# Patient Record
Sex: FEMALE | Race: WHITE | Employment: FULL TIME | URBAN - METROPOLITAN AREA
[De-identification: names, ages, dates, MRNs, and addresses within clinical notes are randomized per-mention and may not be internally consistent; named-entity substitution may affect disease eponyms.]

---
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
## 2018-01-06 DIAGNOSIS — F33.9 RECURRENT DEPRESSION (HCC): ICD-10-CM

## 2018-01-07 RX ORDER — QUETIAPINE FUMARATE 50 MG/1
50 TABLET, FILM COATED ORAL NIGHTLY
Qty: 30 TABLET | Refills: 1 | Status: SHIPPED | OUTPATIENT
Start: 2018-01-07 | End: 2018-01-29 | Stop reason: SDUPTHER

## 2018-01-29 DIAGNOSIS — F33.9 RECURRENT DEPRESSION (HCC): ICD-10-CM

## 2018-01-29 RX ORDER — QUETIAPINE FUMARATE 50 MG/1
50 TABLET, FILM COATED ORAL NIGHTLY
Qty: 30 TABLET | Refills: 3 | Status: SHIPPED | OUTPATIENT
Start: 2018-01-29 | End: 2018-02-02 | Stop reason: SDUPTHER

## 2018-02-02 ENCOUNTER — OFFICE VISIT (OUTPATIENT)
Dept: PRIMARY CARE CLINIC | Age: 18
End: 2018-02-02
Payer: COMMERCIAL

## 2018-02-02 VITALS
DIASTOLIC BLOOD PRESSURE: 72 MMHG | HEART RATE: 76 BPM | BODY MASS INDEX: 29.47 KG/M2 | HEIGHT: 69 IN | RESPIRATION RATE: 14 BRPM | WEIGHT: 199 LBS | SYSTOLIC BLOOD PRESSURE: 110 MMHG | TEMPERATURE: 97.5 F

## 2018-02-02 DIAGNOSIS — R10.84 GENERALIZED ABDOMINAL PAIN: ICD-10-CM

## 2018-02-02 DIAGNOSIS — F42.9 OBSESSIVE-COMPULSIVE DISORDER, UNSPECIFIED TYPE: ICD-10-CM

## 2018-02-02 DIAGNOSIS — F33.9 RECURRENT DEPRESSION (HCC): Primary | ICD-10-CM

## 2018-02-02 DIAGNOSIS — R53.83 FATIGUE, UNSPECIFIED TYPE: ICD-10-CM

## 2018-02-02 DIAGNOSIS — G43.009 MIGRAINE WITHOUT AURA AND WITHOUT STATUS MIGRAINOSUS, NOT INTRACTABLE: ICD-10-CM

## 2018-02-02 PROCEDURE — G8427 DOCREV CUR MEDS BY ELIG CLIN: HCPCS | Performed by: FAMILY MEDICINE

## 2018-02-02 PROCEDURE — 99214 OFFICE O/P EST MOD 30 MIN: CPT | Performed by: FAMILY MEDICINE

## 2018-02-02 PROCEDURE — G8417 CALC BMI ABV UP PARAM F/U: HCPCS | Performed by: FAMILY MEDICINE

## 2018-02-02 PROCEDURE — G8484 FLU IMMUNIZE NO ADMIN: HCPCS | Performed by: FAMILY MEDICINE

## 2018-02-02 PROCEDURE — 1036F TOBACCO NON-USER: CPT | Performed by: FAMILY MEDICINE

## 2018-02-02 RX ORDER — QUETIAPINE FUMARATE 50 MG/1
50 TABLET, FILM COATED ORAL NIGHTLY
Qty: 30 TABLET | Refills: 1 | Status: SHIPPED | OUTPATIENT
Start: 2018-02-02 | End: 2018-04-17 | Stop reason: SDUPTHER

## 2018-02-02 ASSESSMENT — ENCOUNTER SYMPTOMS
CHEST TIGHTNESS: 0
ABDOMINAL PAIN: 1
SHORTNESS OF BREATH: 0
NAUSEA: 1
CHANGE IN BOWEL HABIT: 0

## 2018-02-02 NOTE — PROGRESS NOTES
Subjective:      Patient ID: Terry Hernández is a 21 y.o. female who presents today for:  Chief Complaint   Patient presents with    Depression     Pt currently taking Seraquel. Pt states lately she has been feeling tired with little energy.  Nausea     X years. Pt states she gets nauseous and vomits yellow stomach acid about 3 times a week. Pt states  when she does not vomit she just feels weird. Depression  Patient presents today for depression. Patient is currently taking Seroquel 50 mg. Condition is chronic and stable. Nausea & Vomiting   This is a chronic problem. The current episode started more than 1 year ago. The problem occurs every several days. The problem has been unchanged. Associated symptoms include abdominal pain, headaches and nausea. Pertinent negatives include no change in bowel habit, chest pain, chills or congestion. The symptoms are aggravated by stress. She has tried nothing for the symptoms. The treatment provided no relief. Fu abd pain    Chronic failed treatment    Past Medical History:   Diagnosis Date    Anxiety     Headache(784.0)      No past surgical history on file. Family History   Problem Relation Age of Onset    Cancer Mother     Arthritis Father     High Blood Pressure Father     Heart Disease Father     Heart Disease Maternal Grandmother     Diabetes Paternal Grandfather      Social History     Social History    Marital status: Single     Spouse name: N/A    Number of children: N/A    Years of education: N/A     Occupational History    Not on file. Social History Main Topics    Smoking status: Never Smoker    Smokeless tobacco: Never Used    Alcohol use No    Drug use: No    Sexual activity: Yes     Partners: Male     Other Topics Concern    Not on file     Social History Narrative    ** Merged History Encounter **          Allergies:  Review of patient's allergies indicates no known allergies.     Review of Systems   Constitutional:

## 2018-02-14 ENCOUNTER — TELEPHONE (OUTPATIENT)
Dept: PRIMARY CARE CLINIC | Age: 18
End: 2018-02-14

## 2018-02-14 NOTE — TELEPHONE ENCOUNTER
----- Message from Rhoda Crisostomo sent at 2/13/2018 10:01 AM EST -----  Hello    Pt was ordered CT abd pelvis w/cont by Dr. Debbie Gerber. It's in peer to peer due to no ultrasound. Tracking#[id number]. Pt was scheduled 2/12/18 and was postponed. [facility] [phone]. Thanks.

## 2018-02-15 DIAGNOSIS — R10.84 GENERALIZED ABDOMINAL PAIN: Primary | ICD-10-CM

## 2018-02-18 ENCOUNTER — APPOINTMENT (OUTPATIENT)
Dept: GENERAL RADIOLOGY | Age: 18
End: 2018-02-18
Payer: COMMERCIAL

## 2018-02-18 ENCOUNTER — HOSPITAL ENCOUNTER (EMERGENCY)
Age: 18
Discharge: HOME | End: 2018-02-18
Attending: EMERGENCY MEDICINE
Payer: COMMERCIAL

## 2018-02-18 ENCOUNTER — APPOINTMENT (OUTPATIENT)
Dept: CT IMAGING | Age: 18
End: 2018-02-18
Payer: COMMERCIAL

## 2018-02-18 VITALS
RESPIRATION RATE: 18 BRPM | DIASTOLIC BLOOD PRESSURE: 88 MMHG | SYSTOLIC BLOOD PRESSURE: 122 MMHG | BODY MASS INDEX: 28.79 KG/M2 | TEMPERATURE: 98.4 F | WEIGHT: 190 LBS | HEIGHT: 68 IN | OXYGEN SATURATION: 100 % | HEART RATE: 81 BPM

## 2018-02-18 DIAGNOSIS — R09.1 PLEURISY: Primary | ICD-10-CM

## 2018-02-18 LAB
ANION GAP SERPL CALCULATED.3IONS-SCNC: 13 MEQ/L (ref 7–13)
BASOPHILS ABSOLUTE: 0 K/UL (ref 0–0.2)
BASOPHILS RELATIVE PERCENT: 0.4 %
BUN BLDV-MCNC: 10 MG/DL (ref 6–20)
CALCIUM SERPL-MCNC: 9.5 MG/DL (ref 8.6–10.2)
CHLORIDE BLD-SCNC: 99 MEQ/L (ref 98–107)
CHP ED QC CHECK: YES
CO2: 24 MEQ/L (ref 22–29)
CREAT SERPL-MCNC: 0.5 MG/DL (ref 0.5–0.9)
EKG ATRIAL RATE: 79 BPM
EKG P AXIS: 64 DEGREES
EKG P-R INTERVAL: 160 MS
EKG Q-T INTERVAL: 376 MS
EKG QRS DURATION: 94 MS
EKG QTC CALCULATION (BAZETT): 431 MS
EKG R AXIS: 32 DEGREES
EKG T AXIS: 38 DEGREES
EKG VENTRICULAR RATE: 79 BPM
EOSINOPHILS ABSOLUTE: 0 K/UL (ref 0–0.7)
EOSINOPHILS RELATIVE PERCENT: 0.8 %
GFR AFRICAN AMERICAN: >60
GFR NON-AFRICAN AMERICAN: >60
GLUCOSE BLD-MCNC: 96 MG/DL (ref 74–109)
HCT VFR BLD CALC: 41.6 % (ref 37–47)
HEMOGLOBIN: 13.5 G/DL (ref 12–16)
LYMPHOCYTES ABSOLUTE: 2.1 K/UL (ref 1–4.8)
LYMPHOCYTES RELATIVE PERCENT: 36 %
MCH RBC QN AUTO: 26.7 PG (ref 27–31.3)
MCHC RBC AUTO-ENTMCNC: 32.5 % (ref 33–37)
MCV RBC AUTO: 82.2 FL (ref 82–100)
MONOCYTES ABSOLUTE: 0.6 K/UL (ref 0.2–0.8)
MONOCYTES RELATIVE PERCENT: 9.9 %
NEUTROPHILS ABSOLUTE: 3.1 K/UL (ref 1.4–6.5)
NEUTROPHILS RELATIVE PERCENT: 52.9 %
PDW BLD-RTO: 14.8 % (ref 11.5–14.5)
PLATELET # BLD: 271 K/UL (ref 130–400)
POC CREATININE WHOLE BLOOD: 0.5
POTASSIUM SERPL-SCNC: 4.1 MEQ/L (ref 3.5–5.1)
PREGNANCY TEST URINE, POC: NEGATIVE
RBC # BLD: 5.06 M/UL (ref 4.2–5.4)
SODIUM BLD-SCNC: 136 MEQ/L (ref 132–144)
TOTAL CK: 44 U/L (ref 0–170)
TROPONIN: <0.01 NG/ML (ref 0–0.01)
WBC # BLD: 5.9 K/UL (ref 4.5–11)

## 2018-02-18 PROCEDURE — 99285 EMERGENCY DEPT VISIT HI MDM: CPT

## 2018-02-18 PROCEDURE — 84484 ASSAY OF TROPONIN QUANT: CPT

## 2018-02-18 PROCEDURE — 85025 COMPLETE CBC W/AUTO DIFF WBC: CPT

## 2018-02-18 PROCEDURE — 80048 BASIC METABOLIC PNL TOTAL CA: CPT

## 2018-02-18 PROCEDURE — 71045 X-RAY EXAM CHEST 1 VIEW: CPT

## 2018-02-18 PROCEDURE — 71275 CT ANGIOGRAPHY CHEST: CPT

## 2018-02-18 PROCEDURE — 82550 ASSAY OF CK (CPK): CPT

## 2018-02-18 PROCEDURE — 36415 COLL VENOUS BLD VENIPUNCTURE: CPT

## 2018-02-18 PROCEDURE — 93005 ELECTROCARDIOGRAM TRACING: CPT

## 2018-02-18 PROCEDURE — 6360000002 HC RX W HCPCS: Performed by: EMERGENCY MEDICINE

## 2018-02-18 PROCEDURE — 96374 THER/PROPH/DIAG INJ IV PUSH: CPT

## 2018-02-18 PROCEDURE — 6360000004 HC RX CONTRAST MEDICATION: Performed by: EMERGENCY MEDICINE

## 2018-02-18 RX ORDER — METHYLPREDNISOLONE SODIUM SUCCINATE 125 MG/2ML
125 INJECTION, POWDER, LYOPHILIZED, FOR SOLUTION INTRAMUSCULAR; INTRAVENOUS ONCE
Status: COMPLETED | OUTPATIENT
Start: 2018-02-18 | End: 2018-02-18

## 2018-02-18 RX ORDER — PREDNISONE 10 MG/1
40 TABLET ORAL DAILY
Qty: 20 TABLET | Refills: 0 | Status: SHIPPED | OUTPATIENT
Start: 2018-02-18 | End: 2018-07-01

## 2018-02-18 RX ADMIN — METHYLPREDNISOLONE SODIUM SUCCINATE 125 MG: 125 INJECTION, POWDER, FOR SOLUTION INTRAMUSCULAR; INTRAVENOUS at 20:01

## 2018-02-18 RX ADMIN — IOPAMIDOL 100 ML: 755 INJECTION, SOLUTION INTRAVENOUS at 19:32

## 2018-02-18 ASSESSMENT — ENCOUNTER SYMPTOMS
WHEEZING: 0
RHINORRHEA: 0
EYES NEGATIVE: 1
SHORTNESS OF BREATH: 1
NAUSEA: 0
TROUBLE SWALLOWING: 0
VOMITING: 0
ABDOMINAL PAIN: 0
CHEST TIGHTNESS: 1
ALLERGIC/IMMUNOLOGIC NEGATIVE: 1

## 2018-02-18 ASSESSMENT — PATIENT HEALTH QUESTIONNAIRE - PHQ9: SUM OF ALL RESPONSES TO PHQ QUESTIONS 1-9: 13

## 2018-02-18 NOTE — ED PROVIDER NOTES
and negative. PAST MEDICAL HISTORY     Past Medical History:   Diagnosis Date    Anxiety     Depression     Headache(784.0)          SURGICAL HISTORY     History reviewed. No pertinent surgical history. CURRENT MEDICATIONS       Previous Medications    IBUPROFEN (ADVIL;MOTRIN) 400 MG TABLET    Take 1 tablet by mouth every 8 hours as needed for Pain    QUETIAPINE (SEROQUEL) 50 MG TABLET    Take 1 tablet by mouth nightly       ALLERGIES     Review of patient's allergies indicates no known allergies. FAMILY HISTORY       Family History   Problem Relation Age of Onset    Cancer Mother     Arthritis Father     High Blood Pressure Father     Heart Disease Father     Heart Disease Maternal Grandmother     Diabetes Paternal Grandfather           SOCIAL HISTORY       Social History     Social History    Marital status: Single     Spouse name: N/A    Number of children: N/A    Years of education: N/A     Social History Main Topics    Smoking status: Never Smoker    Smokeless tobacco: Never Used    Alcohol use No    Drug use: No    Sexual activity: Yes     Partners: Male     Other Topics Concern    None     Social History Narrative    ** Merged History Encounter **            SCREENINGS             PHYSICAL EXAM    (up to 7 for level 4, 8 or more for level 5)     ED Triage Vitals [02/18/18 1736]   BP Temp Temp Source Heart Rate Resp SpO2 Height Weight   123/82 98.4 °F (36.9 °C) Oral 80 14 99 % 5' 8\" (1.727 m) 190 lb (86.2 kg)       Physical Exam   Constitutional: She is oriented to person, place, and time. She appears well-developed and well-nourished. HENT:   Head: Normocephalic and atraumatic. Eyes: Conjunctivae and EOM are normal. Pupils are equal, round, and reactive to light. Neck: Trachea normal, normal range of motion and full passive range of motion without pain. Neck supple. Cardiovascular: Normal rate, regular rhythm, normal heart sounds, intact distal pulses and normal pulses.

## 2018-02-19 LAB
GFR AFRICAN AMERICAN: >60
GFR NON-AFRICAN AMERICAN: >60
PERFORMED ON: ABNORMAL
POC CREATININE: 0.5 MG/DL (ref 0.6–1.1)
POC SAMPLE TYPE: ABNORMAL

## 2018-02-19 NOTE — ED NOTES
Pt given discharge instructions, states understanding, pt ambulated to exit for discharge with steady gait     Yolis Mcclellan RN  02/18/18 2032

## 2018-04-17 DIAGNOSIS — F33.9 RECURRENT DEPRESSION (HCC): ICD-10-CM

## 2018-04-17 RX ORDER — QUETIAPINE FUMARATE 50 MG/1
50 TABLET, FILM COATED ORAL NIGHTLY
Qty: 30 TABLET | Refills: 1 | Status: SHIPPED | OUTPATIENT
Start: 2018-04-17 | End: 2018-05-23

## 2018-05-23 ENCOUNTER — OFFICE VISIT (OUTPATIENT)
Dept: PRIMARY CARE CLINIC | Age: 18
End: 2018-05-23
Payer: COMMERCIAL

## 2018-05-23 VITALS
DIASTOLIC BLOOD PRESSURE: 84 MMHG | BODY MASS INDEX: 29.4 KG/M2 | TEMPERATURE: 98.2 F | HEIGHT: 68 IN | SYSTOLIC BLOOD PRESSURE: 126 MMHG | OXYGEN SATURATION: 99 % | WEIGHT: 194 LBS | RESPIRATION RATE: 16 BRPM | HEART RATE: 98 BPM

## 2018-05-23 DIAGNOSIS — R10.821 RIGHT UPPER QUADRANT ABDOMINAL TENDERNESS WITH REBOUND TENDERNESS: ICD-10-CM

## 2018-05-23 DIAGNOSIS — R11.0 NAUSEA: ICD-10-CM

## 2018-05-23 DIAGNOSIS — R53.83 FATIGUE, UNSPECIFIED TYPE: ICD-10-CM

## 2018-05-23 DIAGNOSIS — E78.5 HYPERLIPIDEMIA, UNSPECIFIED HYPERLIPIDEMIA TYPE: ICD-10-CM

## 2018-05-23 DIAGNOSIS — R10.84 GENERALIZED ABDOMINAL PAIN: ICD-10-CM

## 2018-05-23 DIAGNOSIS — R19.7 DIARRHEA, UNSPECIFIED TYPE: ICD-10-CM

## 2018-05-23 DIAGNOSIS — E55.9 VITAMIN D DEFICIENCY: ICD-10-CM

## 2018-05-23 DIAGNOSIS — F33.9 RECURRENT DEPRESSION (HCC): ICD-10-CM

## 2018-05-23 DIAGNOSIS — N92.6 MISSED MENSES: Primary | ICD-10-CM

## 2018-05-23 LAB
BILIRUBIN, POC: NORMAL
BLOOD URINE, POC: NORMAL
CLARITY, POC: CLEAR
COLOR, POC: YELLOW
CONTROL: NORMAL
GLUCOSE URINE, POC: NORMAL
KETONES, POC: NORMAL
LEUKOCYTE EST, POC: NORMAL
NITRITE, POC: NORMAL
PH, POC: 6
PREGNANCY TEST URINE, POC: NORMAL
PROTEIN, POC: NORMAL
SPECIFIC GRAVITY, POC: 1.03
UROBILINOGEN, POC: NORMAL

## 2018-05-23 PROCEDURE — G8417 CALC BMI ABV UP PARAM F/U: HCPCS | Performed by: FAMILY MEDICINE

## 2018-05-23 PROCEDURE — 1036F TOBACCO NON-USER: CPT | Performed by: FAMILY MEDICINE

## 2018-05-23 PROCEDURE — 81025 URINE PREGNANCY TEST: CPT | Performed by: FAMILY MEDICINE

## 2018-05-23 PROCEDURE — G8427 DOCREV CUR MEDS BY ELIG CLIN: HCPCS | Performed by: FAMILY MEDICINE

## 2018-05-23 PROCEDURE — 81003 URINALYSIS AUTO W/O SCOPE: CPT | Performed by: FAMILY MEDICINE

## 2018-05-23 PROCEDURE — 99214 OFFICE O/P EST MOD 30 MIN: CPT | Performed by: FAMILY MEDICINE

## 2018-05-23 RX ORDER — QUETIAPINE FUMARATE 100 MG/1
100 TABLET, FILM COATED ORAL EVERY EVENING
Qty: 30 TABLET | Refills: 2 | Status: SHIPPED | OUTPATIENT
Start: 2018-05-23 | End: 2018-06-28

## 2018-05-23 RX ORDER — CIPROFLOXACIN 500 MG/1
500 TABLET, FILM COATED ORAL 2 TIMES DAILY
Qty: 20 TABLET | Refills: 0 | Status: SHIPPED | OUTPATIENT
Start: 2018-05-23 | End: 2018-06-28

## 2018-05-23 ASSESSMENT — ENCOUNTER SYMPTOMS
NAUSEA: 1
SHORTNESS OF BREATH: 0
WHEEZING: 0
COUGH: 0
ABDOMINAL PAIN: 1
DIARRHEA: 1
VOMITING: 1

## 2018-05-23 ASSESSMENT — PATIENT HEALTH QUESTIONNAIRE - PHQ9
1. LITTLE INTEREST OR PLEASURE IN DOING THINGS: 1
2. FEELING DOWN, DEPRESSED OR HOPELESS: 1
SUM OF ALL RESPONSES TO PHQ QUESTIONS 1-9: 2
SUM OF ALL RESPONSES TO PHQ9 QUESTIONS 1 & 2: 2

## 2018-05-24 DIAGNOSIS — R53.83 FATIGUE, UNSPECIFIED TYPE: ICD-10-CM

## 2018-05-24 DIAGNOSIS — E55.9 VITAMIN D DEFICIENCY: ICD-10-CM

## 2018-05-24 DIAGNOSIS — E78.5 HYPERLIPIDEMIA, UNSPECIFIED HYPERLIPIDEMIA TYPE: ICD-10-CM

## 2018-05-24 LAB
ALBUMIN SERPL-MCNC: 4.7 G/DL (ref 3.9–4.9)
ALP BLD-CCNC: 52 U/L (ref 40–130)
ALT SERPL-CCNC: 11 U/L (ref 0–33)
ANION GAP SERPL CALCULATED.3IONS-SCNC: 15 MEQ/L (ref 7–13)
AST SERPL-CCNC: 12 U/L (ref 0–35)
BASOPHILS ABSOLUTE: 0 K/UL (ref 0–0.2)
BASOPHILS RELATIVE PERCENT: 0.3 %
BILIRUB SERPL-MCNC: 0.5 MG/DL (ref 0–1.2)
BUN BLDV-MCNC: 11 MG/DL (ref 6–20)
CALCIUM SERPL-MCNC: 9.3 MG/DL (ref 8.6–10.2)
CHLORIDE BLD-SCNC: 100 MEQ/L (ref 98–107)
CHOLESTEROL, TOTAL: 172 MG/DL (ref 0–199)
CO2: 25 MEQ/L (ref 22–29)
CREAT SERPL-MCNC: 0.54 MG/DL (ref 0.5–0.9)
EOSINOPHILS ABSOLUTE: 0 K/UL (ref 0–0.7)
EOSINOPHILS RELATIVE PERCENT: 0.4 %
GFR AFRICAN AMERICAN: >60
GFR NON-AFRICAN AMERICAN: >60
GLOBULIN: 2.3 G/DL (ref 2.3–3.5)
GLUCOSE BLD-MCNC: 77 MG/DL (ref 74–109)
HCT VFR BLD CALC: 39.7 % (ref 37–47)
HDLC SERPL-MCNC: 53 MG/DL (ref 40–59)
HEMOGLOBIN: 13.2 G/DL (ref 12–16)
LDL CHOLESTEROL CALCULATED: 108 MG/DL (ref 0–129)
LYMPHOCYTES ABSOLUTE: 1.5 K/UL (ref 1–4.8)
LYMPHOCYTES RELATIVE PERCENT: 20.2 %
MCH RBC QN AUTO: 27.6 PG (ref 27–31.3)
MCHC RBC AUTO-ENTMCNC: 33.1 % (ref 33–37)
MCV RBC AUTO: 83.4 FL (ref 82–100)
MONOCYTES ABSOLUTE: 0.5 K/UL (ref 0.2–0.8)
MONOCYTES RELATIVE PERCENT: 6.4 %
NEUTROPHILS ABSOLUTE: 5.4 K/UL (ref 1.4–6.5)
NEUTROPHILS RELATIVE PERCENT: 72.7 %
PDW BLD-RTO: 14.7 % (ref 11.5–14.5)
PLATELET # BLD: 310 K/UL (ref 130–400)
POTASSIUM SERPL-SCNC: 4.3 MEQ/L (ref 3.5–5.1)
RBC # BLD: 4.76 M/UL (ref 4.2–5.4)
SODIUM BLD-SCNC: 140 MEQ/L (ref 132–144)
T4 TOTAL: 6.6 UG/DL (ref 4.5–11.7)
TOTAL PROTEIN: 7 G/DL (ref 6.4–8.1)
TRIGL SERPL-MCNC: 55 MG/DL (ref 0–200)
TSH SERPL DL<=0.05 MIU/L-ACNC: 2.46 UIU/ML (ref 0.27–4.2)
VITAMIN D 25-HYDROXY: 17.7 NG/ML (ref 30–100)
WBC # BLD: 7.4 K/UL (ref 4.5–11)

## 2018-05-26 LAB — T3 UPTAKE: 32 % (ref 28–41)

## 2018-06-08 ENCOUNTER — HOSPITAL ENCOUNTER (OUTPATIENT)
Dept: ULTRASOUND IMAGING | Age: 18
Discharge: HOME | End: 2018-06-10
Payer: COMMERCIAL

## 2018-06-08 DIAGNOSIS — R10.821 RIGHT UPPER QUADRANT ABDOMINAL TENDERNESS WITH REBOUND TENDERNESS: ICD-10-CM

## 2018-06-08 PROCEDURE — 76705 ECHO EXAM OF ABDOMEN: CPT

## 2018-06-15 ENCOUNTER — HOSPITAL ENCOUNTER (OUTPATIENT)
Dept: CT IMAGING | Age: 18
Discharge: HOME | End: 2018-06-17
Payer: COMMERCIAL

## 2018-06-15 VITALS
WEIGHT: 190 LBS | RESPIRATION RATE: 16 BRPM | HEIGHT: 67 IN | DIASTOLIC BLOOD PRESSURE: 85 MMHG | BODY MASS INDEX: 29.82 KG/M2 | HEART RATE: 97 BPM | SYSTOLIC BLOOD PRESSURE: 130 MMHG

## 2018-06-15 DIAGNOSIS — R11.0 NAUSEA: ICD-10-CM

## 2018-06-15 DIAGNOSIS — R10.821 RIGHT UPPER QUADRANT ABDOMINAL TENDERNESS WITH REBOUND TENDERNESS: ICD-10-CM

## 2018-06-15 DIAGNOSIS — R10.84 GENERALIZED ABDOMINAL PAIN: ICD-10-CM

## 2018-06-15 PROCEDURE — 6360000004 HC RX CONTRAST MEDICATION: Performed by: FAMILY MEDICINE

## 2018-06-15 PROCEDURE — 2580000003 HC RX 258: Performed by: FAMILY MEDICINE

## 2018-06-15 PROCEDURE — 2500000003 HC RX 250 WO HCPCS: Performed by: FAMILY MEDICINE

## 2018-06-15 PROCEDURE — 74177 CT ABD & PELVIS W/CONTRAST: CPT

## 2018-06-15 RX ORDER — SODIUM CHLORIDE 0.9 % (FLUSH) 0.9 %
10 SYRINGE (ML) INJECTION 2 TIMES DAILY
Status: DISCONTINUED | OUTPATIENT
Start: 2018-06-15 | End: 2018-06-18 | Stop reason: HOSPADM

## 2018-06-15 RX ADMIN — IOPAMIDOL 100 ML: 755 INJECTION, SOLUTION INTRAVENOUS at 10:45

## 2018-06-15 RX ADMIN — BARIUM SULFATE 450 ML: 20 SUSPENSION ORAL at 10:47

## 2018-06-15 RX ADMIN — Medication 10 ML: at 10:46

## 2018-06-28 ENCOUNTER — OFFICE VISIT (OUTPATIENT)
Dept: PRIMARY CARE CLINIC | Age: 18
End: 2018-06-28
Payer: COMMERCIAL

## 2018-06-28 VITALS
WEIGHT: 192.9 LBS | TEMPERATURE: 97.6 F | SYSTOLIC BLOOD PRESSURE: 124 MMHG | DIASTOLIC BLOOD PRESSURE: 80 MMHG | HEIGHT: 68 IN | HEART RATE: 96 BPM | OXYGEN SATURATION: 97 % | BODY MASS INDEX: 29.24 KG/M2

## 2018-06-28 DIAGNOSIS — R30.0 DYSURIA: ICD-10-CM

## 2018-06-28 DIAGNOSIS — N93.8 DUB (DYSFUNCTIONAL UTERINE BLEEDING): ICD-10-CM

## 2018-06-28 DIAGNOSIS — J32.9 SINUSITIS, UNSPECIFIED CHRONICITY, UNSPECIFIED LOCATION: ICD-10-CM

## 2018-06-28 DIAGNOSIS — R35.0 FREQUENCY OF URINATION: ICD-10-CM

## 2018-06-28 DIAGNOSIS — K58.2 IRRITABLE BOWEL SYNDROME WITH BOTH CONSTIPATION AND DIARRHEA: ICD-10-CM

## 2018-06-28 DIAGNOSIS — R10.84 GENERALIZED ABDOMINAL PAIN: ICD-10-CM

## 2018-06-28 DIAGNOSIS — E55.9 VITAMIN D DEFICIENCY: ICD-10-CM

## 2018-06-28 DIAGNOSIS — N76.0 BACTERIAL VAGINOSIS: ICD-10-CM

## 2018-06-28 DIAGNOSIS — B96.89 BACTERIAL VAGINOSIS: ICD-10-CM

## 2018-06-28 DIAGNOSIS — F33.9 RECURRENT DEPRESSION (HCC): Primary | ICD-10-CM

## 2018-06-28 LAB
ALBUMIN SERPL-MCNC: 4.8 G/DL (ref 3.9–4.9)
ALP BLD-CCNC: 52 U/L (ref 40–130)
ALT SERPL-CCNC: 11 U/L (ref 0–33)
ANION GAP SERPL CALCULATED.3IONS-SCNC: 17 MEQ/L (ref 7–13)
AST SERPL-CCNC: 12 U/L (ref 0–35)
BASOPHILS ABSOLUTE: 0 K/UL (ref 0–0.2)
BASOPHILS RELATIVE PERCENT: 0.3 %
BILIRUB SERPL-MCNC: 0.5 MG/DL (ref 0–1.2)
BILIRUBIN, POC: NORMAL
BLOOD URINE, POC: NORMAL
BUN BLDV-MCNC: 10 MG/DL (ref 6–20)
CALCIUM SERPL-MCNC: 9.6 MG/DL (ref 8.6–10.2)
CHLORIDE BLD-SCNC: 99 MEQ/L (ref 98–107)
CLARITY, POC: NORMAL
CO2: 23 MEQ/L (ref 22–29)
COLOR, POC: NORMAL
CONTROL: POSITIVE
CREAT SERPL-MCNC: 0.5 MG/DL (ref 0.5–0.9)
EOSINOPHILS ABSOLUTE: 0 K/UL (ref 0–0.7)
EOSINOPHILS RELATIVE PERCENT: 0.4 %
GFR AFRICAN AMERICAN: >60
GFR NON-AFRICAN AMERICAN: >60
GLOBULIN: 2.6 G/DL (ref 2.3–3.5)
GLUCOSE BLD-MCNC: 87 MG/DL (ref 74–109)
GLUCOSE URINE, POC: NORMAL
GONADOTROPIN, CHORIONIC (HCG) QUANT: 667.8 MIU/ML
HCT VFR BLD CALC: 39.3 % (ref 37–47)
HEMOGLOBIN: 13.3 G/DL (ref 12–16)
KETONES, POC: NORMAL
LEUKOCYTE EST, POC: 15
LYMPHOCYTES ABSOLUTE: 1.4 K/UL (ref 1–4.8)
LYMPHOCYTES RELATIVE PERCENT: 20.6 %
MCH RBC QN AUTO: 28 PG (ref 27–31.3)
MCHC RBC AUTO-ENTMCNC: 33.8 % (ref 33–37)
MCV RBC AUTO: 82.9 FL (ref 82–100)
MONOCYTES ABSOLUTE: 0.4 K/UL (ref 0.2–0.8)
MONOCYTES RELATIVE PERCENT: 6.5 %
NEUTROPHILS ABSOLUTE: 5 K/UL (ref 1.4–6.5)
NEUTROPHILS RELATIVE PERCENT: 72.2 %
NITRITE, POC: NORMAL
PDW BLD-RTO: 14.7 % (ref 11.5–14.5)
PH, POC: 7.5
PLATELET # BLD: 313 K/UL (ref 130–400)
POTASSIUM SERPL-SCNC: 4.1 MEQ/L (ref 3.5–5.1)
PREGNANCY TEST URINE, POC: POSITIVE
PROTEIN, POC: 0.15
RBC # BLD: 4.74 M/UL (ref 4.2–5.4)
SODIUM BLD-SCNC: 139 MEQ/L (ref 132–144)
SPECIFIC GRAVITY, POC: 1.01
TOTAL PROTEIN: 7.4 G/DL (ref 6.4–8.1)
UROBILINOGEN, POC: 3.5
WBC # BLD: 6.9 K/UL (ref 4.5–11)

## 2018-06-28 PROCEDURE — G8427 DOCREV CUR MEDS BY ELIG CLIN: HCPCS | Performed by: FAMILY MEDICINE

## 2018-06-28 PROCEDURE — 81003 URINALYSIS AUTO W/O SCOPE: CPT | Performed by: FAMILY MEDICINE

## 2018-06-28 PROCEDURE — 81025 URINE PREGNANCY TEST: CPT | Performed by: FAMILY MEDICINE

## 2018-06-28 PROCEDURE — G8417 CALC BMI ABV UP PARAM F/U: HCPCS | Performed by: FAMILY MEDICINE

## 2018-06-28 PROCEDURE — 99214 OFFICE O/P EST MOD 30 MIN: CPT | Performed by: FAMILY MEDICINE

## 2018-06-28 PROCEDURE — 1036F TOBACCO NON-USER: CPT | Performed by: FAMILY MEDICINE

## 2018-06-28 RX ORDER — FLUOXETINE HYDROCHLORIDE 20 MG/1
20 CAPSULE ORAL DAILY
Qty: 30 CAPSULE | Refills: 3 | Status: SHIPPED | OUTPATIENT
Start: 2018-06-28 | End: 2018-08-07 | Stop reason: ALTCHOICE

## 2018-06-28 RX ORDER — METRONIDAZOLE 7.5 MG/G
GEL VAGINAL
Qty: 1 TUBE | Refills: 2 | Status: SHIPPED | OUTPATIENT
Start: 2018-06-28 | End: 2018-06-28

## 2018-06-28 RX ORDER — AZITHROMYCIN 250 MG/1
TABLET, FILM COATED ORAL
Qty: 6 TABLET | Refills: 0 | Status: SHIPPED | OUTPATIENT
Start: 2018-06-28 | End: 2018-08-07 | Stop reason: ALTCHOICE

## 2018-06-28 RX ORDER — CHOLECALCIFEROL (VITAMIN D3) 50 MCG
2000 TABLET ORAL DAILY
Qty: 90 TABLET | Refills: 1 | Status: SHIPPED | OUTPATIENT
Start: 2018-06-28 | End: 2018-08-07

## 2018-06-28 ASSESSMENT — ENCOUNTER SYMPTOMS
SHORTNESS OF BREATH: 0
NAUSEA: 1
DIARRHEA: 1
ABDOMINAL PAIN: 1
BLOOD IN STOOL: 0
COUGH: 1
CONSTIPATION: 1
RECTAL PAIN: 0
VOMITING: 1

## 2018-06-28 NOTE — PROGRESS NOTES
Drug use: No    Sexual activity: Yes     Partners: Male     Other Topics Concern    Not on file     Social History Narrative    ** Merged History Encounter **          Allergies:  Patient has no known allergies. Review of Systems   Constitutional: Negative for chills, fever and weight loss. Respiratory: Positive for cough. Negative for shortness of breath. Cardiovascular: Negative for chest pain. Gastrointestinal: Positive for abdominal pain, constipation, diarrhea, nausea and vomiting. Negative for blood in stool, melena and rectal pain. Genitourinary: Positive for dysuria, vaginal discharge and vaginal pain. Musculoskeletal: Negative for myalgias. Neurological: Negative for dizziness and headaches. Objective:   /80 (Site: Right Arm, Position: Sitting, Cuff Size: Medium Adult)   Pulse 96   Temp 97.6 °F (36.4 °C) (Tympanic)   Ht 5' 8\" (1.727 m)   Wt 192 lb 14.4 oz (87.5 kg)   SpO2 97%   BMI 29.33 kg/m²     Physical Exam      PHYSICAL EXAMINATION:   VITAL SIGNS: are as recorded. GENERAL:  The patient appears well nourished and well-developed, and with normal affect. No acute respiratory distress. Alert and oriented times 3. No skin rashes. HEENT:  TMs normal bilaterally. Canals and ears normal. Pharynx is clear. Extraocular eye motions intact and pain free. Pupils reactive and equally round. Sclerae and conjunctivae clear, normocephalic and atraumatic. RESPIRATORY:  Clear and equal breath sounds with no acute respiratory distress. HEART: Regular rhythm without murmur, rub or gallop. ABDOMEN:  soft, nontender. No masses, guarding or rebound. Normoactive bowel sounds. NECK: No masses or adenopathy palpable. No bruits heard. No asymmetry visible. LOW BACK: No tenderness to palpation of inferior lumbar spine or either sacroiliac joint area. Assessment:      Diagnosis Orders   1. Recurrent depression ([address]     2.  Frequency of urination  POCT Urinalysis No Micro (Auto)   3. Generalized abdominal pain  POCT urine pregnancy   4. Sinusitis, unspecified chronicity, unspecified location  azithromycin (ZITHROMAX Z-EVI) 250 MG tablet   5. Irritable bowel syndrome with both constipation and diarrhea  CBC Auto Differential    Comprehensive Metabolic Panel    DISCONTINUED: linaclotide (LINZESS) 145 MCG capsule   6. Vitamin D deficiency  Cholecalciferol (VITAMIN D) 2000 units TABS tablet   7. Bacterial vaginosis  DISCONTINUED: metroNIDAZOLE (METROGEL VAGINAL) 0.75 % vaginal gel   8.  Dysuria  C.trachomatis N.gonorrhoeae DNA, Urine   9. DUB (dysfunctional uterine bleeding)  CBC Auto Differential    HCG, Quantitative, Pregnancy       Plan:      Orders Placed This Encounter   Procedures    C.trachomatis N.gonorrhoeae DNA, Urine     Standing Status:   Future     Number of Occurrences:   1     Standing Expiration Date:   6/28/2019    CBC Auto Differential     Standing Status:   Future     Number of Occurrences:   1     Standing Expiration Date:   6/28/2019    Comprehensive Metabolic Panel     Standing Status:   Future     Number of Occurrences:   1     Standing Expiration Date:   6/28/2019    HCG, Quantitative, Pregnancy     Standing Status:   Future     Number of Occurrences:   1     Standing Expiration Date:   6/28/2019    POCT Urinalysis No Micro (Auto)    POCT urine pregnancy     Orders Placed This Encounter   Medications    azithromycin (ZITHROMAX Z-EVI) 250 MG tablet     Sig: Take 2 pills today then one daily til finished     Dispense:  6 tablet     Refill:  0    Cholecalciferol (VITAMIN D) 2000 units TABS tablet     Sig: Take 1 tablet by mouth daily     Dispense:  90 tablet     Refill:  1    DISCONTD: linaclotide (LINZESS) 145 MCG capsule     Sig: Take 1 capsule by mouth every morning (before breakfast)     Dispense:  16 capsule     Refill:  0     [id number]  Exp-04/2019    DISCONTD: metroNIDAZOLE (METROGEL VAGINAL) 0.75 % vaginal gel     Sig: Place

## 2018-06-30 ENCOUNTER — OFFICE VISIT (OUTPATIENT)
Dept: PRIMARY CARE CLINIC | Age: 18
End: 2018-06-30
Payer: COMMERCIAL

## 2018-06-30 VITALS
DIASTOLIC BLOOD PRESSURE: 88 MMHG | WEIGHT: 192 LBS | OXYGEN SATURATION: 99 % | BODY MASS INDEX: 29.1 KG/M2 | RESPIRATION RATE: 16 BRPM | HEIGHT: 68 IN | TEMPERATURE: 97.4 F | HEART RATE: 74 BPM | SYSTOLIC BLOOD PRESSURE: 118 MMHG

## 2018-06-30 DIAGNOSIS — K58.9 IRRITABLE BOWEL SYNDROME, UNSPECIFIED TYPE: ICD-10-CM

## 2018-06-30 DIAGNOSIS — G43.009 MIGRAINE WITHOUT AURA AND WITHOUT STATUS MIGRAINOSUS, NOT INTRACTABLE: ICD-10-CM

## 2018-06-30 DIAGNOSIS — Z3A.01 LESS THAN 8 WEEKS GESTATION OF PREGNANCY: Primary | ICD-10-CM

## 2018-06-30 DIAGNOSIS — F33.9 RECURRENT DEPRESSION (HCC): ICD-10-CM

## 2018-06-30 PROCEDURE — G8427 DOCREV CUR MEDS BY ELIG CLIN: HCPCS | Performed by: FAMILY MEDICINE

## 2018-06-30 PROCEDURE — 1036F TOBACCO NON-USER: CPT | Performed by: FAMILY MEDICINE

## 2018-06-30 PROCEDURE — G8417 CALC BMI ABV UP PARAM F/U: HCPCS | Performed by: FAMILY MEDICINE

## 2018-06-30 PROCEDURE — 99214 OFFICE O/P EST MOD 30 MIN: CPT | Performed by: FAMILY MEDICINE

## 2018-06-30 RX ORDER — PNV NO.95/FERROUS FUM/FOLIC AC 28MG-0.8MG
TABLET ORAL
Qty: 90 TABLET | Refills: 3 | Status: SHIPPED | OUTPATIENT
Start: 2018-06-30 | End: 2018-07-02 | Stop reason: SDUPTHER

## 2018-06-30 NOTE — PROGRESS NOTES
Libido seems to be normal.  She reports no musculoskeletal aches or pains. No paresthesias or loss of strength. Vitals:    06/30/18 1122   BP: 118/88   Site: Left Arm   Position: Sitting   Cuff Size: Medium Adult   Pulse: 74   Resp: 16   Temp: 97.4 °F (36.3 °C)   TempSrc: Tympanic   SpO2: 99%   Weight: 192 lb (87.1 kg)   Height: 5' 8\" (1.727 m)       Physical Exam       PHYSICAL EXAMINATION:   VITAL SIGNS: are as recorded. GENERAL:  The patient appears well nourished and well-developed, and with normal affect. No acute respiratory distress. Alert and oriented times 3. No skin rashes. HEENT:  TMs normal bilaterally. Canals and ears normal. Pharynx is clear. Extraocular eye motions intact and pain free. Pupils reactive and equally round. Sclerae and conjunctivae clear, normocephalic and atraumatic. RESPIRATORY:  Clear and equal breath sounds with no acute respiratory distress. HEART: Regular rhythm without murmur, rub or gallop. ABDOMEN:  soft, nontender. No masses, guarding or rebound. Normoactive bowel sounds. LOW BACK: No tenderness to palpation of inferior lumbar spine or either sacroiliac joint area. Assessment/Plan  Wilmer Meredith was seen today for cough. Diagnoses and all orders for this visit:    Less than 8 weeks gestation of pregnancy  -     Ambulatory referral to Obstetrics / Gynecology    Recurrent depression ([facility] [address]    Migraine without aura and without status migrainosus, not intractable    Irritable bowel syndrome, unspecified type    Other orders  -     Discontinue: Prenatal Vit-Fe Fumarate-FA (PRENATAL VITAMIN) 27-0.8 MG TABS; qd        see ob soon    Health Maintenance Due   Topic Date Due    HIV screen  05/28/2013       Controlled Substances Monitoring:      Return in about 4 weeks (around 7/28/2018).     Lazaro Hernandez MD

## 2018-07-02 ENCOUNTER — TELEPHONE (OUTPATIENT)
Dept: PRIMARY CARE CLINIC | Age: 18
End: 2018-07-02

## 2018-07-02 ENCOUNTER — TELEPHONE (OUTPATIENT)
Dept: OBGYN CLINIC | Age: 18
End: 2018-07-02

## 2018-07-02 ENCOUNTER — PATIENT MESSAGE (OUTPATIENT)
Dept: PRIMARY CARE CLINIC | Age: 18
End: 2018-07-02

## 2018-07-02 DIAGNOSIS — N91.2 AMENORRHEA: Primary | ICD-10-CM

## 2018-07-02 RX ORDER — PNV NO.95/FERROUS FUM/FOLIC AC 28MG-0.8MG
TABLET ORAL
Qty: 90 TABLET | Refills: 3 | Status: SHIPPED | OUTPATIENT
Start: 2018-07-02 | End: 2018-09-25

## 2018-07-03 ENCOUNTER — TELEPHONE (OUTPATIENT)
Dept: PRIMARY CARE CLINIC | Age: 18
End: 2018-07-03

## 2018-07-03 LAB
C. TRACHOMATIS DNA ,URINE: NEGATIVE
N. GONORRHOEAE DNA, URINE: NEGATIVE

## 2018-07-03 NOTE — TELEPHONE ENCOUNTER
Ok to give pt letter. Please also give her the common mediation list as well ( list of rx she may take while pregnant) all us orders placed.

## 2018-07-03 NOTE — TELEPHONE ENCOUNTER
The prenatal vitamin you prescribed is not available any longer. She will need another one prescribed.

## 2018-07-12 ENCOUNTER — TELEPHONE (OUTPATIENT)
Dept: PRIMARY CARE CLINIC | Age: 18
End: 2018-07-12

## 2018-07-12 RX ORDER — AMOXICILLIN 500 MG/1
TABLET, FILM COATED ORAL
Qty: 30 TABLET | Refills: 0 | Status: SHIPPED | OUTPATIENT
Start: 2018-07-12 | End: 2018-08-07 | Stop reason: ALTCHOICE

## 2018-07-13 ENCOUNTER — HOSPITAL ENCOUNTER (EMERGENCY)
Age: 18
Discharge: HOME | End: 2018-07-13
Payer: COMMERCIAL

## 2018-07-13 VITALS
RESPIRATION RATE: 18 BRPM | WEIGHT: 200 LBS | HEIGHT: 68 IN | OXYGEN SATURATION: 99 % | BODY MASS INDEX: 30.31 KG/M2 | SYSTOLIC BLOOD PRESSURE: 126 MMHG | DIASTOLIC BLOOD PRESSURE: 78 MMHG | HEART RATE: 91 BPM | TEMPERATURE: 98.1 F
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
## 2018-07-13 DIAGNOSIS — S39.012A STRAIN OF LUMBAR REGION, INITIAL ENCOUNTER: ICD-10-CM

## 2018-07-13 PROCEDURE — 99283 EMERGENCY DEPT VISIT LOW MDM: CPT

## 2018-07-13 PROCEDURE — 6370000000 HC RX 637 (ALT 250 FOR IP): Performed by: NURSE PRACTITIONER

## 2018-07-13 RX ORDER — ACETAMINOPHEN 325 MG/1
650 TABLET ORAL ONCE
Status: COMPLETED | OUTPATIENT
Start: 2018-07-13 | End: 2018-07-13

## 2018-07-13 RX ADMIN — ACETAMINOPHEN 650 MG: 325 TABLET ORAL at 13:27

## 2018-07-13 ASSESSMENT — PAIN DESCRIPTION - ORIENTATION: ORIENTATION: LOWER

## 2018-07-13 ASSESSMENT — ENCOUNTER SYMPTOMS
ABDOMINAL PAIN: 0
SHORTNESS OF BREATH: 0
COUGH: 0
BACK PAIN: 1

## 2018-07-13 ASSESSMENT — PAIN DESCRIPTION - LOCATION: LOCATION: BACK

## 2018-07-13 ASSESSMENT — PAIN SCALES - GENERAL
PAINLEVEL_OUTOF10: 4
PAINLEVEL_OUTOF10: 4
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
## 2018-07-13 NOTE — ED PROVIDER NOTES
Father     High Blood Pressure Father     Heart Disease Father     Heart Disease Maternal Grandmother     Diabetes Paternal Grandfather           SOCIAL HISTORY       Social History     Social History    Marital status: Single     Spouse name: N/A    Number of children: N/A    Years of education: N/A     Social History Main Topics    Smoking status: Never Smoker    Smokeless tobacco: Never Used    Alcohol use No    Drug use: No    Sexual activity: Yes     Partners: Male     Other Topics Concern    None     Social History Narrative    ** Merged History Encounter **            SCREENINGS             PHYSICAL EXAM    (up to 7 for level 4, 8 or more for level 5)     ED Triage Vitals   BP Temp Temp src Pulse Resp SpO2 Height Weight   -- -- -- -- -- -- -- --       Physical Exam   Constitutional: She is oriented to person, place, and time. She appears well-developed and well-nourished. HENT:   Head: Normocephalic and atraumatic. Right Ear: External ear normal.   Left Ear: External ear normal.   Mouth/Throat: Oropharynx is clear and moist.   Eyes: Conjunctivae and EOM are normal. Pupils are equal, round, and reactive to light. Neck: Normal range of motion. Neck supple. Cardiovascular: Normal rate and regular rhythm. Pulmonary/Chest: Effort normal and breath sounds normal.   Abdominal: Soft. Bowel sounds are normal. She exhibits no distension. There is no tenderness. Musculoskeletal: Normal range of motion. Lumbar back: She exhibits tenderness and pain. She exhibits normal range of motion, no bony tenderness, no swelling, no edema, no deformity, no laceration, no spasm and normal pulse. Back:    Neurological: She is alert and oriented to person, place, and time. She has normal reflexes. Skin: Skin is warm and dry. Psychiatric: Judgment normal.   Nursing note and vitals reviewed. All other labs were within normal range or not returned as of this dictation.     EMERGENCY

## 2018-07-18 ENCOUNTER — HOSPITAL ENCOUNTER (OUTPATIENT)
Dept: ULTRASOUND IMAGING | Age: 18
Discharge: HOME | End: 2018-07-20
Payer: COMMERCIAL

## 2018-07-18 DIAGNOSIS — N91.2 AMENORRHEA: ICD-10-CM

## 2018-07-23 ENCOUNTER — HOSPITAL ENCOUNTER (OUTPATIENT)
Dept: ULTRASOUND IMAGING | Age: 18
Discharge: HOME | End: 2018-07-25
Payer: COMMERCIAL

## 2018-07-23 PROCEDURE — 76801 OB US < 14 WKS SINGLE FETUS: CPT

## 2018-07-23 PROCEDURE — 76830 TRANSVAGINAL US NON-OB: CPT

## 2018-08-07 ENCOUNTER — OFFICE VISIT (OUTPATIENT)
Dept: OBGYN CLINIC | Age: 18
End: 2018-08-07
Payer: COMMERCIAL

## 2018-08-07 VITALS
HEIGHT: 68 IN | WEIGHT: 191 LBS | DIASTOLIC BLOOD PRESSURE: 80 MMHG | BODY MASS INDEX: 28.95 KG/M2 | SYSTOLIC BLOOD PRESSURE: 122 MMHG

## 2018-08-07 DIAGNOSIS — N91.2 AMENORRHEA: Primary | ICD-10-CM

## 2018-08-07 DIAGNOSIS — N91.2 AMENORRHEA: ICD-10-CM

## 2018-08-07 DIAGNOSIS — Z3A.10 10 WEEKS GESTATION OF PREGNANCY: ICD-10-CM

## 2018-08-07 LAB
ABO/RH: NORMAL
ANTIBODY SCREEN: NORMAL
BACTERIA: NORMAL /HPF
BASOPHILS ABSOLUTE: 0 K/UL (ref 0–0.2)
BASOPHILS RELATIVE PERCENT: 0.4 %
BILIRUBIN URINE: ABNORMAL
BLOOD, URINE: NEGATIVE
CLARITY: ABNORMAL
COLOR: ABNORMAL
CRYSTALS, UA: NORMAL
EOSINOPHILS ABSOLUTE: 0.1 K/UL (ref 0–0.7)
EOSINOPHILS RELATIVE PERCENT: 1 %
EPITHELIAL CELLS, UA: NORMAL /HPF
GLUCOSE URINE: NEGATIVE MG/DL
HCT VFR BLD CALC: 35.7 % (ref 37–47)
HEMOGLOBIN: 12.3 G/DL (ref 12–16)
HEPATITIS B SURFACE ANTIGEN INTERPRETATION: NORMAL
KETONES, URINE: ABNORMAL MG/DL
LEUKOCYTE ESTERASE, URINE: ABNORMAL
LYMPHOCYTES ABSOLUTE: 1.1 K/UL (ref 1–4.8)
LYMPHOCYTES RELATIVE PERCENT: 20.1 %
MCH RBC QN AUTO: 28.6 PG (ref 27–31.3)
MCHC RBC AUTO-ENTMCNC: 34.6 % (ref 33–37)
MCV RBC AUTO: 82.7 FL (ref 82–100)
MONOCYTES ABSOLUTE: 0.8 K/UL (ref 0.2–0.8)
MONOCYTES RELATIVE PERCENT: 13.8 %
NEUTROPHILS ABSOLUTE: 3.5 K/UL (ref 1.4–6.5)
NEUTROPHILS RELATIVE PERCENT: 64.7 %
NITRITE, URINE: NEGATIVE
PDW BLD-RTO: 14.1 % (ref 11.5–14.5)
PH UA: 5.5 (ref 5–9)
PLATELET # BLD: 259 K/UL (ref 130–400)
PROTEIN UA: ABNORMAL MG/DL
RBC # BLD: 4.31 M/UL (ref 4.2–5.4)
RBC UA: NORMAL /HPF (ref 0–2)
RUBELLA ANTIBODY IGG: 38.4 IU/ML
SPECIFIC GRAVITY UA: 1.03 (ref 1–1.03)
UROBILINOGEN, URINE: 1 E.U./DL
WBC # BLD: 5.5 K/UL (ref 4.5–11)
WBC UA: NORMAL /HPF (ref 0–5)

## 2018-08-07 PROCEDURE — G8417 CALC BMI ABV UP PARAM F/U: HCPCS | Performed by: OBSTETRICS & GYNECOLOGY

## 2018-08-07 PROCEDURE — G8427 DOCREV CUR MEDS BY ELIG CLIN: HCPCS | Performed by: OBSTETRICS & GYNECOLOGY

## 2018-08-07 PROCEDURE — 1036F TOBACCO NON-USER: CPT | Performed by: OBSTETRICS & GYNECOLOGY

## 2018-08-07 PROCEDURE — 99204 OFFICE O/P NEW MOD 45 MIN: CPT | Performed by: OBSTETRICS & GYNECOLOGY

## 2018-08-07 RX ORDER — PNV NO.95/FERROUS FUM/FOLIC AC 28MG-0.8MG
1 TABLET ORAL DAILY
Qty: 30 TABLET | Refills: 11 | Status: SHIPPED | OUTPATIENT
Start: 2018-08-07 | End: 2018-09-25

## 2018-08-07 RX ORDER — ONDANSETRON 8 MG/1
8 TABLET, ORALLY DISINTEGRATING ORAL EVERY 8 HOURS PRN
Qty: 30 TABLET | Refills: 1 | Status: SHIPPED | OUTPATIENT
Start: 2018-08-07 | End: 2018-09-25

## 2018-08-07 RX ORDER — QUETIAPINE FUMARATE 100 MG/1
TABLET, FILM COATED ORAL
Refills: 0 | COMMUNITY
Start: 2018-07-29 | End: 2018-08-07 | Stop reason: ALTCHOICE

## 2018-08-07 ASSESSMENT — ENCOUNTER SYMPTOMS
SHORTNESS OF BREATH: 0
ABDOMINAL PAIN: 0
BLOOD IN STOOL: 0
CONSTIPATION: 0
WHEEZING: 0
DIARRHEA: 0
COUGH: 0

## 2018-08-07 NOTE — PROGRESS NOTES
Former User    Alcohol use No    Drug use: No    Sexual activity: Yes     Partners: Male     Other Topics Concern    Not on file     Social History Narrative    ** Merged History Encounter **              MEDICATIONS:  Current Outpatient Prescriptions on File Prior to Visit   Medication Sig Dispense Refill    Prenatal Vit-Fe Fumarate-FA (PRENATAL VITAMIN) 27-0.8 MG TABS qd 90 tablet 3     No current facility-administered medications on file prior to visit. ALLERGIES:  Allergies as of 08/07/2018    (No Known Allergies)           Gynecologic History:     Patient's last menstrual period was 05/26/2018.      ________________________________________________________________________  REVIEW OF SYSTEMS:       Review of Systems   Respiratory: Negative for cough, shortness of breath and wheezing. Cardiovascular: Negative for chest pain, palpitations and leg swelling. Gastrointestinal: Negative for abdominal pain, blood in stool, constipation and diarrhea. Genitourinary: Negative for difficulty urinating, dysuria, flank pain and hematuria. Skin: Negative. Psychiatric/Behavioral: Negative. PHYSICAL Exam:    Constitutional:  Vitals:    08/07/18 1500   BP: 122/80   Weight: 191 lb (86.6 kg)   Height: 5' 8\" (1.727 m)       Physical Exam   Constitutional: She is oriented to person, place, and time. She appears well-developed and well-nourished. HENT:   Head: Normocephalic and atraumatic. Cardiovascular: Normal rate and regular rhythm. Abdominal: Soft. Normal appearance. There is no tenderness. Musculoskeletal: Normal range of motion. Neurological: She is alert and oriented to person, place, and time. Skin: Skin is warm and intact. No lesion noted. No erythema. Psychiatric: She has a normal mood and affect.  Her behavior is normal. Judgment and thought content normal.         ASSESSMENT:      19 y.o. Annual   Diagnosis Orders   1. Amenorrhea  Alpha Fetoprotein, Maternal    Carrier Screen, 4 Conditions ([place])    CBC Auto Differential    Hepatitis B Surface Antigen    HIV-1,-2 w/Reflex to HIV-1 Western Blot    Pain Management Drug Screen    Prenatal Testing for Fetal Aneuploidy    RPR Reflex to Titer and TPPA    Rubella antibody, IgG    Type and screen    Urinalysis    Urine Culture    Varicella Zoster Antibody, IgG    US OB 14 Plus Weeks Single or First Gestation   2. 10 weeks gestation of pregnancy                                    Counseling Completed:          PLAN:      No orders of the defined types were placed in this encounter. No orders of the defined types were placed in this encounter. No Follow-up on file. Pt was seen with total face to face time of 45 minutes with more than 50% of the visit being counseling and education regarding the encounter dx of   1. Amenorrhea    2. 10 weeks gestation of pregnancy    . I, Dr Socrates Weber, personally performed the services described in this documentation, as scribed by Juan Pablo Manning in my presence, and it is both accurate and complete.  Electronically signed by: Socrates Weber MD

## 2018-08-07 NOTE — LETTER
JONATHAN MOYER [facility]  [facility] [address]  Phone: [phone]  Fax: [phone]      August 7, 2018    Heraclio Rdz  [address]  [address] [place] [address]      To Whom It May Concern: This letter is to confirm that Heraclio Rdz is an obstetrical patient followed in our practice. Heraclio Rdz is currently pregnant with one baby and her expected due date is 3/2/19. If you have any questions or concerns, please don't hesitate to call.     Sincerely,    Elissa Atkins MD

## 2018-08-09 LAB
RPR: NORMAL
URINE CULTURE, ROUTINE: NORMAL

## 2018-08-10 LAB
6-ACETYLMORPHINE: NOT DETECTED
7-AMINOCLONAZEPAM: NOT DETECTED
ALPHA-OH-ALPRAZOLAM: NOT DETECTED
ALPRAZOLAM: NOT DETECTED
AMPHETAMINE: NOT DETECTED
BARBITURATES: NOT DETECTED
BENZOYLECGONINE: NOT DETECTED
BUPRENORPHINE: NOT DETECTED
CARISOPRODOL: NOT DETECTED
CLONAZEPAM: NOT DETECTED
CODEINE: NOT DETECTED
CREATININE URINE: 286.2 MG/DL (ref 20–400)
DIAZEPAM: NOT DETECTED
EER PAIN MGT DRUG PANEL, HIGH RES/EMIT U: NORMAL
ETHYL GLUCURONIDE: NOT DETECTED
FENTANYL: NOT DETECTED
HIV-1 AND HIV-2 ANTIBODIES: NEGATIVE
HYDROCODONE: NOT DETECTED
HYDROMORPHONE: NOT DETECTED
LORAZEPAM: NOT DETECTED
MARIJUANA METABOLITE: NOT DETECTED
MDA: NOT DETECTED
MDEA: NOT DETECTED
MDMA URINE: NOT DETECTED
MEPERIDINE: NOT DETECTED
METHADONE: NOT DETECTED
METHAMPHETAMINE: NOT DETECTED
METHYLPHENIDATE: NOT DETECTED
MIDAZOLAM: NOT DETECTED
MORPHINE: NOT DETECTED
NORBUPRENORPHINE, FREE: NOT DETECTED
NORDIAZEPAM: NOT DETECTED
NORFENTANYL: NOT DETECTED
NORHYDROCODONE, URINE: NOT DETECTED
NOROXYCODONE: NOT DETECTED
NOROXYMORPHONE, URINE: NOT DETECTED
OXAZEPAM: NOT DETECTED
OXYCODONE: NOT DETECTED
OXYMORPHONE: NOT DETECTED
PAIN MANAGEMENT DRUG PANEL: NORMAL
PCP: NOT DETECTED
PHENTERMINE: NOT DETECTED
PROPOXYPHENE: NOT DETECTED
TAPENTADOL, URINE: NOT DETECTED
TAPENTADOL-O-SULFATE, URINE: NOT DETECTED
TEMAZEPAM: NOT DETECTED
TRAMADOL: NOT DETECTED
VZV IGG SER QL IA: 1.07
ZOLPIDEM: NOT DETECTED

## 2018-08-14 LAB
EER NON INVASIVE PRENATAL ANEUPLOIDY: NORMAL
FETAL FRACTION: 7.3
FETAL GENDER: NORMAL
GESTATIONAL AGE (DAYS): 3
GESTATIONAL AGE(WEEKS): 10
Lab: NORMAL
MATERNAL WEIGHT: 191
MONOSOMY X: NORMAL
REPORT FETUS GENDER: YES
TRIPLOIDY (VANISHING TWIN): NORMAL
TRISOMY 13 RISK: NORMAL
TRISOMY 18 RISK ASSESSMENT: NORMAL
TRISOMY 21 RISK: NORMAL

## 2018-08-28 LAB
CARRIER SCREEN, 4 CONDITIONS: NORMAL
EER CARRIER SCREEN, 4 CONDITIONS: NORMAL

## 2018-09-04 ENCOUNTER — APPOINTMENT (OUTPATIENT)
Dept: ULTRASOUND IMAGING | Age: 18
End: 2018-09-04
Payer: COMMERCIAL

## 2018-09-04 ENCOUNTER — HOSPITAL ENCOUNTER (EMERGENCY)
Age: 18
Discharge: HOME | End: 2018-09-04
Payer: COMMERCIAL

## 2018-09-04 VITALS
HEIGHT: 68 IN | BODY MASS INDEX: 29.25 KG/M2 | RESPIRATION RATE: 18 BRPM | HEART RATE: 86 BPM | TEMPERATURE: 98 F | OXYGEN SATURATION: 98 % | DIASTOLIC BLOOD PRESSURE: 77 MMHG | SYSTOLIC BLOOD PRESSURE: 122 MMHG | WEIGHT: 193 LBS

## 2018-09-04 DIAGNOSIS — O46.90 VAGINAL BLEEDING DURING PREGNANCY: Primary | ICD-10-CM

## 2018-09-04 LAB
ABO/RH: NORMAL
ALBUMIN SERPL-MCNC: 4 G/DL (ref 3.9–4.9)
ALP BLD-CCNC: 38 U/L (ref 40–130)
ALT SERPL-CCNC: 8 U/L (ref 0–33)
ANION GAP SERPL CALCULATED.3IONS-SCNC: 13 MEQ/L (ref 7–13)
ANTIBODY SCREEN: NORMAL
AST SERPL-CCNC: 13 U/L (ref 0–35)
BACTERIA: NORMAL /HPF
BASOPHILS ABSOLUTE: 0.1 K/UL (ref 0–0.2)
BASOPHILS RELATIVE PERCENT: 0.5 %
BILIRUB SERPL-MCNC: 0.3 MG/DL (ref 0–1.2)
BILIRUBIN URINE: ABNORMAL
BLOOD, URINE: NEGATIVE
BUN BLDV-MCNC: 7 MG/DL (ref 6–20)
CALCIUM SERPL-MCNC: 9.2 MG/DL (ref 8.6–10.2)
CHLORIDE BLD-SCNC: 103 MEQ/L (ref 98–107)
CHP ED QC CHECK: NORMAL
CLARITY: CLEAR
CO2: 21 MEQ/L (ref 22–29)
COLOR: ABNORMAL
CREAT SERPL-MCNC: 0.39 MG/DL (ref 0.5–0.9)
CRYSTALS, UA: NORMAL
EOSINOPHILS ABSOLUTE: 0 K/UL (ref 0–0.7)
EOSINOPHILS RELATIVE PERCENT: 0.1 %
EPITHELIAL CELLS, UA: NORMAL /HPF
GFR AFRICAN AMERICAN: >60
GFR NON-AFRICAN AMERICAN: >60
GLOBULIN: 2.9 G/DL (ref 2.3–3.5)
GLUCOSE BLD-MCNC: 82 MG/DL (ref 74–109)
GLUCOSE URINE: NEGATIVE MG/DL
GONADOTROPIN, CHORIONIC (HCG) QUANT: NORMAL MIU/ML
HCT VFR BLD CALC: 35.3 % (ref 37–47)
HEMOGLOBIN: 12.4 G/DL (ref 12–16)
KETONES, URINE: 40 MG/DL
LEUKOCYTE ESTERASE, URINE: NEGATIVE
LIPASE: 24 U/L (ref 13–60)
LYMPHOCYTES ABSOLUTE: 1.6 K/UL (ref 1–4.8)
LYMPHOCYTES RELATIVE PERCENT: 15.3 %
MCH RBC QN AUTO: 28.6 PG (ref 27–31.3)
MCHC RBC AUTO-ENTMCNC: 35 % (ref 33–37)
MCV RBC AUTO: 81.7 FL (ref 82–100)
MONOCYTES ABSOLUTE: 0.6 K/UL (ref 0.2–0.8)
MONOCYTES RELATIVE PERCENT: 5.9 %
MUCUS: PRESENT
NEUTROPHILS ABSOLUTE: 7.9 K/UL (ref 1.4–6.5)
NEUTROPHILS RELATIVE PERCENT: 78.2 %
NITRITE, URINE: NEGATIVE
PDW BLD-RTO: 14.1 % (ref 11.5–14.5)
PH UA: 6 (ref 5–9)
PLATELET # BLD: 257 K/UL (ref 130–400)
POTASSIUM SERPL-SCNC: 4 MEQ/L (ref 3.5–5.1)
PREGNANCY TEST URINE, POC: POSITIVE
PROTEIN UA: 100 MG/DL
RBC # BLD: 4.32 M/UL (ref 4.2–5.4)
RBC UA: NORMAL /HPF (ref 0–2)
SODIUM BLD-SCNC: 137 MEQ/L (ref 132–144)
SPECIFIC GRAVITY UA: 1.02 (ref 1–1.03)
TOTAL PROTEIN: 6.9 G/DL (ref 6.4–8.1)
URINE REFLEX TO CULTURE: YES
UROBILINOGEN, URINE: 1 E.U./DL
WBC # BLD: 10.2 K/UL (ref 4.5–11)
WBC UA: NORMAL /HPF (ref 0–5)

## 2018-09-04 PROCEDURE — 36415 COLL VENOUS BLD VENIPUNCTURE: CPT

## 2018-09-04 PROCEDURE — 81001 URINALYSIS AUTO W/SCOPE: CPT

## 2018-09-04 PROCEDURE — 85025 COMPLETE CBC W/AUTO DIFF WBC: CPT

## 2018-09-04 PROCEDURE — 86900 BLOOD TYPING SEROLOGIC ABO: CPT

## 2018-09-04 PROCEDURE — 84702 CHORIONIC GONADOTROPIN TEST: CPT

## 2018-09-04 PROCEDURE — 2580000003 HC RX 258: Performed by: PHYSICIAN ASSISTANT

## 2018-09-04 PROCEDURE — 86901 BLOOD TYPING SEROLOGIC RH(D): CPT

## 2018-09-04 PROCEDURE — 86850 RBC ANTIBODY SCREEN: CPT

## 2018-09-04 PROCEDURE — 99284 EMERGENCY DEPT VISIT MOD MDM: CPT

## 2018-09-04 PROCEDURE — 80053 COMPREHEN METABOLIC PANEL: CPT

## 2018-09-04 PROCEDURE — 83690 ASSAY OF LIPASE: CPT

## 2018-09-04 PROCEDURE — 87086 URINE CULTURE/COLONY COUNT: CPT

## 2018-09-04 PROCEDURE — 76805 OB US >/= 14 WKS SNGL FETUS: CPT

## 2018-09-04 RX ORDER — 0.9 % SODIUM CHLORIDE 0.9 %
1000 INTRAVENOUS SOLUTION INTRAVENOUS ONCE
Status: COMPLETED | OUTPATIENT
Start: 2018-09-04 | End: 2018-09-04

## 2018-09-04 RX ADMIN — SODIUM CHLORIDE 1000 ML: 9 INJECTION, SOLUTION INTRAVENOUS at 15:03

## 2018-09-04 ASSESSMENT — PAIN SCALES - GENERAL: PAINLEVEL_OUTOF10: 1

## 2018-09-04 ASSESSMENT — ENCOUNTER SYMPTOMS
BACK PAIN: 0
DIARRHEA: 0
VOMITING: 0
SORE THROAT: 0
PHOTOPHOBIA: 0
ABDOMINAL PAIN: 0
SHORTNESS OF BREATH: 0
EYE PAIN: 0
COUGH: 0
NAUSEA: 0
RHINORRHEA: 0

## 2018-09-04 ASSESSMENT — PAIN DESCRIPTION - LOCATION: LOCATION: ABDOMEN

## 2018-09-04 ASSESSMENT — PAIN DESCRIPTION - DESCRIPTORS: DESCRIPTORS: PRESSURE

## 2018-09-04 ASSESSMENT — PAIN DESCRIPTION - PAIN TYPE: TYPE: ACUTE PAIN

## 2018-09-04 NOTE — ED NOTES
Pt resfusing oconnor for ultrasound. Clau PALACIO notified. Ultrasound tech called & per ultrasound tech it is protocol that pt is to have oconnor for US through ER & without a oconnor they are unable to do the ultrasound-Hope PA aware.      Elvi Alexandra RN  09/04/18 1027 hospitals Iris Ferrara RN  09/04/18 8249

## 2018-09-04 NOTE — ED NOTES
Ultrasound tech advised she will not take patient for test due to the fact she only drank a small amount of water. Clau advised.       Neida Mcmanus RN  09/04/18 0602

## 2018-09-04 NOTE — ED NOTES
Advised patient ultrasound was coming to pick patient up. Asked patient if she drank her water and she said most of it.        Neida Mcmanus RN  09/04/18 9758

## 2018-09-04 NOTE — ED PROVIDER NOTES
[facility] ED  eMERGENCY dEPARTMENT eNCOUnter      Pt Name: Garett Lehman  MRN: [id number]  [place] 2000  Date of evaluation: 9/4/2018  Provider: Garcia Alva PA-C      HISTORY OF PRESENT ILLNESS    Garett Lehman is a 19 y.o. female who presents to the Emergency Department with Vaginal bleeding and cramping. Patient states that she is 14 weeks pregnant. Patient denies any fevers chills. She not take any medication. Patient states she woke up this morning with a dark red bleeding. REVIEW OF SYSTEMS       Review of Systems   Constitutional: Negative for chills, diaphoresis, fatigue and fever. HENT: Negative for congestion, rhinorrhea and sore throat. Eyes: Negative for photophobia and pain. Respiratory: Negative for cough and shortness of breath. Cardiovascular: Negative for chest pain and palpitations. Gastrointestinal: Negative for abdominal pain, diarrhea, nausea and vomiting. Genitourinary: Positive for pelvic pain and vaginal bleeding. Negative for dysuria and flank pain. Musculoskeletal: Negative for back pain. Skin: Negative for rash. Neurological: Negative for dizziness, light-headedness and headaches. All other systems reviewed and are negative. PAST MEDICAL HISTORY     Past Medical History:   Diagnosis Date    Anxiety     Depression     Headache(784.0)          SURGICAL HISTORY     No past surgical history on file.       CURRENT MEDICATIONS       Discharge Medication List as of 9/4/2018  6:33 PM      CONTINUE these medications which have NOT CHANGED    Details   ondansetron (ZOFRAN ODT) 8 MG disintegrating tablet Take 1 tablet by mouth every 8 hours as needed for Nausea or Vomiting, Disp-30 tablet, R-1Normal      !! Prenatal Vit-Fe Fumarate-FA (PRENATAL VITAMIN) 27-0.8 MG TABS Take 1 tablet by mouth daily, Disp-30 tablet, R-11Normal      !! Prenatal Vit-Fe Fumarate-FA (PRENATAL VITAMIN) 27-0.8 MG TABS qd, Disp-90 tablet, R-3Normal       !! -

## 2018-09-05 LAB — URINE CULTURE, ROUTINE: NORMAL

## 2018-09-11 ENCOUNTER — INITIAL PRENATAL (OUTPATIENT)
Dept: OBGYN CLINIC | Age: 18
End: 2018-09-11
Payer: COMMERCIAL

## 2018-09-11 VITALS
BODY MASS INDEX: 28.89 KG/M2 | HEART RATE: 84 BPM | DIASTOLIC BLOOD PRESSURE: 80 MMHG | SYSTOLIC BLOOD PRESSURE: 116 MMHG | WEIGHT: 190 LBS

## 2018-09-11 DIAGNOSIS — Z11.51 SCREENING FOR HPV (HUMAN PAPILLOMAVIRUS): ICD-10-CM

## 2018-09-11 DIAGNOSIS — Z11.3 ROUTINE SCREENING FOR STI (SEXUALLY TRANSMITTED INFECTION): ICD-10-CM

## 2018-09-11 DIAGNOSIS — Z3A.15 15 WEEKS GESTATION OF PREGNANCY: ICD-10-CM

## 2018-09-11 DIAGNOSIS — Z23 NEED FOR INFLUENZA VACCINATION: ICD-10-CM

## 2018-09-11 DIAGNOSIS — Z34.02 PREGNANCY, SUPERVISION OF FIRST, SECOND TRIMESTER: ICD-10-CM

## 2018-09-11 DIAGNOSIS — Z34.02 PREGNANCY, SUPERVISION OF FIRST, SECOND TRIMESTER: Primary | ICD-10-CM

## 2018-09-11 PROCEDURE — 90686 IIV4 VACC NO PRSV 0.5 ML IM: CPT | Performed by: OBSTETRICS & GYNECOLOGY

## 2018-09-11 PROCEDURE — 90471 IMMUNIZATION ADMIN: CPT | Performed by: OBSTETRICS & GYNECOLOGY

## 2018-09-11 RX ORDER — PNV NO.95/FERROUS FUM/FOLIC AC 28MG-0.8MG
1 TABLET ORAL DAILY
Qty: 30 TABLET | Refills: 11 | Status: SHIPPED | OUTPATIENT
Start: 2018-09-11 | End: 2019-09-13 | Stop reason: ALTCHOICE

## 2018-09-12 LAB
CLUE CELLS: NORMAL
TRICHOMONAS PREP: NORMAL
TRICHOMONAS VAGINALIS SCREEN: NEGATIVE
YEAST WET PREP: NORMAL

## 2018-09-14 LAB
C TRACH DNA GENITAL QL NAA+PROBE: NEGATIVE
N. GONORRHOEAE DNA: NEGATIVE

## 2018-09-18 LAB
HPV COMMENT: ABNORMAL
HPV TYPE 16: NOT DETECTED
HPV TYPE 18: NOT DETECTED
HPVOH (OTHER TYPES): DETECTED

## 2018-09-25 ENCOUNTER — OFFICE VISIT (OUTPATIENT)
Dept: PRIMARY CARE CLINIC | Age: 18
End: 2018-09-25
Payer: COMMERCIAL

## 2018-09-25 VITALS
SYSTOLIC BLOOD PRESSURE: 124 MMHG | WEIGHT: 191 LBS | HEIGHT: 68 IN | BODY MASS INDEX: 28.95 KG/M2 | DIASTOLIC BLOOD PRESSURE: 76 MMHG

## 2018-09-25 DIAGNOSIS — B07.9 VIRAL WARTS, UNSPECIFIED TYPE: ICD-10-CM

## 2018-09-25 DIAGNOSIS — Z3A.17 17 WEEKS GESTATION OF PREGNANCY: ICD-10-CM

## 2018-09-25 DIAGNOSIS — F42.9 OBSESSIVE-COMPULSIVE DISORDER, UNSPECIFIED TYPE: ICD-10-CM

## 2018-09-25 DIAGNOSIS — R20.8 BURNING SENSATION: ICD-10-CM

## 2018-09-25 DIAGNOSIS — F33.9 RECURRENT DEPRESSION (HCC): Primary | ICD-10-CM

## 2018-09-25 DIAGNOSIS — G43.009 MIGRAINE WITHOUT AURA AND WITHOUT STATUS MIGRAINOSUS, NOT INTRACTABLE: ICD-10-CM

## 2018-09-25 DIAGNOSIS — R11.2 NAUSEA AND VOMITING, INTRACTABILITY OF VOMITING NOT SPECIFIED, UNSPECIFIED VOMITING TYPE: ICD-10-CM

## 2018-09-25 PROCEDURE — G8427 DOCREV CUR MEDS BY ELIG CLIN: HCPCS | Performed by: FAMILY MEDICINE

## 2018-09-25 PROCEDURE — 99213 OFFICE O/P EST LOW 20 MIN: CPT | Performed by: FAMILY MEDICINE

## 2018-09-25 PROCEDURE — G8417 CALC BMI ABV UP PARAM F/U: HCPCS | Performed by: FAMILY MEDICINE

## 2018-09-25 PROCEDURE — 17003 DESTRUCT PREMALG LES 2-14: CPT | Performed by: FAMILY MEDICINE

## 2018-09-25 PROCEDURE — 17000 DESTRUCT PREMALG LESION: CPT | Performed by: FAMILY MEDICINE

## 2018-09-25 PROCEDURE — 1036F TOBACCO NON-USER: CPT | Performed by: FAMILY MEDICINE

## 2018-09-25 ASSESSMENT — ENCOUNTER SYMPTOMS
NAUSEA: 1
VOMITING: 1
DIARRHEA: 0
CHOKING: 0
EYE REDNESS: 0
APNEA: 0
STRIDOR: 0
SHORTNESS OF BREATH: 0
CHEST TIGHTNESS: 0
WHEEZING: 0
PHOTOPHOBIA: 0
FACIAL SWELLING: 0
CONSTIPATION: 0
EYE PAIN: 0
COLOR CHANGE: 0
EYE DISCHARGE: 0
ABDOMINAL PAIN: 0

## 2018-09-25 NOTE — PROGRESS NOTES
Subjective:      Patient ID: Augustina Jacobsen is a 19 y.o. female who presents today for:  Chief Complaint   Patient presents with    Verruca Vulgaris     patient c/o warts on bilateral hands that she would like to have removed.  Nausea     patient c/o daily nausea and vomiting due to being 17 weeks pregnant. HPI   Verruca Vulgaris  Patient presents today for warts on bilateral hands that she would like removed today. Patient has c/o of nausea and vomiting due to being 17 weeks pregnant. Patient was seeing Dr. Mike Wilder. Fu depression, migraines,ocd  Chronic, stable    Past Medical History:   Diagnosis Date    Anxiety     Depression     Headache(784.0)      No past surgical history on file. Family History   Problem Relation Age of Onset    Cancer Mother     Arthritis Father     High Blood Pressure Father     Heart Disease Father     Heart Disease Maternal Grandmother     Diabetes Paternal Grandfather      Social History     Social History    Marital status: Single     Spouse name: N/A    Number of children: N/A    Years of education: N/A     Occupational History    Not on file. Social History Main Topics    Smoking status: Never Smoker    Smokeless tobacco: Former User    Alcohol use No    Drug use: No    Sexual activity: Yes     Partners: Male     Other Topics Concern    Not on file     Social History Narrative    ** Merged History Encounter **          Allergies:  Patient has no known allergies. Review of Systems   Constitutional: Negative for activity change, appetite change, chills, diaphoresis and fever. HENT: Negative for congestion, ear discharge, ear pain, facial swelling, hearing loss and mouth sores. Eyes: Negative for photophobia, pain, discharge and redness. Respiratory: Negative for apnea, choking, chest tightness, shortness of breath, wheezing and stridor. Cardiovascular: Negative for chest pain, palpitations and leg swelling.    Gastrointestinal: Positive for nausea and vomiting. Negative for abdominal pain, constipation and diarrhea. Endocrine: Negative for cold intolerance, heat intolerance, polydipsia and polyphagia. Genitourinary: Negative for dysuria and frequency. Musculoskeletal: Negative for gait problem, joint swelling, neck pain and neck stiffness. Skin: Negative for color change, pallor, rash and wound. Allergic/Immunologic: Negative for immunocompromised state. Neurological: Negative for dizziness, tremors, syncope, facial asymmetry, speech difficulty, light-headedness and headaches. Psychiatric/Behavioral: Negative for confusion and hallucinations. The patient is not nervous/anxious and is not hyperactive. Objective:   /76   Ht 5' 8\" (1.727 m)   Wt 191 lb (86.6 kg)   LMP 05/26/2018   BMI 29.04 kg/m²     Physical Exam      PHYSICAL EXAMINATION:   VITAL SIGNS: are as recorded. GENERAL:  The patient appears well nourished and well-developed, and with normal affect. No acute respiratory distress. Alert and oriented times 3. No skin rashes. HEENT:  TMs normal bilaterally. Canals and ears normal. Pharynx is clear. Extraocular eye motions intact and pain free. Pupils reactive and equally round. Sclerae and conjunctivae clear, normocephalic and atraumatic. RESPIRATORY:  Clear and equal breath sounds with no acute respiratory distress. HEART: Regular rhythm without murmur, rub or gallop. ABDOMEN:  soft, nontender. No masses, guarding or rebound. Normoactive bowel sounds. LOW BACK: No tenderness to palpation of inferior lumbar spine or either sacroiliac joint area. Warts x 5 hands    Assessment:      Diagnosis Orders   1. Recurrent depression ([address]     2. Nausea and vomiting, intractability of vomiting not specified, unspecified vomiting type     3.  Migraine without aura and without status migrainosus, not intractable     4. 17 weeks gestation of pregnancy  [facility] Faraz Campuzano MD   5. Burning sensation  [address] - [place], FIRST LESION    57891 - [place],2-14 LESIONS   6. Viral warts, unspecified type  88796 - [place], FIRST LESION    [address]24 - [place],2-14 LESIONS   7. Obsessive-compulsive disorder, unspecified type         Plan:      Orders Placed This Encounter   Procedures   [address], MD     Referral Priority:   Routine     Referral Type:   Eval and Treat     Referral Reason:   Specialty Services Required     Referred to Provider:   Lawson Oseguera MD     Requested Specialty:   Obstetrics & Gynecology     Number of Visits Requested:   1    [address] - [place], FIRST LESION    [address]4464 -  [facility]     No orders of the defined types were placed in this encounter. Risks were explained. Consent was obtained. Liquid nitrogen was used to treat lesions. Pt tolerated procedure well. There were no complications. Warts x 5 hands    Controlled Substances Monitoring:      Return in about 3 weeks (around 10/16/2018). Mark Olszewski, RMA  , am scribing for and in the presence of Karl Stevenson MD. Electronically signed by :  Jamir Tripathi MD, personally performed the services described in this documentation, as scribed by SURI Palacios   in my presence, and it is both accurate and complete.  Electronically signed by: Karl Stevenson MD    9/25/18 4:01 PM    Karl Stevenson MD

## 2018-10-06 ENCOUNTER — HOSPITAL ENCOUNTER (OUTPATIENT)
Dept: ULTRASOUND IMAGING | Age: 18
Discharge: HOME | End: 2018-10-08
Payer: COMMERCIAL

## 2018-10-06 DIAGNOSIS — N91.2 AMENORRHEA: ICD-10-CM

## 2018-10-06 PROCEDURE — 76805 OB US >/= 14 WKS SNGL FETUS: CPT

## 2018-10-09 ENCOUNTER — ROUTINE PRENATAL (OUTPATIENT)
Dept: OBGYN CLINIC | Age: 18
End: 2018-10-09

## 2018-10-09 VITALS
DIASTOLIC BLOOD PRESSURE: 70 MMHG | HEART RATE: 82 BPM | WEIGHT: 189 LBS | BODY MASS INDEX: 28.74 KG/M2 | SYSTOLIC BLOOD PRESSURE: 132 MMHG

## 2018-10-09 DIAGNOSIS — Z3A.19 19 WEEKS GESTATION OF PREGNANCY: ICD-10-CM

## 2018-10-09 DIAGNOSIS — K21.9 GASTROESOPHAGEAL REFLUX DISEASE WITHOUT ESOPHAGITIS: ICD-10-CM

## 2018-10-09 DIAGNOSIS — R10.11 RUQ PAIN: ICD-10-CM

## 2018-10-09 DIAGNOSIS — Z34.02 ENCOUNTER FOR SUPERVISION OF NORMAL FIRST PREGNANCY IN SECOND TRIMESTER: Primary | ICD-10-CM

## 2018-10-09 PROCEDURE — 0502F SUBSEQUENT PRENATAL CARE: CPT | Performed by: OBSTETRICS & GYNECOLOGY

## 2018-10-10 ENCOUNTER — TELEPHONE (OUTPATIENT)
Dept: OBGYN CLINIC | Age: 18
End: 2018-10-10

## 2018-10-11 RX ORDER — OMEPRAZOLE 10 MG/1
10 CAPSULE, DELAYED RELEASE ORAL DAILY
Qty: 30 CAPSULE | Refills: 3 | Status: SHIPPED | OUTPATIENT
Start: 2018-10-11 | End: 2019-01-03 | Stop reason: SDUPTHER

## 2018-10-28 ENCOUNTER — HOSPITAL ENCOUNTER (OUTPATIENT)
Dept: ULTRASOUND IMAGING | Age: 18
Discharge: HOME | End: 2018-10-30
Payer: COMMERCIAL

## 2018-10-28 DIAGNOSIS — R10.11 RUQ PAIN: ICD-10-CM

## 2018-10-28 DIAGNOSIS — Z34.02 ENCOUNTER FOR SUPERVISION OF NORMAL FIRST PREGNANCY IN SECOND TRIMESTER: ICD-10-CM

## 2018-10-28 DIAGNOSIS — K21.9 GASTROESOPHAGEAL REFLUX DISEASE WITHOUT ESOPHAGITIS: ICD-10-CM

## 2018-10-28 DIAGNOSIS — Z3A.19 19 WEEKS GESTATION OF PREGNANCY: ICD-10-CM

## 2018-10-28 PROCEDURE — 76805 OB US >/= 14 WKS SNGL FETUS: CPT

## 2018-10-28 PROCEDURE — 76705 ECHO EXAM OF ABDOMEN: CPT

## 2018-11-09 ENCOUNTER — ROUTINE PRENATAL (OUTPATIENT)
Dept: OBGYN CLINIC | Age: 18
End: 2018-11-09
Payer: COMMERCIAL

## 2018-11-09 VITALS
SYSTOLIC BLOOD PRESSURE: 104 MMHG | WEIGHT: 195 LBS | DIASTOLIC BLOOD PRESSURE: 70 MMHG | HEART RATE: 86 BPM | BODY MASS INDEX: 29.65 KG/M2

## 2018-11-09 DIAGNOSIS — Z34.00 ENCOUNTER FOR SUPERVISION PREGNANCY IN PRIMIGRAVIDA, ANTEPARTUM: Primary | ICD-10-CM

## 2018-11-09 DIAGNOSIS — Z3A.23 23 WEEKS GESTATION OF PREGNANCY: ICD-10-CM

## 2018-11-09 PROCEDURE — G8427 DOCREV CUR MEDS BY ELIG CLIN: HCPCS | Performed by: OBSTETRICS & GYNECOLOGY

## 2018-11-09 PROCEDURE — 1036F TOBACCO NON-USER: CPT | Performed by: OBSTETRICS & GYNECOLOGY

## 2018-11-09 PROCEDURE — G8482 FLU IMMUNIZE ORDER/ADMIN: HCPCS | Performed by: OBSTETRICS & GYNECOLOGY

## 2018-11-09 PROCEDURE — G8417 CALC BMI ABV UP PARAM F/U: HCPCS | Performed by: OBSTETRICS & GYNECOLOGY

## 2018-11-09 PROCEDURE — 99213 OFFICE O/P EST LOW 20 MIN: CPT | Performed by: OBSTETRICS & GYNECOLOGY

## 2018-12-04 DIAGNOSIS — Z3A.27 27 WEEKS GESTATION OF PREGNANCY: ICD-10-CM

## 2018-12-04 LAB
GLUCOSE, 1HR PP: 103 MG/DL (ref 60–140)
HCT VFR BLD CALC: 36.3 % (ref 37–47)
HEMOGLOBIN: 12.3 G/DL (ref 12–16)

## 2019-01-02 ENCOUNTER — HOSPITAL ENCOUNTER (OUTPATIENT)
Age: 19
Discharge: HOME | End: 2019-01-02
Attending: OBSTETRICS & GYNECOLOGY | Admitting: OBSTETRICS & GYNECOLOGY
Payer: COMMERCIAL

## 2019-01-02 VITALS
DIASTOLIC BLOOD PRESSURE: 80 MMHG | TEMPERATURE: 98.4 F | HEART RATE: 75 BPM | SYSTOLIC BLOOD PRESSURE: 125 MMHG | RESPIRATION RATE: 16 BRPM

## 2019-01-02 LAB
ALBUMIN SERPL-MCNC: 3.6 G/DL (ref 3.9–4.9)
ALP BLD-CCNC: 85 U/L (ref 40–130)
ALT SERPL-CCNC: 7 U/L (ref 0–33)
AMPHETAMINE SCREEN, URINE: NORMAL
ANION GAP SERPL CALCULATED.3IONS-SCNC: 13 MEQ/L (ref 7–13)
AST SERPL-CCNC: 11 U/L (ref 0–35)
BACTERIA: NEGATIVE /HPF
BARBITURATE SCREEN URINE: NORMAL
BASOPHILS ABSOLUTE: 0 K/UL (ref 0–0.2)
BASOPHILS RELATIVE PERCENT: 0.4 %
BENZODIAZEPINE SCREEN, URINE: NORMAL
BILIRUB SERPL-MCNC: <0.2 MG/DL (ref 0–1.2)
BILIRUBIN URINE: NEGATIVE
BLOOD, URINE: NEGATIVE
BUN BLDV-MCNC: 12 MG/DL (ref 6–20)
CALCIUM SERPL-MCNC: 9.5 MG/DL (ref 8.6–10.2)
CANNABINOID SCREEN URINE: NORMAL
CHLORIDE BLD-SCNC: 99 MEQ/L (ref 98–107)
CLARITY: CLEAR
CO2: 24 MEQ/L (ref 22–29)
COCAINE METABOLITE SCREEN URINE: NORMAL
COLOR: YELLOW
CREAT SERPL-MCNC: 0.46 MG/DL (ref 0.5–0.9)
EOSINOPHILS ABSOLUTE: 0.1 K/UL (ref 0–0.7)
EOSINOPHILS RELATIVE PERCENT: 0.8 %
EPITHELIAL CELLS, UA: ABNORMAL /HPF (ref 0–5)
GFR AFRICAN AMERICAN: >60
GFR NON-AFRICAN AMERICAN: >60
GLOBULIN: 3.4 G/DL (ref 2.3–3.5)
GLUCOSE BLD-MCNC: 124 MG/DL (ref 74–109)
GLUCOSE URINE: NEGATIVE MG/DL
HCT VFR BLD CALC: 35.9 % (ref 37–47)
HEMOGLOBIN: 11.8 G/DL (ref 12–16)
HYALINE CASTS: ABNORMAL /HPF (ref 0–5)
KETONES, URINE: NEGATIVE MG/DL
LEUKOCYTE ESTERASE, URINE: ABNORMAL
LYMPHOCYTES ABSOLUTE: 1.3 K/UL (ref 1–4.8)
LYMPHOCYTES RELATIVE PERCENT: 16.4 %
Lab: NORMAL
MCH RBC QN AUTO: 26.4 PG (ref 27–31.3)
MCHC RBC AUTO-ENTMCNC: 32.8 % (ref 33–37)
MCV RBC AUTO: 80.4 FL (ref 82–100)
MONOCYTES ABSOLUTE: 0.4 K/UL (ref 0.2–0.8)
MONOCYTES RELATIVE PERCENT: 5.7 %
NEUTROPHILS ABSOLUTE: 6 K/UL (ref 1.4–6.5)
NEUTROPHILS RELATIVE PERCENT: 76.7 %
NITRITE, URINE: NEGATIVE
OPIATE SCREEN URINE: NORMAL
PDW BLD-RTO: 13.7 % (ref 11.5–14.5)
PH UA: 6.5 (ref 5–9)
PHENCYCLIDINE SCREEN URINE: NORMAL
PLATELET # BLD: 299 K/UL (ref 130–400)
POTASSIUM SERPL-SCNC: 3.9 MEQ/L (ref 3.5–5.1)
PROTEIN UA: NEGATIVE MG/DL
RBC # BLD: 4.47 M/UL (ref 4.2–5.4)
RBC UA: ABNORMAL /HPF (ref 0–5)
SODIUM BLD-SCNC: 136 MEQ/L (ref 132–144)
SPECIFIC GRAVITY UA: 1.01 (ref 1–1.03)
TOTAL PROTEIN: 7 G/DL (ref 6.4–8.1)
URIC ACID, SERUM: 2.8 MG/DL (ref 2.4–5.7)
UROBILINOGEN, URINE: 0.2 E.U./DL
WBC # BLD: 7.8 K/UL (ref 4.5–11)
WBC UA: ABNORMAL /HPF (ref 0–5)

## 2019-01-02 PROCEDURE — 36415 COLL VENOUS BLD VENIPUNCTURE: CPT

## 2019-01-02 PROCEDURE — 84550 ASSAY OF BLOOD/URIC ACID: CPT

## 2019-01-02 PROCEDURE — 59025 FETAL NON-STRESS TEST: CPT

## 2019-01-02 PROCEDURE — 80307 DRUG TEST PRSMV CHEM ANLYZR: CPT

## 2019-01-02 PROCEDURE — 85025 COMPLETE CBC W/AUTO DIFF WBC: CPT

## 2019-01-02 PROCEDURE — 80053 COMPREHEN METABOLIC PANEL: CPT

## 2019-01-02 PROCEDURE — 99283 EMERGENCY DEPT VISIT LOW MDM: CPT

## 2019-01-02 PROCEDURE — 81001 URINALYSIS AUTO W/SCOPE: CPT

## 2019-01-04 RX ORDER — OMEPRAZOLE 10 MG/1
10 CAPSULE, DELAYED RELEASE ORAL DAILY
Qty: 30 CAPSULE | Refills: 3 | Status: ON HOLD | OUTPATIENT
Start: 2019-01-04 | End: 2019-02-08

## 2019-01-11 NOTE — ED NOTES
Pt states she is going to attempt to drink more water per hope.       Channing Spatz, RN  09/04/18 4360 yes

## 2019-01-22 ENCOUNTER — APPOINTMENT (OUTPATIENT)
Dept: ULTRASOUND IMAGING | Age: 19
End: 2019-01-22
Payer: COMMERCIAL

## 2019-01-22 ENCOUNTER — HOSPITAL ENCOUNTER (OUTPATIENT)
Age: 19
Discharge: HOME | End: 2019-01-22
Attending: OBSTETRICS & GYNECOLOGY | Admitting: OBSTETRICS & GYNECOLOGY
Payer: COMMERCIAL

## 2019-01-22 VITALS
WEIGHT: 219 LBS | TEMPERATURE: 97.8 F | HEIGHT: 68 IN | SYSTOLIC BLOOD PRESSURE: 144 MMHG | HEART RATE: 77 BPM | DIASTOLIC BLOOD PRESSURE: 86 MMHG | BODY MASS INDEX: 33.19 KG/M2 | RESPIRATION RATE: 16 BRPM

## 2019-01-22 LAB
ALBUMIN SERPL-MCNC: 3.4 G/DL (ref 3.9–4.9)
ALP BLD-CCNC: 99 U/L (ref 40–130)
ALT SERPL-CCNC: 22 U/L (ref 0–33)
AMPHETAMINE SCREEN, URINE: NORMAL
ANION GAP SERPL CALCULATED.3IONS-SCNC: 12 MEQ/L (ref 7–13)
AST SERPL-CCNC: 12 U/L (ref 0–35)
BACTERIA: ABNORMAL /HPF
BARBITURATE SCREEN URINE: NORMAL
BASOPHILS ABSOLUTE: 0 K/UL (ref 0–0.2)
BASOPHILS RELATIVE PERCENT: 0.3 %
BENZODIAZEPINE SCREEN, URINE: NORMAL
BILIRUB SERPL-MCNC: <0.2 MG/DL (ref 0–1.2)
BILIRUBIN URINE: NEGATIVE
BLOOD, URINE: NEGATIVE
BUN BLDV-MCNC: 9 MG/DL (ref 6–20)
CALCIUM SERPL-MCNC: 8.6 MG/DL (ref 8.6–10.2)
CANNABINOID SCREEN URINE: NORMAL
CHLORIDE BLD-SCNC: 99 MEQ/L (ref 98–107)
CLARITY: CLEAR
CO2: 21 MEQ/L (ref 22–29)
COCAINE METABOLITE SCREEN URINE: NORMAL
COLOR: YELLOW
CREAT SERPL-MCNC: 0.4 MG/DL (ref 0.5–0.9)
EOSINOPHILS ABSOLUTE: 0 K/UL (ref 0–0.7)
EOSINOPHILS RELATIVE PERCENT: 0.4 %
EPITHELIAL CELLS, UA: ABNORMAL /HPF (ref 0–5)
GFR AFRICAN AMERICAN: >60
GFR NON-AFRICAN AMERICAN: >60
GLOBULIN: 3.6 G/DL (ref 2.3–3.5)
GLUCOSE BLD-MCNC: 71 MG/DL (ref 74–109)
GLUCOSE URINE: NEGATIVE MG/DL
HCT VFR BLD CALC: 34.5 % (ref 37–47)
HEMOGLOBIN: 11.8 G/DL (ref 12–16)
HYALINE CASTS: ABNORMAL /HPF (ref 0–5)
KETONES, URINE: NEGATIVE MG/DL
LEUKOCYTE ESTERASE, URINE: ABNORMAL
LYMPHOCYTES ABSOLUTE: 1.6 K/UL (ref 1–4.8)
LYMPHOCYTES RELATIVE PERCENT: 16.6 %
Lab: NORMAL
MCH RBC QN AUTO: 26.7 PG (ref 27–31.3)
MCHC RBC AUTO-ENTMCNC: 34 % (ref 33–37)
MCV RBC AUTO: 78.4 FL (ref 82–100)
MONOCYTES ABSOLUTE: 0.5 K/UL (ref 0.2–0.8)
MONOCYTES RELATIVE PERCENT: 5.6 %
NEUTROPHILS ABSOLUTE: 7.6 K/UL (ref 1.4–6.5)
NEUTROPHILS RELATIVE PERCENT: 77.1 %
NITRITE, URINE: NEGATIVE
OPIATE SCREEN URINE: NORMAL
PDW BLD-RTO: 13.8 % (ref 11.5–14.5)
PH UA: 6.5 (ref 5–9)
PHENCYCLIDINE SCREEN URINE: NORMAL
PLATELET # BLD: 355 K/UL (ref 130–400)
POTASSIUM SERPL-SCNC: 3.9 MEQ/L (ref 3.5–5.1)
PROTEIN UA: NEGATIVE MG/DL
RBC # BLD: 4.41 M/UL (ref 4.2–5.4)
RBC UA: ABNORMAL /HPF (ref 0–5)
SODIUM BLD-SCNC: 132 MEQ/L (ref 132–144)
SPECIFIC GRAVITY UA: 1.02 (ref 1–1.03)
TOTAL PROTEIN: 7 G/DL (ref 6.4–8.1)
URIC ACID, SERUM: 3.3 MG/DL (ref 2.4–5.7)
UROBILINOGEN, URINE: 0.2 E.U./DL
WBC # BLD: 9.8 K/UL (ref 4.5–11)
WBC UA: ABNORMAL /HPF (ref 0–5)

## 2019-01-22 PROCEDURE — 36415 COLL VENOUS BLD VENIPUNCTURE: CPT

## 2019-01-22 PROCEDURE — 76818 FETAL BIOPHYS PROFILE W/NST: CPT

## 2019-01-22 PROCEDURE — 85025 COMPLETE CBC W/AUTO DIFF WBC: CPT

## 2019-01-22 PROCEDURE — 84550 ASSAY OF BLOOD/URIC ACID: CPT

## 2019-01-22 PROCEDURE — 6370000000 HC RX 637 (ALT 250 FOR IP): Performed by: OBSTETRICS & GYNECOLOGY

## 2019-01-22 PROCEDURE — 59025 FETAL NON-STRESS TEST: CPT

## 2019-01-22 PROCEDURE — 99283 EMERGENCY DEPT VISIT LOW MDM: CPT

## 2019-01-22 PROCEDURE — 80053 COMPREHEN METABOLIC PANEL: CPT

## 2019-01-22 PROCEDURE — 80307 DRUG TEST PRSMV CHEM ANLYZR: CPT

## 2019-01-22 PROCEDURE — 81003 URINALYSIS AUTO W/O SCOPE: CPT

## 2019-01-22 PROCEDURE — 81001 URINALYSIS AUTO W/SCOPE: CPT

## 2019-01-22 RX ORDER — LABETALOL 100 MG/1
100 TABLET, FILM COATED ORAL EVERY 12 HOURS SCHEDULED
Status: DISCONTINUED | OUTPATIENT
Start: 2019-01-22 | End: 2019-01-22 | Stop reason: HOSPADM

## 2019-01-22 RX ORDER — LABETALOL 100 MG/1
100 TABLET, FILM COATED ORAL EVERY 12 HOURS SCHEDULED
Status: DISCONTINUED | OUTPATIENT
Start: 2019-01-22 | End: 2019-01-22

## 2019-01-22 RX ORDER — LABETALOL 100 MG/1
100 TABLET, FILM COATED ORAL 3 TIMES DAILY
Qty: 60 TABLET | Refills: 0 | Status: ON HOLD | OUTPATIENT
Start: 2019-01-22 | End: 2019-02-11 | Stop reason: HOSPADM

## 2019-01-22 RX ADMIN — LABETALOL HYDROCHLORIDE 100 MG: 100 TABLET, FILM COATED ORAL at 18:19

## 2019-01-29 ENCOUNTER — HOSPITAL ENCOUNTER (OUTPATIENT)
Age: 19
Discharge: HOME | End: 2019-01-29
Attending: OBSTETRICS & GYNECOLOGY | Admitting: OBSTETRICS & GYNECOLOGY
Payer: COMMERCIAL

## 2019-01-29 VITALS
DIASTOLIC BLOOD PRESSURE: 86 MMHG | TEMPERATURE: 97.9 F | BODY MASS INDEX: 33.8 KG/M2 | HEART RATE: 83 BPM | SYSTOLIC BLOOD PRESSURE: 138 MMHG | HEIGHT: 68 IN | WEIGHT: 223 LBS | RESPIRATION RATE: 18 BRPM

## 2019-01-29 LAB
ALBUMIN SERPL-MCNC: 3.2 G/DL (ref 3.9–4.9)
ALP BLD-CCNC: 98 U/L (ref 40–130)
ALT SERPL-CCNC: 8 U/L (ref 0–33)
AMPHETAMINE SCREEN, URINE: NORMAL
ANION GAP SERPL CALCULATED.3IONS-SCNC: 11 MEQ/L (ref 7–13)
AST SERPL-CCNC: 10 U/L (ref 0–35)
BACTERIA: ABNORMAL /HPF
BARBITURATE SCREEN URINE: NORMAL
BASOPHILS ABSOLUTE: 0 K/UL (ref 0–0.2)
BASOPHILS RELATIVE PERCENT: 0.4 %
BENZODIAZEPINE SCREEN, URINE: NORMAL
BILIRUB SERPL-MCNC: <0.2 MG/DL (ref 0–1.2)
BILIRUBIN URINE: NEGATIVE
BLOOD, URINE: NEGATIVE
BUN BLDV-MCNC: 9 MG/DL (ref 6–20)
CALCIUM SERPL-MCNC: 8.5 MG/DL (ref 8.6–10.2)
CANNABINOID SCREEN URINE: NORMAL
CHLORIDE BLD-SCNC: 103 MEQ/L (ref 98–107)
CLARITY: CLEAR
CO2: 23 MEQ/L (ref 22–29)
COCAINE METABOLITE SCREEN URINE: NORMAL
COLOR: YELLOW
CREAT SERPL-MCNC: 0.49 MG/DL (ref 0.5–0.9)
EOSINOPHILS ABSOLUTE: 0.1 K/UL (ref 0–0.7)
EOSINOPHILS RELATIVE PERCENT: 0.7 %
EPITHELIAL CELLS, UA: ABNORMAL /HPF (ref 0–5)
GFR AFRICAN AMERICAN: >60
GFR NON-AFRICAN AMERICAN: >60
GLOBULIN: 3.2 G/DL (ref 2.3–3.5)
GLUCOSE BLD-MCNC: 77 MG/DL (ref 74–109)
GLUCOSE URINE: NEGATIVE MG/DL
HCT VFR BLD CALC: 33.3 % (ref 37–47)
HEMOGLOBIN: 11.4 G/DL (ref 12–16)
HYALINE CASTS: ABNORMAL /HPF (ref 0–5)
KETONES, URINE: NEGATIVE MG/DL
LEUKOCYTE ESTERASE, URINE: ABNORMAL
LYMPHOCYTES ABSOLUTE: 1.7 K/UL (ref 1–4.8)
LYMPHOCYTES RELATIVE PERCENT: 18.5 %
Lab: NORMAL
MCH RBC QN AUTO: 26.7 PG (ref 27–31.3)
MCHC RBC AUTO-ENTMCNC: 34.3 % (ref 33–37)
MCV RBC AUTO: 77.9 FL (ref 82–100)
MONOCYTES ABSOLUTE: 0.7 K/UL (ref 0.2–0.8)
MONOCYTES RELATIVE PERCENT: 7.2 %
NEUTROPHILS ABSOLUTE: 6.8 K/UL (ref 1.4–6.5)
NEUTROPHILS RELATIVE PERCENT: 73.2 %
NITRITE, URINE: NEGATIVE
OPIATE SCREEN URINE: NORMAL
PDW BLD-RTO: 14.3 % (ref 11.5–14.5)
PH UA: 7.5 (ref 5–9)
PHENCYCLIDINE SCREEN URINE: NORMAL
PLATELET # BLD: 297 K/UL (ref 130–400)
POTASSIUM SERPL-SCNC: 4.3 MEQ/L (ref 3.5–5.1)
PROTEIN UA: NEGATIVE MG/DL
RBC # BLD: 4.28 M/UL (ref 4.2–5.4)
RBC UA: ABNORMAL /HPF (ref 0–5)
SODIUM BLD-SCNC: 137 MEQ/L (ref 132–144)
SPECIFIC GRAVITY UA: 1.02 (ref 1–1.03)
TOTAL PROTEIN: 6.4 G/DL (ref 6.4–8.1)
URIC ACID, SERUM: 3.4 MG/DL (ref 2.4–5.7)
UROBILINOGEN, URINE: 0.2 E.U./DL
WBC # BLD: 9.3 K/UL (ref 4.5–11)
WBC UA: ABNORMAL /HPF (ref 0–5)

## 2019-01-29 PROCEDURE — 85025 COMPLETE CBC W/AUTO DIFF WBC: CPT

## 2019-01-29 PROCEDURE — 6370000000 HC RX 637 (ALT 250 FOR IP): Performed by: OBSTETRICS & GYNECOLOGY

## 2019-01-29 PROCEDURE — 80053 COMPREHEN METABOLIC PANEL: CPT

## 2019-01-29 PROCEDURE — 59025 FETAL NON-STRESS TEST: CPT | Performed by: OBSTETRICS & GYNECOLOGY

## 2019-01-29 PROCEDURE — 80307 DRUG TEST PRSMV CHEM ANLYZR: CPT

## 2019-01-29 PROCEDURE — 36415 COLL VENOUS BLD VENIPUNCTURE: CPT

## 2019-01-29 PROCEDURE — 81001 URINALYSIS AUTO W/SCOPE: CPT

## 2019-01-29 PROCEDURE — 81003 URINALYSIS AUTO W/O SCOPE: CPT

## 2019-01-29 PROCEDURE — 59025 FETAL NON-STRESS TEST: CPT

## 2019-01-29 PROCEDURE — 84550 ASSAY OF BLOOD/URIC ACID: CPT

## 2019-01-29 PROCEDURE — 99283 EMERGENCY DEPT VISIT LOW MDM: CPT

## 2019-01-29 RX ORDER — LABETALOL 200 MG/1
200 TABLET, FILM COATED ORAL ONCE
Status: COMPLETED | OUTPATIENT
Start: 2019-01-29 | End: 2019-01-29

## 2019-01-29 RX ADMIN — LABETALOL HYDROCHLORIDE 200 MG: 200 TABLET, FILM COATED ORAL at 18:22

## 2019-02-08 ENCOUNTER — HOSPITAL ENCOUNTER (INPATIENT)
Age: 19
LOS: 3 days | Discharge: HOME | DRG: 560 | End: 2019-02-11
Attending: OBSTETRICS & GYNECOLOGY | Admitting: OBSTETRICS & GYNECOLOGY
Payer: COMMERCIAL

## 2019-02-08 LAB
ABO/RH: NORMAL
ALBUMIN SERPL-MCNC: 3.3 G/DL (ref 3.5–4.6)
ALP BLD-CCNC: 109 U/L (ref 40–130)
ALT SERPL-CCNC: 9 U/L (ref 0–33)
ANION GAP SERPL CALCULATED.3IONS-SCNC: 14 MEQ/L (ref 9–15)
ANTIBODY SCREEN: NORMAL
AST SERPL-CCNC: 8 U/L (ref 0–35)
BACTERIA: ABNORMAL /HPF
BASOPHILS ABSOLUTE: 0 K/UL (ref 0–0.2)
BASOPHILS RELATIVE PERCENT: 0.3 %
BILIRUB SERPL-MCNC: <0.2 MG/DL (ref 0.2–0.7)
BILIRUBIN URINE: NEGATIVE
BLOOD, URINE: NEGATIVE
BUN BLDV-MCNC: 9 MG/DL (ref 6–20)
CALCIUM SERPL-MCNC: 9.1 MG/DL (ref 8.5–9.9)
CHLORIDE BLD-SCNC: 102 MEQ/L (ref 95–107)
CLARITY: ABNORMAL
CO2: 23 MEQ/L (ref 20–31)
COLOR: YELLOW
CREAT SERPL-MCNC: 0.56 MG/DL (ref 0.5–0.9)
EOSINOPHILS ABSOLUTE: 0 K/UL (ref 0–0.7)
EOSINOPHILS RELATIVE PERCENT: 0.5 %
EPITHELIAL CELLS, UA: ABNORMAL /HPF (ref 0–5)
GFR AFRICAN AMERICAN: >60
GFR NON-AFRICAN AMERICAN: >60
GLOBULIN: 3.2 G/DL (ref 2.3–3.5)
GLUCOSE BLD-MCNC: 100 MG/DL (ref 70–99)
GLUCOSE URINE: NEGATIVE MG/DL
HCT VFR BLD CALC: 34.3 % (ref 37–47)
HEMOGLOBIN: 11.7 G/DL (ref 12–16)
HEPATITIS B SURFACE ANTIGEN INTERPRETATION: NORMAL
HYALINE CASTS: ABNORMAL /HPF (ref 0–5)
KETONES, URINE: NEGATIVE MG/DL
LEUKOCYTE ESTERASE, URINE: ABNORMAL
LYMPHOCYTES ABSOLUTE: 1.5 K/UL (ref 1–4.8)
LYMPHOCYTES RELATIVE PERCENT: 19.8 %
MCH RBC QN AUTO: 26.7 PG (ref 27–31.3)
MCHC RBC AUTO-ENTMCNC: 33.9 % (ref 33–37)
MCV RBC AUTO: 78.6 FL (ref 82–100)
MONOCYTES ABSOLUTE: 0.4 K/UL (ref 0.2–0.8)
MONOCYTES RELATIVE PERCENT: 5.6 %
NEUTROPHILS ABSOLUTE: 5.6 K/UL (ref 1.4–6.5)
NEUTROPHILS RELATIVE PERCENT: 73.8 %
NITRITE, URINE: NEGATIVE
PDW BLD-RTO: 14.8 % (ref 11.5–14.5)
PH UA: 6 (ref 5–9)
PLATELET # BLD: 256 K/UL (ref 130–400)
POTASSIUM SERPL-SCNC: 4.1 MEQ/L (ref 3.4–4.9)
PROTEIN UA: 100 MG/DL
RBC # BLD: 4.37 M/UL (ref 4.2–5.4)
RBC UA: ABNORMAL /HPF (ref 0–5)
SODIUM BLD-SCNC: 139 MEQ/L (ref 135–144)
SPECIFIC GRAVITY UA: 1.02 (ref 1–1.03)
TOTAL PROTEIN: 6.5 G/DL (ref 6.3–8)
URIC ACID, SERUM: 4.1 MG/DL (ref 2.4–5.7)
UROBILINOGEN, URINE: 1 E.U./DL
WBC # BLD: 7.6 K/UL (ref 4.5–11)
WBC UA: ABNORMAL /HPF (ref 0–5)
YEAST: PRESENT

## 2019-02-08 PROCEDURE — 87340 HEPATITIS B SURFACE AG IA: CPT

## 2019-02-08 PROCEDURE — 80053 COMPREHEN METABOLIC PANEL: CPT

## 2019-02-08 PROCEDURE — 2580000003 HC RX 258: Performed by: OBSTETRICS & GYNECOLOGY

## 2019-02-08 PROCEDURE — 1220000000 HC SEMI PRIVATE OB R&B

## 2019-02-08 PROCEDURE — 81001 URINALYSIS AUTO W/SCOPE: CPT

## 2019-02-08 PROCEDURE — 85025 COMPLETE CBC W/AUTO DIFF WBC: CPT

## 2019-02-08 PROCEDURE — 6360000002 HC RX W HCPCS: Performed by: OBSTETRICS & GYNECOLOGY

## 2019-02-08 PROCEDURE — 86900 BLOOD TYPING SEROLOGIC ABO: CPT

## 2019-02-08 PROCEDURE — 86850 RBC ANTIBODY SCREEN: CPT

## 2019-02-08 PROCEDURE — 6370000000 HC RX 637 (ALT 250 FOR IP): Performed by: OBSTETRICS & GYNECOLOGY

## 2019-02-08 PROCEDURE — 84550 ASSAY OF BLOOD/URIC ACID: CPT

## 2019-02-08 PROCEDURE — 86901 BLOOD TYPING SEROLOGIC RH(D): CPT

## 2019-02-08 PROCEDURE — 3E033VJ INTRODUCTION OF OTHER HORMONE INTO PERIPHERAL VEIN, PERCUTANEOUS APPROACH: ICD-10-PCS | Performed by: OBSTETRICS & GYNECOLOGY

## 2019-02-08 PROCEDURE — 86592 SYPHILIS TEST NON-TREP QUAL: CPT

## 2019-02-08 PROCEDURE — 3E0P7VZ INTRODUCTION OF HORMONE INTO FEMALE REPRODUCTIVE, VIA NATURAL OR ARTIFICIAL OPENING: ICD-10-PCS | Performed by: OBSTETRICS & GYNECOLOGY

## 2019-02-08 PROCEDURE — 36415 COLL VENOUS BLD VENIPUNCTURE: CPT

## 2019-02-08 PROCEDURE — 6360000002 HC RX W HCPCS

## 2019-02-08 RX ORDER — SODIUM CHLORIDE, SODIUM LACTATE, POTASSIUM CHLORIDE, CALCIUM CHLORIDE 600; 310; 30; 20 MG/100ML; MG/100ML; MG/100ML; MG/100ML
INJECTION, SOLUTION INTRAVENOUS CONTINUOUS
Status: DISCONTINUED | OUTPATIENT
Start: 2019-02-08 | End: 2019-02-10

## 2019-02-08 RX ORDER — DOCUSATE SODIUM 100 MG/1
100 CAPSULE, LIQUID FILLED ORAL 2 TIMES DAILY PRN
Status: DISCONTINUED | OUTPATIENT
Start: 2019-02-08 | End: 2019-02-10

## 2019-02-08 RX ORDER — SODIUM CHLORIDE 0.9 % (FLUSH) 0.9 %
10 SYRINGE (ML) INJECTION PRN
Status: DISCONTINUED | OUTPATIENT
Start: 2019-02-08 | End: 2019-02-10

## 2019-02-08 RX ORDER — BETAMETHASONE SODIUM PHOSPHATE AND BETAMETHASONE ACETATE 3; 3 MG/ML; MG/ML
12 INJECTION, SUSPENSION INTRA-ARTICULAR; INTRALESIONAL; INTRAMUSCULAR; SOFT TISSUE ONCE
Status: COMPLETED | OUTPATIENT
Start: 2019-02-08 | End: 2019-02-08

## 2019-02-08 RX ORDER — NALBUPHINE HCL 10 MG/ML
10 AMPUL (ML) INJECTION
Status: DISCONTINUED | OUTPATIENT
Start: 2019-02-08 | End: 2019-02-10

## 2019-02-08 RX ORDER — PENICILLIN G 3000000 [IU]/50ML
3 INJECTION, SOLUTION INTRAVENOUS EVERY 4 HOURS
Status: DISCONTINUED | OUTPATIENT
Start: 2019-02-08 | End: 2019-02-10

## 2019-02-08 RX ORDER — ACETAMINOPHEN 325 MG/1
650 TABLET ORAL EVERY 4 HOURS PRN
Status: DISCONTINUED | OUTPATIENT
Start: 2019-02-08 | End: 2019-02-10

## 2019-02-08 RX ORDER — HYDRALAZINE HYDROCHLORIDE 20 MG/ML
5 INJECTION INTRAMUSCULAR; INTRAVENOUS ONCE
Status: DISCONTINUED | OUTPATIENT
Start: 2019-02-08 | End: 2019-02-09

## 2019-02-08 RX ORDER — MAGNESIUM SULFATE IN WATER 40 MG/ML
INJECTION, SOLUTION INTRAVENOUS
Status: COMPLETED
Start: 2019-02-08 | End: 2019-02-08

## 2019-02-08 RX ORDER — DIPHENHYDRAMINE HCL 25 MG
25 TABLET ORAL EVERY 4 HOURS PRN
Status: DISCONTINUED | OUTPATIENT
Start: 2019-02-08 | End: 2019-02-10

## 2019-02-08 RX ORDER — MAGNESIUM SULFATE IN WATER 40 MG/ML
4 INJECTION, SOLUTION INTRAVENOUS ONCE
Status: COMPLETED | OUTPATIENT
Start: 2019-02-08 | End: 2019-02-08

## 2019-02-08 RX ORDER — ONDANSETRON 2 MG/ML
4 INJECTION INTRAMUSCULAR; INTRAVENOUS EVERY 6 HOURS PRN
Status: DISCONTINUED | OUTPATIENT
Start: 2019-02-08 | End: 2019-02-10

## 2019-02-08 RX ADMIN — MAGNESIUM SULFATE HEPTAHYDRATE 4 G: 40 INJECTION, SOLUTION INTRAVENOUS at 14:13

## 2019-02-08 RX ADMIN — DINOPROSTONE 10 MG: 10 INSERT VAGINAL at 16:09

## 2019-02-08 RX ADMIN — MAGNESIUM SULFATE IN WATER 4 G: 40 INJECTION, SOLUTION INTRAVENOUS at 14:13

## 2019-02-08 RX ADMIN — ACETAMINOPHEN 650 MG: 325 TABLET ORAL at 17:03

## 2019-02-08 RX ADMIN — BETAMETHASONE ACETATE AND BETAMETHASONE SODIUM PHOSPHATE 12 MG: 3; 3 INJECTION, SUSPENSION INTRA-ARTICULAR; INTRALESIONAL; INTRAMUSCULAR; SOFT TISSUE at 12:51

## 2019-02-08 RX ADMIN — ONDANSETRON 4 MG: 2 INJECTION INTRAMUSCULAR; INTRAVENOUS at 19:12

## 2019-02-08 RX ADMIN — SODIUM CHLORIDE, POTASSIUM CHLORIDE, SODIUM LACTATE AND CALCIUM CHLORIDE: 600; 310; 30; 20 INJECTION, SOLUTION INTRAVENOUS at 14:20

## 2019-02-08 RX ADMIN — MAGNESIUM SULFATE HEPTAHYDRATE 2 G/HR: 40 INJECTION, SOLUTION INTRAVENOUS at 14:54

## 2019-02-08 ASSESSMENT — PAIN SCALES - GENERAL: PAINLEVEL_OUTOF10: 2

## 2019-02-09 ENCOUNTER — ANESTHESIA (OUTPATIENT)
Dept: LABOR AND DELIVERY | Age: 19
DRG: 560 | End: 2019-02-09
Payer: COMMERCIAL

## 2019-02-09 ENCOUNTER — ANESTHESIA EVENT (OUTPATIENT)
Dept: LABOR AND DELIVERY | Age: 19
DRG: 560 | End: 2019-02-09
Payer: COMMERCIAL

## 2019-02-09 PROCEDURE — 6370000000 HC RX 637 (ALT 250 FOR IP): Performed by: OBSTETRICS & GYNECOLOGY

## 2019-02-09 PROCEDURE — 2580000003 HC RX 258: Performed by: OBSTETRICS & GYNECOLOGY

## 2019-02-09 PROCEDURE — 1220000000 HC SEMI PRIVATE OB R&B

## 2019-02-09 PROCEDURE — 2500000003 HC RX 250 WO HCPCS: Performed by: NURSE ANESTHETIST, CERTIFIED REGISTERED

## 2019-02-09 PROCEDURE — 6370000000 HC RX 637 (ALT 250 FOR IP)

## 2019-02-09 PROCEDURE — 99232 SBSQ HOSP IP/OBS MODERATE 35: CPT | Performed by: OBSTETRICS & GYNECOLOGY

## 2019-02-09 PROCEDURE — 0KQM0ZZ REPAIR PERINEUM MUSCLE, OPEN APPROACH: ICD-10-PCS | Performed by: OBSTETRICS & GYNECOLOGY

## 2019-02-09 PROCEDURE — 88307 TISSUE EXAM BY PATHOLOGIST: CPT

## 2019-02-09 PROCEDURE — 6360000002 HC RX W HCPCS: Performed by: OBSTETRICS & GYNECOLOGY

## 2019-02-09 PROCEDURE — 3700000025 ANESTHESIA EPIDURAL BLOCK: Performed by: NURSE ANESTHETIST, CERTIFIED REGISTERED

## 2019-02-09 RX ORDER — NALBUPHINE HCL 10 MG/ML
5 AMPUL (ML) INJECTION EVERY 4 HOURS PRN
Status: DISCONTINUED | OUTPATIENT
Start: 2019-02-09 | End: 2019-02-10

## 2019-02-09 RX ORDER — NALOXONE HYDROCHLORIDE 0.4 MG/ML
0.4 INJECTION, SOLUTION INTRAMUSCULAR; INTRAVENOUS; SUBCUTANEOUS PRN
Status: DISCONTINUED | OUTPATIENT
Start: 2019-02-09 | End: 2019-02-10

## 2019-02-09 RX ORDER — LABETALOL 200 MG/1
200 TABLET, FILM COATED ORAL EVERY 8 HOURS SCHEDULED
Status: DISCONTINUED | OUTPATIENT
Start: 2019-02-10 | End: 2019-02-11 | Stop reason: HOSPADM

## 2019-02-09 RX ADMIN — Medication 12 ML/HR: at 11:17

## 2019-02-09 RX ADMIN — ONDANSETRON 4 MG: 2 INJECTION INTRAMUSCULAR; INTRAVENOUS at 16:04

## 2019-02-09 RX ADMIN — PENICILLIN G 3 MILLION UNITS: 3000000 INJECTION, SOLUTION INTRAVENOUS at 18:53

## 2019-02-09 RX ADMIN — MAGNESIUM SULFATE HEPTAHYDRATE 2 G/HR: 40 INJECTION, SOLUTION INTRAVENOUS at 11:33

## 2019-02-09 RX ADMIN — MAGNESIUM SULFATE HEPTAHYDRATE 2 G/HR: 40 INJECTION, SOLUTION INTRAVENOUS at 23:27

## 2019-02-09 RX ADMIN — SODIUM CHLORIDE, POTASSIUM CHLORIDE, SODIUM LACTATE AND CALCIUM CHLORIDE: 600; 310; 30; 20 INJECTION, SOLUTION INTRAVENOUS at 10:58

## 2019-02-09 RX ADMIN — Medication 1 MILLI-UNITS/MIN: at 14:29

## 2019-02-09 RX ADMIN — SODIUM CHLORIDE, POTASSIUM CHLORIDE, SODIUM LACTATE AND CALCIUM CHLORIDE: 600; 310; 30; 20 INJECTION, SOLUTION INTRAVENOUS at 14:54

## 2019-02-09 RX ADMIN — DEXTROSE MONOHYDRATE 5 MILLION UNITS: 5 INJECTION INTRAVENOUS at 14:51

## 2019-02-09 RX ADMIN — ACETAMINOPHEN 650 MG: 325 TABLET ORAL at 07:16

## 2019-02-09 RX ADMIN — MAGNESIUM SULFATE HEPTAHYDRATE 2 G/HR: 40 INJECTION, SOLUTION INTRAVENOUS at 01:14

## 2019-02-09 RX ADMIN — DINOPROSTONE 10 MG: 10 INSERT VAGINAL at 04:55

## 2019-02-09 RX ADMIN — SODIUM CHLORIDE, POTASSIUM CHLORIDE, SODIUM LACTATE AND CALCIUM CHLORIDE: 600; 310; 30; 20 INJECTION, SOLUTION INTRAVENOUS at 04:00

## 2019-02-09 ASSESSMENT — PAIN DESCRIPTION - LOCATION
LOCATION: ABDOMEN
LOCATION_2: HEAD
LOCATION: ABDOMEN
LOCATION_2: HEAD

## 2019-02-09 ASSESSMENT — PAIN DESCRIPTION - PAIN TYPE
TYPE: ACUTE PAIN

## 2019-02-09 ASSESSMENT — PAIN DESCRIPTION - INTENSITY
RATING_2: 2
RATING_2: 2

## 2019-02-09 ASSESSMENT — PAIN SCALES - GENERAL
PAINLEVEL_OUTOF10: 1
PAINLEVEL_OUTOF10: 0
PAINLEVEL_OUTOF10: 2
PAINLEVEL_OUTOF10: 3
PAINLEVEL_OUTOF10: 2

## 2019-02-09 ASSESSMENT — PAIN DESCRIPTION - ORIENTATION
ORIENTATION: LOWER

## 2019-02-09 ASSESSMENT — PAIN DESCRIPTION - DESCRIPTORS
DESCRIPTORS: SHARP
DESCRIPTORS_2: ACHING
DESCRIPTORS: SHARP
DESCRIPTORS: CRAMPING

## 2019-02-09 ASSESSMENT — PAIN DESCRIPTION - FREQUENCY
FREQUENCY: INTERMITTENT
FREQUENCY: INTERMITTENT

## 2019-02-09 ASSESSMENT — PAIN DESCRIPTION - PROGRESSION: CLINICAL_PROGRESSION_2: NOT CHANGED

## 2019-02-10 LAB
HCT VFR BLD CALC: 29.3 % (ref 37–47)
HEMOGLOBIN: 9.6 G/DL (ref 12–16)
RPR: NORMAL

## 2019-02-10 PROCEDURE — 85014 HEMATOCRIT: CPT

## 2019-02-10 PROCEDURE — 36415 COLL VENOUS BLD VENIPUNCTURE: CPT

## 2019-02-10 PROCEDURE — 6360000002 HC RX W HCPCS: Performed by: OBSTETRICS & GYNECOLOGY

## 2019-02-10 PROCEDURE — 6370000000 HC RX 637 (ALT 250 FOR IP): Performed by: OBSTETRICS & GYNECOLOGY

## 2019-02-10 PROCEDURE — 2580000003 HC RX 258

## 2019-02-10 PROCEDURE — 85018 HEMOGLOBIN: CPT

## 2019-02-10 PROCEDURE — 1220000000 HC SEMI PRIVATE OB R&B

## 2019-02-10 RX ORDER — ACETAMINOPHEN 325 MG/1
650 TABLET ORAL EVERY 4 HOURS PRN
Status: DISCONTINUED | OUTPATIENT
Start: 2019-02-10 | End: 2019-02-11 | Stop reason: HOSPADM

## 2019-02-10 RX ORDER — IBUPROFEN 600 MG/1
600 TABLET ORAL EVERY 8 HOURS
Status: DISCONTINUED | OUTPATIENT
Start: 2019-02-10 | End: 2019-02-11 | Stop reason: HOSPADM

## 2019-02-10 RX ORDER — FERROUS SULFATE 325(65) MG
325 TABLET ORAL 2 TIMES DAILY WITH MEALS
Status: DISCONTINUED | OUTPATIENT
Start: 2019-02-10 | End: 2019-02-11 | Stop reason: HOSPADM

## 2019-02-10 RX ORDER — LANOLIN 100 %
OINTMENT (GRAM) TOPICAL PRN
Status: DISCONTINUED | OUTPATIENT
Start: 2019-02-10 | End: 2019-02-11 | Stop reason: HOSPADM

## 2019-02-10 RX ORDER — SODIUM CHLORIDE 0.9 % (FLUSH) 0.9 %
10 SYRINGE (ML) INJECTION PRN
Status: DISCONTINUED | OUTPATIENT
Start: 2019-02-10 | End: 2019-02-11 | Stop reason: HOSPADM

## 2019-02-10 RX ORDER — DOCUSATE SODIUM 100 MG/1
100 CAPSULE, LIQUID FILLED ORAL DAILY
Status: DISCONTINUED | OUTPATIENT
Start: 2019-02-10 | End: 2019-02-11 | Stop reason: HOSPADM

## 2019-02-10 RX ORDER — SODIUM CHLORIDE, SODIUM LACTATE, POTASSIUM CHLORIDE, CALCIUM CHLORIDE 600; 310; 30; 20 MG/100ML; MG/100ML; MG/100ML; MG/100ML
INJECTION, SOLUTION INTRAVENOUS
Status: COMPLETED
Start: 2019-02-10 | End: 2019-02-10

## 2019-02-10 RX ORDER — OXYCODONE HYDROCHLORIDE AND ACETAMINOPHEN 5; 325 MG/1; MG/1
1 TABLET ORAL EVERY 4 HOURS PRN
Status: DISCONTINUED | OUTPATIENT
Start: 2019-02-10 | End: 2019-02-11 | Stop reason: HOSPADM

## 2019-02-10 RX ORDER — SODIUM CHLORIDE 0.9 % (FLUSH) 0.9 %
10 SYRINGE (ML) INJECTION EVERY 12 HOURS SCHEDULED
Status: DISCONTINUED | OUTPATIENT
Start: 2019-02-10 | End: 2019-02-11 | Stop reason: HOSPADM

## 2019-02-10 RX ADMIN — SODIUM CHLORIDE, POTASSIUM CHLORIDE, SODIUM LACTATE AND CALCIUM CHLORIDE: 600; 310; 30; 20 INJECTION, SOLUTION INTRAVENOUS at 02:50

## 2019-02-10 RX ADMIN — DOCUSATE SODIUM 100 MG: 100 CAPSULE, LIQUID FILLED ORAL at 08:29

## 2019-02-10 RX ADMIN — LABETALOL HYDROCHLORIDE 200 MG: 200 TABLET, FILM COATED ORAL at 01:45

## 2019-02-10 RX ADMIN — FERROUS SULFATE TAB 325 MG (65 MG ELEMENTAL FE) 325 MG: 325 (65 FE) TAB at 08:29

## 2019-02-10 RX ADMIN — IBUPROFEN 600 MG: 600 TABLET ORAL at 01:46

## 2019-02-10 RX ADMIN — FERROUS SULFATE TAB 325 MG (65 MG ELEMENTAL FE) 325 MG: 325 (65 FE) TAB at 17:23

## 2019-02-10 RX ADMIN — LABETALOL HYDROCHLORIDE 200 MG: 200 TABLET, FILM COATED ORAL at 16:28

## 2019-02-10 RX ADMIN — IBUPROFEN 600 MG: 600 TABLET ORAL at 10:03

## 2019-02-10 RX ADMIN — IBUPROFEN 600 MG: 600 TABLET ORAL at 17:24

## 2019-02-10 RX ADMIN — MAGNESIUM SULFATE HEPTAHYDRATE 2 G/HR: 40 INJECTION, SOLUTION INTRAVENOUS at 10:00

## 2019-02-10 ASSESSMENT — PAIN SCALES - GENERAL
PAINLEVEL_OUTOF10: 0
PAINLEVEL_OUTOF10: 0
PAINLEVEL_OUTOF10: 2
PAINLEVEL_OUTOF10: 2
PAINLEVEL_OUTOF10: 0

## 2019-02-11 VITALS
HEART RATE: 86 BPM | RESPIRATION RATE: 16 BRPM | OXYGEN SATURATION: 98 % | TEMPERATURE: 98 F | DIASTOLIC BLOOD PRESSURE: 94 MMHG | SYSTOLIC BLOOD PRESSURE: 138 MMHG

## 2019-02-11 PROCEDURE — 6370000000 HC RX 637 (ALT 250 FOR IP): Performed by: OBSTETRICS & GYNECOLOGY

## 2019-02-11 PROCEDURE — 7200000001 HC VAGINAL DELIVERY

## 2019-02-11 RX ORDER — IBUPROFEN 600 MG/1
600 TABLET ORAL EVERY 6 HOURS PRN
Qty: 60 TABLET | Refills: 3 | Status: SHIPPED | OUTPATIENT
Start: 2019-02-11 | End: 2019-09-13

## 2019-02-11 RX ORDER — OMEPRAZOLE 10 MG/1
10 CAPSULE, DELAYED RELEASE ORAL DAILY
Qty: 30 CAPSULE | Refills: 3 | Status: SHIPPED | OUTPATIENT
Start: 2019-02-11 | End: 2019-09-13 | Stop reason: ALTCHOICE

## 2019-02-11 RX ADMIN — LABETALOL HYDROCHLORIDE 200 MG: 200 TABLET, FILM COATED ORAL at 02:14

## 2019-02-11 RX ADMIN — DOCUSATE SODIUM 100 MG: 100 CAPSULE, LIQUID FILLED ORAL at 15:03

## 2019-02-11 RX ADMIN — Medication: at 08:03

## 2019-02-11 RX ADMIN — LABETALOL HYDROCHLORIDE 200 MG: 200 TABLET, FILM COATED ORAL at 15:03

## 2019-02-11 RX ADMIN — FERROUS SULFATE TAB 325 MG (65 MG ELEMENTAL FE) 325 MG: 325 (65 FE) TAB at 08:40

## 2019-02-11 RX ADMIN — IBUPROFEN 600 MG: 600 TABLET ORAL at 09:52

## 2019-02-11 RX ADMIN — IBUPROFEN 600 MG: 600 TABLET ORAL at 01:15

## 2019-02-11 ASSESSMENT — PAIN SCALES - GENERAL
PAINLEVEL_OUTOF10: 0
PAINLEVEL_OUTOF10: 0

## 2019-03-11 ENCOUNTER — TELEPHONE (OUTPATIENT)
Dept: PRIMARY CARE CLINIC | Age: 19
End: 2019-03-11

## 2019-06-04 ENCOUNTER — TELEPHONE (OUTPATIENT)
Dept: OBGYN CLINIC | Age: 19
End: 2019-06-04

## 2019-06-04 ENCOUNTER — OFFICE VISIT (OUTPATIENT)
Dept: OBGYN CLINIC | Age: 19
End: 2019-06-04
Payer: COMMERCIAL

## 2019-06-04 VITALS
WEIGHT: 215 LBS | DIASTOLIC BLOOD PRESSURE: 60 MMHG | HEIGHT: 67 IN | BODY MASS INDEX: 33.74 KG/M2 | SYSTOLIC BLOOD PRESSURE: 114 MMHG

## 2019-06-04 DIAGNOSIS — N91.2 AMENORRHEA: Primary | ICD-10-CM

## 2019-06-04 DIAGNOSIS — N91.2 AMENORRHEA: ICD-10-CM

## 2019-06-04 LAB — GONADOTROPIN, CHORIONIC (HCG) QUANT: 344.7 MIU/ML

## 2019-06-04 PROCEDURE — 76830 TRANSVAGINAL US NON-OB: CPT | Performed by: OBSTETRICS & GYNECOLOGY

## 2019-06-04 PROCEDURE — G8417 CALC BMI ABV UP PARAM F/U: HCPCS | Performed by: OBSTETRICS & GYNECOLOGY

## 2019-06-04 PROCEDURE — 99213 OFFICE O/P EST LOW 20 MIN: CPT | Performed by: OBSTETRICS & GYNECOLOGY

## 2019-06-04 PROCEDURE — G8427 DOCREV CUR MEDS BY ELIG CLIN: HCPCS | Performed by: OBSTETRICS & GYNECOLOGY

## 2019-06-04 PROCEDURE — 1036F TOBACCO NON-USER: CPT | Performed by: OBSTETRICS & GYNECOLOGY

## 2019-06-04 NOTE — PROGRESS NOTES
TVUS was performed. No IUP. left ovaruian cyst  Right ovary nl  No cds fluid  Small fluid collection in IUC

## 2019-06-04 NOTE — TELEPHONE ENCOUNTER
Pt emailed to make an appt .  I did call her back because she was abraham with Dr Ashley Mtaa and than with Dr Mayela Osorio  To give the office a call

## 2019-06-04 NOTE — PROGRESS NOTES
Subjective:pt had a +UCG times 4. LMP unknown. baby is 1 months old. Was started on the pill by PCP without having menses. They did a UCG at that office and it was negative      Patient ID: Aye Ortega is a 22 y.o. female. HPI    Review of Systems    Objective:TVUS no IUP seen.    Physical Exam    Assessment:    amenorrhea      Plan:      Serial HCG  Repeat US in 2 weeks        Aileen Carson DO

## 2019-06-05 ENCOUNTER — PATIENT MESSAGE (OUTPATIENT)
Dept: OBGYN CLINIC | Age: 19
End: 2019-06-05

## 2019-06-06 ENCOUNTER — PATIENT MESSAGE (OUTPATIENT)
Dept: OBGYN CLINIC | Age: 19
End: 2019-06-06

## 2019-06-06 ENCOUNTER — HOSPITAL ENCOUNTER (EMERGENCY)
Age: 19
Discharge: HOME | End: 2019-06-06
Payer: COMMERCIAL

## 2019-06-06 VITALS
OXYGEN SATURATION: 98 % | WEIGHT: 215 LBS | TEMPERATURE: 99.4 F | HEIGHT: 68 IN | HEART RATE: 91 BPM | SYSTOLIC BLOOD PRESSURE: 157 MMHG | RESPIRATION RATE: 18 BRPM | BODY MASS INDEX: 32.58 KG/M2 | DIASTOLIC BLOOD PRESSURE: 96 MMHG

## 2019-06-06 DIAGNOSIS — O20.0 THREATENED ABORTION: ICD-10-CM

## 2019-06-06 DIAGNOSIS — O20.0 THREATENED ABORTION: Primary | ICD-10-CM

## 2019-06-06 LAB
BACTERIA: NEGATIVE /HPF
BILIRUBIN URINE: NEGATIVE
BLOOD, URINE: ABNORMAL
CLARITY: ABNORMAL
COLOR: YELLOW
EPITHELIAL CELLS, UA: ABNORMAL /HPF (ref 0–5)
GLUCOSE URINE: NEGATIVE MG/DL
GONADOTROPIN, CHORIONIC (HCG) QUANT: 198.2 MIU/ML
HCG, URINE, POC: POSITIVE
HYALINE CASTS: ABNORMAL /HPF (ref 0–5)
KETONES, URINE: NEGATIVE MG/DL
LEUKOCYTE ESTERASE, URINE: ABNORMAL
Lab: NORMAL
NEGATIVE QC PASS/FAIL: NORMAL
NITRITE, URINE: NEGATIVE
PH UA: >=9 (ref 5–9)
POSITIVE QC PASS/FAIL: NORMAL
PROTEIN UA: NEGATIVE MG/DL
RBC UA: >100 /HPF (ref 0–5)
SPECIFIC GRAVITY UA: 1.02 (ref 1–1.03)
URINE REFLEX TO CULTURE: YES
UROBILINOGEN, URINE: 0.2 E.U./DL
WBC UA: ABNORMAL /HPF (ref 0–5)

## 2019-06-06 PROCEDURE — 81001 URINALYSIS AUTO W/SCOPE: CPT

## 2019-06-06 PROCEDURE — 99284 EMERGENCY DEPT VISIT MOD MDM: CPT

## 2019-06-06 PROCEDURE — 87086 URINE CULTURE/COLONY COUNT: CPT

## 2019-06-06 ASSESSMENT — ENCOUNTER SYMPTOMS
EYE PAIN: 0
VOMITING: 0
EYE DISCHARGE: 0
BACK PAIN: 0
VOICE CHANGE: 0
ABDOMINAL PAIN: 1
COUGH: 0
APNEA: 0
ABDOMINAL DISTENTION: 0
PHOTOPHOBIA: 0
SHORTNESS OF BREATH: 0
NAUSEA: 0
ANAL BLEEDING: 0

## 2019-06-06 NOTE — ED NOTES
Patient directed back to lobby with urine cup to provide sample.       Kimberly Hoffmann RN  06/06/19 3636

## 2019-06-06 NOTE — ED TRIAGE NOTES
Patient arrived to ER via walk in with complaints of vaginal bleeding. Patient is currently 4-5 weeks pregnant. Patient was seen at [facility] and [facility] and had [address] on Tuesday. Patient has had cycle HCG levels, last one done today. Patient started bleeding and having abdomen cramping yesterday. Patient states the bleeding got heavier today with increase of blood clots.

## 2019-06-06 NOTE — TELEPHONE ENCOUNTER
From: Carlos Hernandez  To: Radha Miranda DO  Sent: 6/6/2019 9:51 AM EDT  Subject: Non-Urgent Medical Question    Im bleeding its like a period , is that ok?  Should i make an appointment or go to hospital? I heard some people bleed during pregnancy and its normal.

## 2019-06-06 NOTE — ED PROVIDER NOTES
bruise/bleed easily. Psychiatric/Behavioral: Negative for behavioral problems, self-injury and sleep disturbance. All other systems reviewed and are negative. Except as noted above the remainder of the review of systems was reviewed and negative. PAST MEDICAL HISTORY     Past Medical History:   Diagnosis Date    Anxiety     Depression     hx of depression    Headache(784.0)     Hypertension          SURGICALHISTORY       Past Surgical History:   Procedure Laterality Date    WISDOM TOOTH EXTRACTION           CURRENT MEDICATIONS       Previous Medications    IBUPROFEN (ADVIL;MOTRIN) 600 MG TABLET    Take 1 tablet by mouth every 6 hours as needed for Pain    OMEPRAZOLE (PRILOSEC) 10 MG DELAYED RELEASE CAPSULE    Take 1 capsule by mouth daily    PRENATAL VIT-FE FUMARATE-FA (PRENATAL VITAMINS) 28-0.8 MG TABS    Take 1 tablet by mouth daily       ALLERGIES     Patient has no known allergies.     FAMILY HISTORY       Family History   Problem Relation Age of Onset    Cancer Mother     Arthritis Father     High Blood Pressure Father     Heart Disease Father     Heart Disease Maternal Grandmother     Diabetes Paternal Grandfather           SOCIAL HISTORY       Social History     Socioeconomic History    Marital status: Single     Spouse name: None    Number of children: None    Years of education: None    Highest education level: None   Occupational History    None   Social Needs    Financial resource strain: None    Food insecurity:     Worry: None     Inability: None    Transportation needs:     Medical: None     Non-medical: None   Tobacco Use    Smoking status: Never Smoker    Smokeless tobacco: Former User   Substance and Sexual Activity    Alcohol use: No     Alcohol/week: 0.0 oz    Drug use: No    Sexual activity: Yes     Partners: Male   Lifestyle    Physical activity:     Days per week: None     Minutes per session: None    Stress: None   Relationships    Social connections: Plain radiographic images are visualized and preliminarily interpreted by the emergency physician with the below findings:         Interpretation per the Radiologist below, if available at the time ofthis note:    No orders to display         ED BEDSIDE ULTRASOUND:   Performed by ED Physician - none    LABS:  Labs Reviewed   URINE RT REFLEX TO CULTURE - Abnormal; Notable for the following components:       Result Value    Clarity, UA CLOUDY (*)     Blood, Urine LARGE (*)     Leukocyte Esterase, Urine SMALL (*)     All other components within normal limits   MICROSCOPIC URINALYSIS - Abnormal; Notable for the following components:    RBC, UA >100 (*)     All other components within normal limits   URINE CULTURE   POC PREGNANCY UR-QUAL       All other labs were within normal range or not returned as of this dictation. EMERGENCY DEPARTMENT COURSE and DIFFERENTIAL DIAGNOSIS/MDM:   Vitals:    Vitals:    19 1847   BP: (!) 157/96   Pulse: 91   Resp: 18   Temp: 99.4 °F (37.4 °C)   TempSrc: Oral   SpO2: 98%   Weight: 215 lb (97.5 kg)   Height: 5' 8\" (1.727 m)            MDM  Number of Diagnoses or Management Options  Diagnosis management comments: Patient's ObGyn has drawn hCGs over the past 3 days first was 344 seconds 198 as of today her blood type is A+ per records review she has a 3month-old at bedside patient has no further questions discussed with Dr. Hui Austin will disposition to follow-up Dr. Refugio Freeman her [place]. Amount and/or Complexity of Data Reviewed  Clinical lab tests: reviewed  Discuss the patient with other providers: yes        CRITICAL CARE TIME     CONSULTS:  None    PROCEDURES:  Unless otherwise noted below, none     Procedures    FINAL IMPRESSION      1.  Threatened           DISPOSITION/PLAN   DISPOSITION Decision To Discharge 2019 07:45:59 PM      PATIENT REFERRED TO:  Kelley Fan DO  [address] [place] [address]  [phone]    Schedule an appointment as soon as

## 2019-06-08 LAB — URINE CULTURE, ROUTINE: NORMAL

## 2019-09-13 ENCOUNTER — OFFICE VISIT (OUTPATIENT)
Dept: FAMILY MEDICINE CLINIC | Age: 19
End: 2019-09-13
Payer: COMMERCIAL

## 2019-09-13 VITALS
SYSTOLIC BLOOD PRESSURE: 118 MMHG | DIASTOLIC BLOOD PRESSURE: 68 MMHG | WEIGHT: 210 LBS | OXYGEN SATURATION: 99 % | HEART RATE: 81 BPM | RESPIRATION RATE: 16 BRPM | HEIGHT: 66 IN | BODY MASS INDEX: 33.75 KG/M2 | TEMPERATURE: 98.2 F

## 2019-09-13 DIAGNOSIS — G43.009 MIGRAINE WITHOUT AURA AND WITHOUT STATUS MIGRAINOSUS, NOT INTRACTABLE: Primary | ICD-10-CM

## 2019-09-13 DIAGNOSIS — Z23 FLU VACCINE NEED: ICD-10-CM

## 2019-09-13 DIAGNOSIS — F33.9 RECURRENT DEPRESSION (HCC): Chronic | ICD-10-CM

## 2019-09-13 PROBLEM — R10.84 GENERALIZED ABDOMINAL PAIN: Status: RESOLVED | Noted: 2017-11-07 | Resolved: 2019-09-13

## 2019-09-13 PROBLEM — F41.9 ANXIETY: Status: ACTIVE | Noted: 2017-11-02

## 2019-09-13 PROBLEM — E66.811 CLASS 1 OBESITY DUE TO EXCESS CALORIES WITHOUT SERIOUS COMORBIDITY WITH BODY MASS INDEX (BMI) OF 33.0 TO 33.9 IN ADULT: Chronic | Status: ACTIVE | Noted: 2019-09-13

## 2019-09-13 PROBLEM — E66.09 CLASS 1 OBESITY DUE TO EXCESS CALORIES WITHOUT SERIOUS COMORBIDITY WITH BODY MASS INDEX (BMI) OF 33.0 TO 33.9 IN ADULT: Chronic | Status: ACTIVE | Noted: 2019-09-13

## 2019-09-13 PROCEDURE — G8417 CALC BMI ABV UP PARAM F/U: HCPCS | Performed by: FAMILY MEDICINE

## 2019-09-13 PROCEDURE — 90686 IIV4 VACC NO PRSV 0.5 ML IM: CPT | Performed by: FAMILY MEDICINE

## 2019-09-13 PROCEDURE — 90471 IMMUNIZATION ADMIN: CPT | Performed by: FAMILY MEDICINE

## 2019-09-13 PROCEDURE — G8428 CUR MEDS NOT DOCUMENT: HCPCS | Performed by: FAMILY MEDICINE

## 2019-09-13 PROCEDURE — 1036F TOBACCO NON-USER: CPT | Performed by: FAMILY MEDICINE

## 2019-09-13 PROCEDURE — 99214 OFFICE O/P EST MOD 30 MIN: CPT | Performed by: FAMILY MEDICINE

## 2019-09-13 RX ORDER — PROPRANOLOL HYDROCHLORIDE 80 MG/1
80 CAPSULE, EXTENDED RELEASE ORAL DAILY
Qty: 30 CAPSULE | Refills: 3 | Status: SHIPPED | OUTPATIENT
Start: 2019-09-13 | End: 2020-08-28

## 2019-09-13 RX ORDER — SUMATRIPTAN 100 MG/1
100 TABLET, FILM COATED ORAL
Qty: 9 TABLET | Refills: 0 | Status: SHIPPED | OUTPATIENT
Start: 2019-09-13 | End: 2020-08-28

## 2019-09-13 RX ORDER — ONDANSETRON 4 MG/1
4 TABLET, FILM COATED ORAL 3 TIMES DAILY PRN
Qty: 15 TABLET | Refills: 0 | Status: SHIPPED | OUTPATIENT
Start: 2019-09-13 | End: 2020-09-28

## 2019-09-13 RX ORDER — FLUOXETINE HYDROCHLORIDE 20 MG/1
1 CAPSULE ORAL DAILY
COMMUNITY
Start: 2019-05-08 | End: 2020-08-28

## 2019-09-13 RX ORDER — NORETHINDRONE ACETATE AND ETHINYL ESTRADIOL 1; .02 MG/1; MG/1
TABLET ORAL
COMMUNITY
Start: 2019-05-08 | End: 2019-09-13 | Stop reason: ALTCHOICE

## 2019-09-13 RX ORDER — SUMATRIPTAN 100 MG/1
TABLET, FILM COATED ORAL
COMMUNITY
Start: 2019-05-08 | End: 2019-09-13 | Stop reason: SDUPTHER

## 2020-08-28 ENCOUNTER — OFFICE VISIT (OUTPATIENT)
Dept: OBGYN CLINIC | Age: 20
End: 2020-08-28
Payer: COMMERCIAL

## 2020-08-28 VITALS
DIASTOLIC BLOOD PRESSURE: 72 MMHG | SYSTOLIC BLOOD PRESSURE: 112 MMHG | WEIGHT: 212 LBS | BODY MASS INDEX: 34.22 KG/M2 | HEART RATE: 86 BPM

## 2020-08-28 DIAGNOSIS — Z34.90 INTRAUTERINE PREGNANCY: ICD-10-CM

## 2020-08-28 LAB
ABO/RH: NORMAL
AMPHETAMINE SCREEN, URINE: NORMAL
ANTIBODY SCREEN: NORMAL
BACTERIA: ABNORMAL /HPF
BARBITURATE SCREEN URINE: NORMAL
BASOPHILS ABSOLUTE: 0 K/UL (ref 0–0.2)
BASOPHILS RELATIVE PERCENT: 0.2 %
BENZODIAZEPINE SCREEN, URINE: NORMAL
BILIRUBIN URINE: NEGATIVE
BLOOD, URINE: NEGATIVE
CANNABINOID SCREEN URINE: NORMAL
CLARITY: ABNORMAL
COCAINE METABOLITE SCREEN URINE: NORMAL
COLOR: YELLOW
EOSINOPHILS ABSOLUTE: 0.1 K/UL (ref 0–0.7)
EOSINOPHILS RELATIVE PERCENT: 0.7 %
EPITHELIAL CELLS, UA: ABNORMAL /HPF (ref 0–5)
GLUCOSE BLD-MCNC: 81 MG/DL (ref 70–99)
GLUCOSE URINE: NEGATIVE MG/DL
GONADOTROPIN, CHORIONIC (HCG) QUANT: NORMAL MIU/ML
HBA1C MFR BLD: 5.3 % (ref 4.8–5.9)
HCT VFR BLD CALC: 36.9 % (ref 37–47)
HEMOGLOBIN: 12.2 G/DL (ref 12–16)
HEPATITIS B SURFACE ANTIGEN INTERPRETATION: NORMAL
HEPATITIS C ANTIBODY INTERPRETATION: NORMAL
HYALINE CASTS: ABNORMAL /HPF (ref 0–5)
KETONES, URINE: NEGATIVE MG/DL
LEUKOCYTE ESTERASE, URINE: ABNORMAL
LYMPHOCYTES ABSOLUTE: 1.2 K/UL (ref 1–4.8)
LYMPHOCYTES RELATIVE PERCENT: 16.6 %
Lab: NORMAL
MCH RBC QN AUTO: 24.7 PG (ref 27–31.3)
MCHC RBC AUTO-ENTMCNC: 32.9 % (ref 33–37)
MCV RBC AUTO: 75 FL (ref 82–100)
METHADONE SCREEN, URINE: NORMAL
MONOCYTES ABSOLUTE: 0.4 K/UL (ref 0.2–0.8)
MONOCYTES RELATIVE PERCENT: 5.7 %
NEUTROPHILS ABSOLUTE: 5.7 K/UL (ref 1.4–6.5)
NEUTROPHILS RELATIVE PERCENT: 76.8 %
NITRITE, URINE: NEGATIVE
OPIATE SCREEN URINE: NORMAL
OXYCODONE URINE: NORMAL
PDW BLD-RTO: 17.3 % (ref 11.5–14.5)
PH UA: 7 (ref 5–9)
PHENCYCLIDINE SCREEN URINE: NORMAL
PLATELET # BLD: 300 K/UL (ref 130–400)
PROPOXYPHENE SCREEN: NORMAL
PROTEIN UA: NEGATIVE MG/DL
RBC # BLD: 4.92 M/UL (ref 4.2–5.4)
RBC UA: ABNORMAL /HPF (ref 0–5)
RUBELLA ANTIBODY IGG: 34.5 IU/ML
SPECIFIC GRAVITY UA: 1.02 (ref 1–1.03)
TSH SERPL DL<=0.05 MIU/L-ACNC: 2.8 UIU/ML (ref 0.44–3.86)
UROBILINOGEN, URINE: 1 E.U./DL
WBC # BLD: 7.4 K/UL (ref 4.8–10.8)
WBC UA: ABNORMAL /HPF (ref 0–5)

## 2020-08-28 PROCEDURE — G8427 DOCREV CUR MEDS BY ELIG CLIN: HCPCS | Performed by: OBSTETRICS & GYNECOLOGY

## 2020-08-28 PROCEDURE — 99204 OFFICE O/P NEW MOD 45 MIN: CPT | Performed by: OBSTETRICS & GYNECOLOGY

## 2020-08-28 PROCEDURE — 1036F TOBACCO NON-USER: CPT | Performed by: OBSTETRICS & GYNECOLOGY

## 2020-08-28 PROCEDURE — G8417 CALC BMI ABV UP PARAM F/U: HCPCS | Performed by: OBSTETRICS & GYNECOLOGY

## 2020-08-28 RX ORDER — ONDANSETRON HYDROCHLORIDE 8 MG/1
8 TABLET, FILM COATED ORAL EVERY 8 HOURS PRN
Qty: 30 TABLET | Refills: 3 | Status: SHIPPED | OUTPATIENT
Start: 2020-08-28 | End: 2020-10-29

## 2020-08-28 ASSESSMENT — ENCOUNTER SYMPTOMS
BACK PAIN: 0
TROUBLE SWALLOWING: 0
NAUSEA: 0
WHEEZING: 0
BLOOD IN STOOL: 0
ABDOMINAL DISTENTION: 0
ABDOMINAL PAIN: 0
VOICE CHANGE: 0
SORE THROAT: 0
COUGH: 0
VOMITING: 0
SHORTNESS OF BREATH: 0
CHEST TIGHTNESS: 0
COLOR CHANGE: 0
CONSTIPATION: 0

## 2020-08-28 NOTE — PROGRESS NOTES
Subjective: Melina Da Silva is here for a amenorrhea visit. She is a 68-year-old  2 para 0-1-0-1. She was initially seen in [place] had cultures done. No other lab work was done she did have an ultrasound done on  which showed a 12-week 5-day viable intrauterine gestation. Patient is having some nausea she used Unisom and B6 it did not work. Her last pregnancy was complicated by preeclampsia she was delivered at 36 weeks. She had a spontaneous vaginal delivery. She has been advised and I encouraged her again to start taking a baby aspirin. Patient ID: Mouna Lucas is a 23 y.o. female. HPI    Review of Systems   Constitutional: Negative for activity change, appetite change, fatigue and unexpected weight change. HENT: Negative for dental problem, ear pain, hearing loss, nosebleeds, sore throat, trouble swallowing and voice change. Eyes: Negative for visual disturbance. Respiratory: Negative for cough, chest tightness, shortness of breath and wheezing. Cardiovascular: Negative for chest pain and palpitations. Gastrointestinal: Negative for abdominal distention, abdominal pain, blood in stool, constipation, nausea and vomiting. Endocrine: Negative for cold intolerance, heat intolerance, polydipsia, polyphagia and polyuria. Genitourinary: Negative for difficulty urinating, dyspareunia, dysuria, flank pain, frequency, genital sores, hematuria, menstrual problem, pelvic pain, urgency, vaginal bleeding, vaginal discharge and vaginal pain. Musculoskeletal: Negative for arthralgias, back pain, joint swelling and myalgias. Skin: Negative for color change and rash. Allergic/Immunologic: Negative for environmental allergies, food allergies and immunocompromised state. Neurological: Negative for dizziness, seizures, syncope, speech difficulty, weakness, numbness and headaches. Hematological: Negative for adenopathy. Does not bruise/bleed easily.    Psychiatric/Behavioral:

## 2020-08-29 LAB
RPR: NORMAL
URINE CULTURE, ROUTINE: NORMAL

## 2020-08-30 LAB — HIV 1,2 COMBO ANTIGEN/ANTIBODY: NEGATIVE

## 2020-08-31 LAB — VZV IGG SER QL IA: 107.6 IV

## 2020-09-07 LAB
EER NON INVASIVE PRENATAL ANEUPLOIDY: NORMAL
FETAL FRACTION: 6.7 %
FETAL GENDER: NORMAL
FETUS COUNT: 1
GESTATIONAL AGE (DAYS): 2
GESTATIONAL AGE(WEEKS): 13
HEIGHT: NORMAL
Lab: NORMAL
MATERNAL WEIGHT: 212
MONOSOMY X: NORMAL
REPORT FETUS GENDER: YES
TRIPLOIDY (VANISHING TWIN): NORMAL
TRISOMY 13 RISK: NORMAL
TRISOMY 18 RISK ASSESSMENT: NORMAL
TRISOMY 21 RISK: NORMAL

## 2020-09-28 PROBLEM — K21.9 GASTROESOPHAGEAL REFLUX DISEASE: Status: ACTIVE | Noted: 2020-09-28

## 2020-09-28 PROBLEM — E55.9 VITAMIN D DEFICIENCY: Status: ACTIVE | Noted: 2020-09-28

## 2020-09-28 PROBLEM — N93.8 DYSFUNCTIONAL UTERINE BLEEDING: Status: ACTIVE | Noted: 2020-09-28

## 2020-09-28 PROBLEM — R11.2 NAUSEA AND VOMITING: Status: ACTIVE | Noted: 2020-09-28

## 2020-09-29 ENCOUNTER — INITIAL PRENATAL (OUTPATIENT)
Dept: OBGYN CLINIC | Age: 20
End: 2020-09-29
Payer: COMMERCIAL

## 2020-09-29 VITALS
HEART RATE: 96 BPM | BODY MASS INDEX: 35.83 KG/M2 | SYSTOLIC BLOOD PRESSURE: 108 MMHG | DIASTOLIC BLOOD PRESSURE: 78 MMHG | WEIGHT: 222 LBS

## 2020-09-29 PROCEDURE — G8417 CALC BMI ABV UP PARAM F/U: HCPCS | Performed by: OBSTETRICS & GYNECOLOGY

## 2020-09-29 PROCEDURE — 1036F TOBACCO NON-USER: CPT | Performed by: OBSTETRICS & GYNECOLOGY

## 2020-09-29 PROCEDURE — 99213 OFFICE O/P EST LOW 20 MIN: CPT | Performed by: OBSTETRICS & GYNECOLOGY

## 2020-09-29 PROCEDURE — G8427 DOCREV CUR MEDS BY ELIG CLIN: HCPCS | Performed by: OBSTETRICS & GYNECOLOGY

## 2020-09-29 NOTE — PROGRESS NOTES
Here for prenatal visit at 17 weeks and 6 days. She has no complaints. Positive movement. Fetal heart rate is 145. No loss of fluid vaginal bleeding or uterine activity. Review of systems is negative. Labs were all normal panorama normal as well. IUP at 17 weeks and 6 days.   Plan follow-up in 4 weeks  Patient to have an ultrasound prior to her next visit

## 2020-10-17 ENCOUNTER — HOSPITAL ENCOUNTER (OUTPATIENT)
Dept: ULTRASOUND IMAGING | Age: 20
Discharge: HOME | End: 2020-10-19
Payer: COMMERCIAL

## 2020-10-17 PROCEDURE — 76817 TRANSVAGINAL US OBSTETRIC: CPT

## 2020-10-17 PROCEDURE — 76805 OB US >/= 14 WKS SNGL FETUS: CPT

## 2020-10-29 ENCOUNTER — ROUTINE PRENATAL (OUTPATIENT)
Dept: OBGYN CLINIC | Age: 20
End: 2020-10-29
Payer: COMMERCIAL

## 2020-10-29 VITALS
WEIGHT: 228 LBS | DIASTOLIC BLOOD PRESSURE: 62 MMHG | SYSTOLIC BLOOD PRESSURE: 124 MMHG | BODY MASS INDEX: 36.8 KG/M2 | HEART RATE: 92 BPM

## 2020-10-29 PROCEDURE — 1036F TOBACCO NON-USER: CPT | Performed by: OBSTETRICS & GYNECOLOGY

## 2020-10-29 PROCEDURE — G8484 FLU IMMUNIZE NO ADMIN: HCPCS | Performed by: OBSTETRICS & GYNECOLOGY

## 2020-10-29 PROCEDURE — G8417 CALC BMI ABV UP PARAM F/U: HCPCS | Performed by: OBSTETRICS & GYNECOLOGY

## 2020-10-29 PROCEDURE — G8427 DOCREV CUR MEDS BY ELIG CLIN: HCPCS | Performed by: OBSTETRICS & GYNECOLOGY

## 2020-10-29 PROCEDURE — 99213 OFFICE O/P EST LOW 20 MIN: CPT | Performed by: OBSTETRICS & GYNECOLOGY

## 2020-10-29 NOTE — PROGRESS NOTES
Delonte Serrato is here for prenatal visit at 25 weeks. She has no complaints. Positive fetal movement. Fetal heart rate is 140. Ultrasound reveals some subvisualized areas will be repeated in 4 weeks. Review of systems negative no change in her history. IUP at 22 weeks. Plan follow-up in 4 weeks.   Patient to do her glucose tolerance test at that visit

## 2020-12-15 ENCOUNTER — ROUTINE PRENATAL (OUTPATIENT)
Dept: OBGYN CLINIC | Age: 20
End: 2020-12-15
Payer: COMMERCIAL

## 2020-12-15 VITALS
SYSTOLIC BLOOD PRESSURE: 140 MMHG | BODY MASS INDEX: 38.41 KG/M2 | HEART RATE: 116 BPM | DIASTOLIC BLOOD PRESSURE: 72 MMHG | WEIGHT: 238 LBS

## 2020-12-15 DIAGNOSIS — Z34.80 ENCOUNTER FOR SUPERVISION OF NORMAL INTRAUTERINE PREGNANCY IN MULTIGRAVIDA, ANTEPARTUM: ICD-10-CM

## 2020-12-15 LAB
GLUCOSE, 1HR PP: 144 MG/DL (ref 60–140)
HCT VFR BLD CALC: 33.5 % (ref 37–47)
HEMOGLOBIN: 10.9 G/DL (ref 12–16)

## 2020-12-15 PROCEDURE — G8484 FLU IMMUNIZE NO ADMIN: HCPCS | Performed by: OBSTETRICS & GYNECOLOGY

## 2020-12-15 PROCEDURE — 1036F TOBACCO NON-USER: CPT | Performed by: OBSTETRICS & GYNECOLOGY

## 2020-12-15 PROCEDURE — 99213 OFFICE O/P EST LOW 20 MIN: CPT | Performed by: OBSTETRICS & GYNECOLOGY

## 2020-12-15 PROCEDURE — G8417 CALC BMI ABV UP PARAM F/U: HCPCS | Performed by: OBSTETRICS & GYNECOLOGY

## 2020-12-15 PROCEDURE — G8427 DOCREV CUR MEDS BY ELIG CLIN: HCPCS | Performed by: OBSTETRICS & GYNECOLOGY

## 2020-12-15 NOTE — PROGRESS NOTES
Dany Apley is here for prenatal visit at 28 weeks and 6 days. She is a 23-year-old  3 para 1-0-1-1. She is feeling tired otherwise doing well. She is doing her GTT today. Her vital signs are stable she has no history of blood pressure abnormalities  Fetal heart rate is 137. There is good fetal movement. No loss of fluid vaginal bleeding or uterine activity. No headache no vision changes no right upper quadrant pain  IUP at 28 weeks and 6 days. Plan follow-up in 4 weeks.   Will call with the results of the GTT today when it is available

## 2021-01-02 ENCOUNTER — HOSPITAL ENCOUNTER (OUTPATIENT)
Age: 21
Discharge: HOME | End: 2021-01-02
Attending: OBSTETRICS & GYNECOLOGY | Admitting: OBSTETRICS & GYNECOLOGY
Payer: COMMERCIAL

## 2021-01-02 VITALS
OXYGEN SATURATION: 98 % | BODY MASS INDEX: 38.51 KG/M2 | DIASTOLIC BLOOD PRESSURE: 80 MMHG | RESPIRATION RATE: 16 BRPM | TEMPERATURE: 98.4 F | SYSTOLIC BLOOD PRESSURE: 134 MMHG | WEIGHT: 238.6 LBS | HEART RATE: 86 BPM

## 2021-01-02 PROCEDURE — 99283 EMERGENCY DEPT VISIT LOW MDM: CPT

## 2021-01-02 PROCEDURE — 59025 FETAL NON-STRESS TEST: CPT

## 2021-01-02 PROCEDURE — 99283 EMERGENCY DEPT VISIT LOW MDM: CPT | Performed by: OBSTETRICS & GYNECOLOGY

## 2021-01-02 PROCEDURE — 59025 FETAL NON-STRESS TEST: CPT | Performed by: OBSTETRICS & GYNECOLOGY

## 2021-01-03 NOTE — ED TRIAGE NOTES
Labor and Delivery Triage Note        CHIEF COMPLAINT:  Lower abdominal pain    HISTORY OF PRESENT ILLNESS:      The patient is a 23 y.o. female  31w3d. OB History        3    Para   1    Term   1       0    AB   1    Living   1       SAB   1    TAB   0    Ectopic   0    Molar   0    Multiple   0    Live Births   1              Patient presents complaining of  Lower abdominal pain  She reports Normal fetal movement. She denies vaginal bleeding. She denies leakage of amniotic fluid     Estimated Due Date:  Estimated Date of Delivery: 3/3/21    PAST MEDICAL HISTORY:   Past Medical History:   Diagnosis Date    Anxiety     BMI (body mass index), pediatric, 85% to less than 95% for age 2014    Chronic hypertension affecting pregnancy     Class 1 obesity due to excess calories without serious comorbidity with body mass index (BMI) of 33.0 to 33.9 in adult 2019    Depression     hx of depression    Generalized abdominal pain 2017    Headache(784.0)     History of depression 2013    Hypertension      labor third trimester with  delivery third trimester 2019       PAST  SURGICAL HISTORY:   Past Surgical History:   Procedure Laterality Date    WISDOM TOOTH EXTRACTION         SOCIAL HISTORY:     reports that she has never smoked. She has quit using smokeless tobacco. She reports that she does not drink alcohol or use drugs. MEDICATIONS:    Prior to Admission medications    Not on File        FAMILY HISTORY:   Family History   Problem Relation Age of Onset    Cancer Mother     Arthritis Father     High Blood Pressure Father     Heart Disease Father     Heart Disease Maternal Grandmother     Diabetes Paternal Grandfather        DRUG ALLERGIES: Patient has no known allergies. PRENATAL CARE:   Complicated by: none    REVIEW OF SYSTEMS:     Pertinent items are noted in HPI.     PHYSICAL EXAM:    Vital Signs: Blood pressure 134/80, pulse 86, temperature 98.4 °F (36.9 °C), temperature source Oral, resp. rate 16, last menstrual period 2020, SpO2 98 %, unknown if currently breastfeeding. CONSTITUTIONAL:  awake, alert, cooperative, no apparent distress, and appears stated age  LUNGS:  No increased work of breathing, good air exchange, clear to auscultation bilaterally, no crackles or wheezing  CARDIOVASCULAR:  Normal apical impulse, regular rate and rhythm, normal S1 and S2, no S3 or S4, and no murmur noted  ABDOMEN:  No scars, normal bowel sounds, soft, non-distended, non-tender, no masses palpated, no hepatosplenomegally  Cervix:  Deferred    Fetal heart rate on NST:  Baseline Heart Rate 120 bpm, accelerations:  present    Contraction frequency on tocometer:  none    Membranes:  Intact    General Labs:      No visits with results within 1 Day(s) from this visit. Latest known visit with results is:   Orders Only on 12/15/2020   Component Date Value Ref Range Status    Hemoglobin 12/15/2020 10.9* 12.0 - 16.0 g/dL Final    Hematocrit 12/15/2020 33.5* 37.0 - 47.0 % Final    Glucose, 1Hr PP 12/15/2020 144* 60 - 140 mg/dL Final    Comment: Glucose tolerance is IMPAIRED when the 1 hour post 50 gram  load glucose is greater than 130mg/dL           ASSESSMENT:    The patient is a 23 y.o. female  31w3d with :     Lower abdominal pain   No signs of  labor   Probable round ligament pain     There are no active hospital problems to display for this patient. No orders of the defined types were placed in this encounter. No orders of the defined types were placed in this encounter. PLAN:  Disposition:  Patient discharged home and instructed on symptoms of labor, rupture of membranes, vaginal bleeding, fetal movement and fever  Medications:      Medication List      You have not been prescribed any medications.         F/U Instructions: next scheduled appointment     Caty House MD  2021

## 2021-01-03 NOTE — FLOWSHEET NOTE
2030 Called Dr. Fracisco Zheng update given on pt arrival and reason for visit. 2035 Dr. Fracisco Zheng bedside. Educated pt at this time and pt verbalized understanding.

## 2021-01-03 NOTE — FLOWSHEET NOTE
Discharge instructions given to pt. Instructed pt to come back if increase pain, loss of fluid, and vaginal bleeding. Pt verbalized understanding. Pt ambulated off unit in stable conditions.

## 2021-01-03 NOTE — FLOWSHEET NOTE
Pt arrives to [facility] ER with complaints of lower abdominal pain that started about 4 days ago but is getting worst. Pt denies loss of fluid, bleeding, and sexual intercourse in the last 24 hrs and +fetal movement.

## 2021-01-12 ENCOUNTER — ROUTINE PRENATAL (OUTPATIENT)
Dept: OBGYN CLINIC | Age: 21
End: 2021-01-12
Payer: COMMERCIAL

## 2021-01-12 VITALS
WEIGHT: 241 LBS | DIASTOLIC BLOOD PRESSURE: 80 MMHG | HEART RATE: 116 BPM | BODY MASS INDEX: 38.9 KG/M2 | SYSTOLIC BLOOD PRESSURE: 140 MMHG

## 2021-01-12 DIAGNOSIS — O13.3 GESTATIONAL HYPERTENSION, THIRD TRIMESTER: Primary | ICD-10-CM

## 2021-01-12 PROCEDURE — G8484 FLU IMMUNIZE NO ADMIN: HCPCS | Performed by: OBSTETRICS & GYNECOLOGY

## 2021-01-12 PROCEDURE — 99213 OFFICE O/P EST LOW 20 MIN: CPT | Performed by: OBSTETRICS & GYNECOLOGY

## 2021-01-12 PROCEDURE — 1036F TOBACCO NON-USER: CPT | Performed by: OBSTETRICS & GYNECOLOGY

## 2021-01-12 PROCEDURE — G8427 DOCREV CUR MEDS BY ELIG CLIN: HCPCS | Performed by: OBSTETRICS & GYNECOLOGY

## 2021-01-12 PROCEDURE — G8417 CALC BMI ABV UP PARAM F/U: HCPCS | Performed by: OBSTETRICS & GYNECOLOGY

## 2021-01-12 RX ORDER — ERGOCALCIFEROL (VITAMIN D2) 1250 MCG
CAPSULE ORAL
COMMUNITY
Start: 2020-06-05 | End: 2021-06-07

## 2021-01-12 NOTE — PROGRESS NOTES
Francie Watkins is here for prenatal visit at 26 weeks and 6 days. She is a  3 para 1-0-1-1. Blood pressure is slightly elevated today. She denies headaches right upper quadrant pain or vision changes. Positive fetal movement. Kick counts twice daily are good the fetal heart rate is 150  No loss of fluid vaginal bleeding or uterine activity  Fundal height is 33.   DTR 2/  Patient is need to do her 3-hour GTT and will get a CBC and a comp panel today she will come back here on Friday for another blood pressure evaluation

## 2021-01-21 ENCOUNTER — PATIENT MESSAGE (OUTPATIENT)
Dept: OBGYN CLINIC | Age: 21
End: 2021-01-21

## 2021-01-21 RX ORDER — ONDANSETRON HYDROCHLORIDE 8 MG/1
8 TABLET, FILM COATED ORAL EVERY 8 HOURS PRN
Qty: 30 TABLET | Refills: 3 | Status: SHIPPED | OUTPATIENT
Start: 2021-01-21 | End: 2021-06-07

## 2021-02-04 ENCOUNTER — ROUTINE PRENATAL (OUTPATIENT)
Dept: OBGYN CLINIC | Age: 21
End: 2021-02-04
Payer: COMMERCIAL

## 2021-02-04 VITALS
HEART RATE: 96 BPM | WEIGHT: 250 LBS | SYSTOLIC BLOOD PRESSURE: 132 MMHG | BODY MASS INDEX: 40.35 KG/M2 | DIASTOLIC BLOOD PRESSURE: 86 MMHG

## 2021-02-04 DIAGNOSIS — Z34.80 ENCOUNTER FOR SUPERVISION OF NORMAL INTRAUTERINE PREGNANCY IN MULTIGRAVIDA, ANTEPARTUM: ICD-10-CM

## 2021-02-04 DIAGNOSIS — Z01.818 PREOP EXAMINATION: Primary | ICD-10-CM

## 2021-02-04 PROCEDURE — 1036F TOBACCO NON-USER: CPT | Performed by: OBSTETRICS & GYNECOLOGY

## 2021-02-04 PROCEDURE — G8484 FLU IMMUNIZE NO ADMIN: HCPCS | Performed by: OBSTETRICS & GYNECOLOGY

## 2021-02-04 PROCEDURE — 99213 OFFICE O/P EST LOW 20 MIN: CPT | Performed by: OBSTETRICS & GYNECOLOGY

## 2021-02-04 PROCEDURE — G8417 CALC BMI ABV UP PARAM F/U: HCPCS | Performed by: OBSTETRICS & GYNECOLOGY

## 2021-02-04 PROCEDURE — G8427 DOCREV CUR MEDS BY ELIG CLIN: HCPCS | Performed by: OBSTETRICS & GYNECOLOGY

## 2021-02-04 NOTE — PROGRESS NOTES
Donald Tucker is here for prenatal visit at 42 weeks  She is a 57-year-old  3 para 1-0-1-1. She had a full-term spontaneous vaginal delivery in 2019. She is doing well other than being uncomfortable. Positive fetal movement. Kick counts twice daily are good. Fetal heart rate is 150. Vital signs are stable of birth blood pressure is normal.  Review of systems is negative no headache no vision changes no nausea or vomiting no loss of fluid vaginal bleeding or uterine activity  No change in her history medical surgical family or social.  IUP at 42 weeks. Group B strep culture was done today  Patient to follow-up in 1 week.   20 minutes spent with the patient discussing all pregnancies

## 2021-02-07 LAB
GROUP B STREP CULTURE: ABNORMAL
ORGANISM: ABNORMAL

## 2021-02-11 ENCOUNTER — ROUTINE PRENATAL (OUTPATIENT)
Dept: OBGYN CLINIC | Age: 21
End: 2021-02-11
Payer: COMMERCIAL

## 2021-02-11 VITALS
WEIGHT: 252 LBS | HEART RATE: 66 BPM | BODY MASS INDEX: 40.67 KG/M2 | SYSTOLIC BLOOD PRESSURE: 122 MMHG | DIASTOLIC BLOOD PRESSURE: 86 MMHG

## 2021-02-11 DIAGNOSIS — Z34.83 SUPERVISION OF NORMAL INTRAUTERINE PREGNANCY IN MULTIGRAVIDA IN THIRD TRIMESTER: Primary | ICD-10-CM

## 2021-02-11 PROCEDURE — G8484 FLU IMMUNIZE NO ADMIN: HCPCS | Performed by: OBSTETRICS & GYNECOLOGY

## 2021-02-11 PROCEDURE — G8417 CALC BMI ABV UP PARAM F/U: HCPCS | Performed by: OBSTETRICS & GYNECOLOGY

## 2021-02-11 PROCEDURE — 99213 OFFICE O/P EST LOW 20 MIN: CPT | Performed by: OBSTETRICS & GYNECOLOGY

## 2021-02-11 PROCEDURE — 1036F TOBACCO NON-USER: CPT | Performed by: OBSTETRICS & GYNECOLOGY

## 2021-02-11 PROCEDURE — G8427 DOCREV CUR MEDS BY ELIG CLIN: HCPCS | Performed by: OBSTETRICS & GYNECOLOGY

## 2021-02-11 NOTE — PROGRESS NOTES
He is here for prenatal visit at 42 weeks. She is a 78-year-old  3 para 1-0-1-1. She has had a previous full-term spontaneous vaginal delivery that was uncomplicated. She is doing well. Her 1hour GTT was 144 and she is been noncompliant in having this 3-hour done. We did discuss with her at length dietary changes. She is doing well kick counts twice daily are good fetal movement has been excellent fetal heart rate is 145. Her cervix is 1 cm dilated 40% effaced vertex is at a -1 station. There is no loss of fluid vaginal bleeding or uterine activity.   Review of systems is negative there is no change in her history  IUP at 37 weeks  Follow-up in 1 week  20 minutes spent with patient discussing all these issues including gestational diabetes

## 2021-02-16 ENCOUNTER — HOSPITAL ENCOUNTER (OUTPATIENT)
Dept: ULTRASOUND IMAGING | Age: 21
Discharge: HOME | End: 2021-02-18
Payer: COMMERCIAL

## 2021-02-16 DIAGNOSIS — Z34.92 PRENATAL CARE, SECOND TRIMESTER: ICD-10-CM

## 2021-02-16 PROCEDURE — 76815 OB US LIMITED FETUS(S): CPT

## 2021-02-19 ENCOUNTER — ROUTINE PRENATAL (OUTPATIENT)
Dept: OBGYN CLINIC | Age: 21
End: 2021-02-19
Payer: COMMERCIAL

## 2021-02-19 VITALS
BODY MASS INDEX: 41.8 KG/M2 | SYSTOLIC BLOOD PRESSURE: 122 MMHG | WEIGHT: 259 LBS | HEART RATE: 93 BPM | DIASTOLIC BLOOD PRESSURE: 86 MMHG

## 2021-02-19 DIAGNOSIS — Z34.83 SUPERVISION OF NORMAL INTRAUTERINE PREGNANCY IN MULTIGRAVIDA IN THIRD TRIMESTER: Primary | ICD-10-CM

## 2021-02-19 PROCEDURE — G8427 DOCREV CUR MEDS BY ELIG CLIN: HCPCS | Performed by: OBSTETRICS & GYNECOLOGY

## 2021-02-19 PROCEDURE — G8417 CALC BMI ABV UP PARAM F/U: HCPCS | Performed by: OBSTETRICS & GYNECOLOGY

## 2021-02-19 PROCEDURE — 99213 OFFICE O/P EST LOW 20 MIN: CPT | Performed by: OBSTETRICS & GYNECOLOGY

## 2021-02-19 PROCEDURE — 1036F TOBACCO NON-USER: CPT | Performed by: OBSTETRICS & GYNECOLOGY

## 2021-02-19 PROCEDURE — G8484 FLU IMMUNIZE NO ADMIN: HCPCS | Performed by: OBSTETRICS & GYNECOLOGY

## 2021-02-19 NOTE — PROGRESS NOTES
Beverley Lauren is here for prenatal visit at 37 weeks and 2 days. She is a 64-year-old  3 para 1-0-1-1. Positive fetal movement. No loss of fluid vaginal bleeding. Kick counts twice daily are good. Fetal heart rate is 145. She is experiencing some cramping not distinct uterine contractions. Her recent ultrasound showed AGA fetus adequate amniotic fluid with an GRETCHEN of 10. Cervix is 2 cm 50% effaced vertex is at a -1 station. Review of systems negative no change in her history  IUP at 38 weeks  Plan patient to follow-up in 1 week labor warnings given. 20 minutes spent with patient discussing these issues.

## 2021-02-27 ENCOUNTER — ANESTHESIA EVENT (OUTPATIENT)
Dept: LABOR AND DELIVERY | Age: 21
DRG: 560 | End: 2021-02-27
Payer: COMMERCIAL

## 2021-02-27 ENCOUNTER — ANESTHESIA (OUTPATIENT)
Dept: LABOR AND DELIVERY | Age: 21
DRG: 560 | End: 2021-02-27
Payer: COMMERCIAL

## 2021-02-27 ENCOUNTER — HOSPITAL ENCOUNTER (INPATIENT)
Age: 21
LOS: 1 days | Discharge: HOME | DRG: 560 | End: 2021-02-28
Attending: OBSTETRICS & GYNECOLOGY | Admitting: OBSTETRICS & GYNECOLOGY
Payer: COMMERCIAL

## 2021-02-27 PROBLEM — Z37.9 NORMAL LABOR: Status: ACTIVE | Noted: 2021-02-27

## 2021-02-27 LAB
ABO/RH: NORMAL
ALBUMIN SERPL-MCNC: 3.4 G/DL (ref 3.5–4.6)
ALP BLD-CCNC: 134 U/L (ref 40–130)
ALT SERPL-CCNC: 8 U/L (ref 0–33)
AMPHETAMINE SCREEN, URINE: NORMAL
ANION GAP SERPL CALCULATED.3IONS-SCNC: 8 MEQ/L (ref 9–15)
ANISOCYTOSIS: ABNORMAL
ANTIBODY SCREEN: NORMAL
AST SERPL-CCNC: 12 U/L (ref 0–35)
BACTERIA: ABNORMAL /HPF
BARBITURATE SCREEN URINE: NORMAL
BASOPHILS ABSOLUTE: 0 K/UL (ref 0–0.2)
BASOPHILS RELATIVE PERCENT: 0.2 %
BENZODIAZEPINE SCREEN, URINE: NORMAL
BILIRUB SERPL-MCNC: <0.2 MG/DL (ref 0.2–0.7)
BILIRUBIN URINE: NEGATIVE
BLOOD, URINE: NEGATIVE
BUN BLDV-MCNC: 13 MG/DL (ref 6–20)
CALCIUM SERPL-MCNC: 9.4 MG/DL (ref 8.5–9.9)
CANNABINOID SCREEN URINE: NORMAL
CHLORIDE BLD-SCNC: 98 MEQ/L (ref 95–107)
CLARITY: ABNORMAL
CO2: 23 MEQ/L (ref 20–31)
COCAINE METABOLITE SCREEN URINE: NORMAL
COLOR: YELLOW
CREAT SERPL-MCNC: 0.54 MG/DL (ref 0.5–0.9)
EOSINOPHILS ABSOLUTE: 0.1 K/UL (ref 0–0.7)
EOSINOPHILS RELATIVE PERCENT: 0.8 %
EPITHELIAL CELLS, UA: ABNORMAL /HPF (ref 0–5)
GFR AFRICAN AMERICAN: >60
GFR NON-AFRICAN AMERICAN: >60
GLOBULIN: 3.4 G/DL (ref 2.3–3.5)
GLUCOSE BLD-MCNC: 86 MG/DL (ref 70–99)
GLUCOSE URINE: NEGATIVE MG/DL
HCT VFR BLD CALC: 33.8 % (ref 37–47)
HEMOGLOBIN: 10.6 G/DL (ref 12–16)
HEPATITIS B SURFACE ANTIGEN INTERPRETATION: NORMAL
HYALINE CASTS: ABNORMAL /HPF (ref 0–5)
KETONES, URINE: NEGATIVE MG/DL
LEUKOCYTE ESTERASE, URINE: ABNORMAL
LYMPHOCYTES ABSOLUTE: 1.6 K/UL (ref 1–4.8)
LYMPHOCYTES RELATIVE PERCENT: 18.3 %
Lab: NORMAL
MCH RBC QN AUTO: 22.2 PG (ref 27–31.3)
MCHC RBC AUTO-ENTMCNC: 31.4 % (ref 33–37)
MCV RBC AUTO: 70.8 FL (ref 82–100)
METHADONE SCREEN, URINE: NORMAL
MICROCYTES: ABNORMAL
MONOCYTES ABSOLUTE: 0.6 K/UL (ref 0.2–0.8)
MONOCYTES RELATIVE PERCENT: 6.6 %
NEUTROPHILS ABSOLUTE: 6.5 K/UL (ref 1.4–6.5)
NEUTROPHILS RELATIVE PERCENT: 74.1 %
NITRITE, URINE: NEGATIVE
OPIATE SCREEN URINE: NORMAL
OVALOCYTES: ABNORMAL
OXYCODONE URINE: NORMAL
PDW BLD-RTO: 16.9 % (ref 11.5–14.5)
PH UA: 7 (ref 5–9)
PHENCYCLIDINE SCREEN URINE: NORMAL
PLATELET # BLD: 352 K/UL (ref 130–400)
PLATELET SLIDE REVIEW: NORMAL
POIKILOCYTES: ABNORMAL
POTASSIUM SERPL-SCNC: 4.2 MEQ/L (ref 3.4–4.9)
PROPOXYPHENE SCREEN: NORMAL
PROTEIN UA: NEGATIVE MG/DL
RBC # BLD: 4.78 M/UL (ref 4.2–5.4)
RBC UA: ABNORMAL /HPF (ref 0–5)
RPR: NORMAL
SARS-COV-2, NAAT: NOT DETECTED
SODIUM BLD-SCNC: 129 MEQ/L (ref 135–144)
SPECIFIC GRAVITY UA: 1.01 (ref 1–1.03)
TOTAL PROTEIN: 6.8 G/DL (ref 6.3–8)
URIC ACID, SERUM: 3.6 MG/DL (ref 2.4–5.7)
UROBILINOGEN, URINE: 0.2 E.U./DL
WBC # BLD: 8.8 K/UL (ref 4.8–10.8)
WBC UA: ABNORMAL /HPF (ref 0–5)

## 2021-02-27 PROCEDURE — 3700000025 EPIDURAL BLOCK: Performed by: NURSE ANESTHETIST, CERTIFIED REGISTERED

## 2021-02-27 PROCEDURE — 86900 BLOOD TYPING SEROLOGIC ABO: CPT

## 2021-02-27 PROCEDURE — 81001 URINALYSIS AUTO W/SCOPE: CPT

## 2021-02-27 PROCEDURE — 85025 COMPLETE CBC W/AUTO DIFF WBC: CPT

## 2021-02-27 PROCEDURE — 86850 RBC ANTIBODY SCREEN: CPT

## 2021-02-27 PROCEDURE — 2580000003 HC RX 258: Performed by: OBSTETRICS & GYNECOLOGY

## 2021-02-27 PROCEDURE — 80053 COMPREHEN METABOLIC PANEL: CPT

## 2021-02-27 PROCEDURE — 6370000000 HC RX 637 (ALT 250 FOR IP): Performed by: OBSTETRICS & GYNECOLOGY

## 2021-02-27 PROCEDURE — 6360000002 HC RX W HCPCS: Performed by: OBSTETRICS & GYNECOLOGY

## 2021-02-27 PROCEDURE — 2500000003 HC RX 250 WO HCPCS: Performed by: OBSTETRICS & GYNECOLOGY

## 2021-02-27 PROCEDURE — 0KQM0ZZ REPAIR PERINEUM MUSCLE, OPEN APPROACH: ICD-10-PCS | Performed by: OBSTETRICS & GYNECOLOGY

## 2021-02-27 PROCEDURE — 86901 BLOOD TYPING SEROLOGIC RH(D): CPT

## 2021-02-27 PROCEDURE — 86592 SYPHILIS TEST NON-TREP QUAL: CPT

## 2021-02-27 PROCEDURE — 36415 COLL VENOUS BLD VENIPUNCTURE: CPT

## 2021-02-27 PROCEDURE — 87340 HEPATITIS B SURFACE AG IA: CPT

## 2021-02-27 PROCEDURE — 80307 DRUG TEST PRSMV CHEM ANLYZR: CPT

## 2021-02-27 PROCEDURE — 87635 SARS-COV-2 COVID-19 AMP PRB: CPT

## 2021-02-27 PROCEDURE — 3E0P7VZ INTRODUCTION OF HORMONE INTO FEMALE REPRODUCTIVE, VIA NATURAL OR ARTIFICIAL OPENING: ICD-10-PCS | Performed by: OBSTETRICS & GYNECOLOGY

## 2021-02-27 PROCEDURE — 2500000003 HC RX 250 WO HCPCS: Performed by: NURSE ANESTHETIST, CERTIFIED REGISTERED

## 2021-02-27 PROCEDURE — 1220000000 HC SEMI PRIVATE OB R&B

## 2021-02-27 PROCEDURE — 59409 OBSTETRICAL CARE: CPT | Performed by: OBSTETRICS & GYNECOLOGY

## 2021-02-27 PROCEDURE — 84550 ASSAY OF BLOOD/URIC ACID: CPT

## 2021-02-27 RX ORDER — SODIUM CHLORIDE 0.9 % (FLUSH) 0.9 %
10 SYRINGE (ML) INJECTION EVERY 12 HOURS SCHEDULED
Status: DISCONTINUED | OUTPATIENT
Start: 2021-02-27 | End: 2021-02-28 | Stop reason: HOSPADM

## 2021-02-27 RX ORDER — HYDROCODONE BITARTRATE AND ACETAMINOPHEN 5; 325 MG/1; MG/1
2 TABLET ORAL EVERY 4 HOURS PRN
Status: DISCONTINUED | OUTPATIENT
Start: 2021-02-27 | End: 2021-02-28 | Stop reason: HOSPADM

## 2021-02-27 RX ORDER — NALOXONE HYDROCHLORIDE 0.4 MG/ML
0.4 INJECTION, SOLUTION INTRAMUSCULAR; INTRAVENOUS; SUBCUTANEOUS PRN
Status: DISCONTINUED | OUTPATIENT
Start: 2021-02-27 | End: 2021-02-28 | Stop reason: HOSPADM

## 2021-02-27 RX ORDER — SODIUM CHLORIDE, SODIUM LACTATE, POTASSIUM CHLORIDE, CALCIUM CHLORIDE 600; 310; 30; 20 MG/100ML; MG/100ML; MG/100ML; MG/100ML
INJECTION, SOLUTION INTRAVENOUS CONTINUOUS
Status: DISCONTINUED | OUTPATIENT
Start: 2021-02-27 | End: 2021-02-28 | Stop reason: HOSPADM

## 2021-02-27 RX ORDER — PENICILLIN G 3000000 [IU]/50ML
3 INJECTION, SOLUTION INTRAVENOUS EVERY 4 HOURS
Status: DISCONTINUED | OUTPATIENT
Start: 2021-02-27 | End: 2021-02-28 | Stop reason: HOSPADM

## 2021-02-27 RX ORDER — ONDANSETRON 2 MG/ML
4 INJECTION INTRAMUSCULAR; INTRAVENOUS EVERY 6 HOURS PRN
Status: DISCONTINUED | OUTPATIENT
Start: 2021-02-27 | End: 2021-02-28 | Stop reason: HOSPADM

## 2021-02-27 RX ORDER — ACETAMINOPHEN 325 MG/1
650 TABLET ORAL EVERY 4 HOURS PRN
Status: DISCONTINUED | OUTPATIENT
Start: 2021-02-27 | End: 2021-02-28 | Stop reason: HOSPADM

## 2021-02-27 RX ORDER — MODIFIED LANOLIN
OINTMENT (GRAM) TOPICAL PRN
Status: DISCONTINUED | OUTPATIENT
Start: 2021-02-27 | End: 2021-02-28 | Stop reason: HOSPADM

## 2021-02-27 RX ORDER — DOCUSATE SODIUM 100 MG/1
100 CAPSULE, LIQUID FILLED ORAL DAILY
Status: DISCONTINUED | OUTPATIENT
Start: 2021-02-27 | End: 2021-02-28 | Stop reason: HOSPADM

## 2021-02-27 RX ORDER — SODIUM CHLORIDE 0.9 % (FLUSH) 0.9 %
10 SYRINGE (ML) INJECTION PRN
Status: DISCONTINUED | OUTPATIENT
Start: 2021-02-27 | End: 2021-02-28 | Stop reason: HOSPADM

## 2021-02-27 RX ORDER — DOCUSATE SODIUM 100 MG/1
100 CAPSULE, LIQUID FILLED ORAL 2 TIMES DAILY PRN
Status: DISCONTINUED | OUTPATIENT
Start: 2021-02-27 | End: 2021-02-28 | Stop reason: HOSPADM

## 2021-02-27 RX ORDER — LABETALOL HYDROCHLORIDE 5 MG/ML
20 INJECTION, SOLUTION INTRAVENOUS ONCE
Status: COMPLETED | OUTPATIENT
Start: 2021-02-27 | End: 2021-02-27

## 2021-02-27 RX ORDER — ACETAMINOPHEN 325 MG/1
650 TABLET ORAL EVERY 4 HOURS PRN
Status: DISCONTINUED | OUTPATIENT
Start: 2021-02-27 | End: 2021-02-27 | Stop reason: SDUPTHER

## 2021-02-27 RX ORDER — NALBUPHINE HCL 10 MG/ML
5 AMPUL (ML) INJECTION
Status: ACTIVE | OUTPATIENT
Start: 2021-02-27 | End: 2021-02-27

## 2021-02-27 RX ORDER — HYDROCODONE BITARTRATE AND ACETAMINOPHEN 5; 325 MG/1; MG/1
1 TABLET ORAL EVERY 4 HOURS PRN
Status: DISCONTINUED | OUTPATIENT
Start: 2021-02-27 | End: 2021-02-28 | Stop reason: HOSPADM

## 2021-02-27 RX ORDER — IBUPROFEN 600 MG/1
600 TABLET ORAL EVERY 6 HOURS PRN
Status: DISCONTINUED | OUTPATIENT
Start: 2021-02-27 | End: 2021-02-28 | Stop reason: HOSPADM

## 2021-02-27 RX ORDER — DIPHENHYDRAMINE HCL 25 MG
25 TABLET ORAL EVERY 4 HOURS PRN
Status: DISCONTINUED | OUTPATIENT
Start: 2021-02-27 | End: 2021-02-28 | Stop reason: HOSPADM

## 2021-02-27 RX ADMIN — SODIUM CHLORIDE, POTASSIUM CHLORIDE, SODIUM LACTATE AND CALCIUM CHLORIDE: 600; 310; 30; 20 INJECTION, SOLUTION INTRAVENOUS at 15:11

## 2021-02-27 RX ADMIN — Medication 5 ML: at 17:00

## 2021-02-27 RX ADMIN — LABETALOL HYDROCHLORIDE 20 MG: 5 INJECTION, SOLUTION INTRAVENOUS at 10:33

## 2021-02-27 RX ADMIN — Medication 5 ML: at 16:50

## 2021-02-27 RX ADMIN — Medication 25 MCG: at 10:15

## 2021-02-27 RX ADMIN — SODIUM CHLORIDE, POTASSIUM CHLORIDE, SODIUM LACTATE AND CALCIUM CHLORIDE: 600; 310; 30; 20 INJECTION, SOLUTION INTRAVENOUS at 16:14

## 2021-02-27 RX ADMIN — SODIUM CHLORIDE, POTASSIUM CHLORIDE, SODIUM LACTATE AND CALCIUM CHLORIDE: 600; 310; 30; 20 INJECTION, SOLUTION INTRAVENOUS at 10:05

## 2021-02-27 RX ADMIN — Medication 12 ML/HR: at 17:02

## 2021-02-27 RX ADMIN — PENICILLIN G 3 MILLION UNITS: 3000000 INJECTION, SOLUTION INTRAVENOUS at 17:20

## 2021-02-27 RX ADMIN — Medication 1 MILLI-UNITS/MIN: at 13:59

## 2021-02-27 RX ADMIN — DEXTROSE MONOHYDRATE: 5 INJECTION INTRAVENOUS at 10:05

## 2021-02-27 RX ADMIN — IBUPROFEN 600 MG: 600 TABLET, FILM COATED ORAL at 22:20

## 2021-02-27 RX ADMIN — PENICILLIN G 3 MILLION UNITS: 3000000 INJECTION, SOLUTION INTRAVENOUS at 13:59

## 2021-02-27 RX ADMIN — BENZOCAINE AND LEVOMENTHOL: 200; 5 SPRAY TOPICAL at 22:20

## 2021-02-27 RX ADMIN — Medication 14 MILLI-UNITS/MIN: at 18:01

## 2021-02-27 ASSESSMENT — PAIN SCALES - GENERAL: PAINLEVEL_OUTOF10: 2

## 2021-02-27 NOTE — H&P
depression    Generalized abdominal pain 2017    Headache(784.0)     History of depression 2013    Hypertension      labor third trimester with  delivery third trimester 2019     Past Surgical History:        Procedure Laterality Date    WISDOM TOOTH EXTRACTION       Social History:    TOBACCO:   reports that she has never smoked. She has quit using smokeless tobacco.  ETOH:   reports no history of alcohol use. DRUGS:   reports no history of drug use. SEXUAL HISTORY:    DOMESTIC VIOLENCE:  no  ACTIVITIES OF DAILY LIVING:    INSTRUMENTAL ACTIVITIES OF DAILY LIVING:  Patient is able to perform all instrumental activities of daily living. MARITAL STATUS:    OCCUPATION:    LEVEL OF EDUCATION:    Patient currently lives with family   Environmental/chemical exposure:  None  Travel History:  None  Pets:  None  Family History:       Problem Relation Age of Onset    Cancer Mother     Arthritis Father     High Blood Pressure Father     Heart Disease Father     Heart Disease Maternal Grandmother     Diabetes Paternal Grandfather      Medications Prior to Admission:  Medications Prior to Admission: Prenatal Vit w/Bi-Zgjgcpmfn-UI (PNV PO), Take 1 tablet by mouth daily  ondansetron (ZOFRAN) 8 MG tablet, Take 1 tablet by mouth every 8 hours as needed for Nausea or Vomiting  ergocalciferol (ERGOCALCIFEROL) 1.25 MG (90888 UT) capsule, Take by mouth  Allergies:  Patient has no known allergies. REVIEW OF SYSTEMS:    Patient has no history of depression.   Patient has no symptoms of depression  CONSTITUTIONAL:  negative  EYES:  negative  HEENT:  negative  RESPIRATORY:  negative  CARDIOVASCULAR:  negative  GASTROINTESTINAL:  negative  GENITOURINARY:  negative  INTEGUMENT/BREAST:  negative  HEMATOLOGIC/LYMPHATIC:  negative  ALLERGIC/IMMUNOLOGIC:  negative  ENDOCRINE:  negative  MUSCULOSKELETAL:  negative  NEUROLOGICAL:  negative  BEHAVIOR/PSYCH:  negative    PHYSICAL EXAM:    General appearance:  awake, alert, cooperative, no apparent distress, and appears stated age  Neurologic:  Awake, alert, oriented to name, place and time. Cranial nerves II-XII are grossly intact. Motor is 5 out of 5 bilaterally. Cerebellar finger to nose, heel to shin intact. Sensory is intact.   Babinski down going, Romberg negative, and gait is normal.  Lungs:  No increased work of breathing, good air exchange, clear to auscultation bilaterally, no crackles or wheezing  Heart:  Normal apical impulse, regular rate and rhythm, normal S1 and S2, no S3 or S4, and no murmur noted  Abdomen:  No scars, normal bowel sounds, soft, non-distended, non-tender, no masses palpated, no hepatosplenomegally  Fetal heart rate:  Baseline Heart Rate 150, accelerations:  present  Pelvis:  External Genitalia: General appearance; normal, Hair distribution; normal, Lesions absent  Cervix:    DILATION:  3 cm  EFFACEMENT:   50%  STATION:  -3 cm  CONSISTENCY:  medium  POSITION:  mid      Contraction frequency:  0 minutes  Membranes:  Intact    Ultrasound:    General Labs:   Admission on 02/27/2021   Component Date Value Ref Range Status    Color, UA 02/27/2021 Yellow  Straw/Yellow Final    Clarity, UA 02/27/2021 CLOUDY* Clear Final    Glucose, Ur 02/27/2021 Negative  Negative mg/dL Final    Bilirubin Urine 02/27/2021 Negative  Negative Final    Ketones, Urine 02/27/2021 Negative  Negative mg/dL Final    Specific Gravity, UA 02/27/2021 1.010  1.005 - 1.030 Final    Blood, Urine 02/27/2021 Negative  Negative Final    pH, UA 02/27/2021 7.0  5.0 - 9.0 Final    Protein, UA 02/27/2021 Negative  Negative mg/dL Final    Urobilinogen, Urine 02/27/2021 0.2  <2.0 E.U./dL Final    Nitrite, Urine 02/27/2021 Negative  Negative Final    Leukocyte Esterase, Urine 02/27/2021 TRACE* Negative Final    Amphetamine Screen, Urine 02/27/2021 Neg  Negative <1000 ng/mL Final    Barbiturate Screen, Ur 02/27/2021 Neg  Negative < 200 ng/mL Final    Benzodiazepine Screen, Urine 02/27/2021 Neg  Negative < 200 ng/mL Final    Cannabinoid Scrn, Ur 02/27/2021 Neg  Negative < 50 ng/mL Final    Cocaine Metabolite Screen, Urine 02/27/2021 Neg  Negative < 300 ng/mL Final    Opiate Scrn, Ur 02/27/2021 Neg  Negative < 300 ng/mL Final    PCP Screen, Urine 02/27/2021 Neg  Negative < 25 ng/mL Final    Methadone Screen, Urine 02/27/2021 Neg  Negative <300 ng/mL Final    Propoxyphene Scrn, Ur 02/27/2021 Neg  Negative <300 ng/mL Final    Oxycodone Urine 02/27/2021 Neg  Negative <100 ng/mL Final    Drug Screen Comment: 02/27/2021 see below   Final    Comment: This method is a screening test to detect only these drug  classes as part of a medical workup. Confirmatory testing  by another method should be ordered if clinically indicated.       Bacteria, UA 02/27/2021 RARE* Negative /HPF Final    Hyaline Casts, UA 02/27/2021 1-3  0 - 5 /HPF Final    WBC, UA 02/27/2021 6-9* 0 - 5 /HPF Final    RBC, UA 02/27/2021 3-5* 0 - 5 /HPF Final    Epithelial Cells, UA 02/27/2021 6-10  0 - 5 /HPF Final    ABO/Rh 02/27/2021 A POS   Final    Antibody Screen 02/27/2021 NEG   Final    WBC 02/27/2021 8.8  4.8 - 10.8 K/uL Final    RBC 02/27/2021 4.78  4.20 - 5.40 M/uL Final    Hemoglobin 02/27/2021 10.6* 12.0 - 16.0 g/dL Final    Hematocrit 02/27/2021 33.8* 37.0 - 47.0 % Final    MCV 02/27/2021 70.8* 82.0 - 100.0 fL Final    MCH 02/27/2021 22.2* 27.0 - 31.3 pg Final    MCHC 02/27/2021 31.4* 33.0 - 37.0 % Final    RDW 02/27/2021 16.9* 11.5 - 14.5 % Final    Platelets 02/36/2442 352  130 - 400 K/uL Final    PLATELET SLIDE REVIEW 02/27/2021 Normal   Final    Neutrophils % 02/27/2021 74.1  % Final    Lymphocytes % 02/27/2021 18.3  % Final    Monocytes % 02/27/2021 6.6  % Final    Eosinophils % 02/27/2021 0.8  % Final    Basophils % 02/27/2021 0.2  % Final    Neutrophils Absolute 02/27/2021 6.5  1.4 - 6.5 K/uL Final    Lymphocytes Absolute 02/27/2021 1.6  1.0 - 4.8 K/uL Final    Monocytes Absolute 02/27/2021 0.6  0.2 - 0.8 K/uL Final    Eosinophils Absolute 02/27/2021 0.1  0.0 - 0.7 K/uL Final    Basophils Absolute 02/27/2021 0.0  0.0 - 0.2 K/uL Final    Anisocytosis 02/27/2021 1+   Final    Microcytes 02/27/2021 1+   Final    Poikilocytes 02/27/2021 1+   Final    Ovalocytes 02/27/2021 1+   Final    Hep B S Ag Interp 02/27/2021 Non-reactive   Final    Sodium 02/27/2021 129* 135 - 144 mEq/L Final    Potassium 02/27/2021 4.2  3.4 - 4.9 mEq/L Final    Chloride 02/27/2021 98  95 - 107 mEq/L Final    CO2 02/27/2021 23  20 - 31 mEq/L Final    Anion Gap 02/27/2021 8* 9 - 15 mEq/L Final    Glucose 02/27/2021 86  70 - 99 mg/dL Final    BUN 02/27/2021 13  6 - 20 mg/dL Final    CREATININE 02/27/2021 0.54  0.50 - 0.90 mg/dL Final    GFR Non- 02/27/2021 >60.0  >60 Final    Comment: >60 mL/min/1.73m2 EGFR, calc. for ages 25 and older using the  MDRD formula (not corrected for weight), is valid for stable  renal function.  GFR  02/27/2021 >60.0  >60 Final    Comment: >60 mL/min/1.73m2 EGFR, calc. for ages 25 and older using the  MDRD formula (not corrected for weight), is valid for stable  renal function.  Calcium 02/27/2021 9.4  8.5 - 9.9 mg/dL Final    Total Protein 02/27/2021 6.8  6.3 - 8.0 g/dL Final    Albumin 02/27/2021 3.4* 3.5 - 4.6 g/dL Final    Total Bilirubin 02/27/2021 <0.2  0.2 - 0.7 mg/dL Final    Alkaline Phosphatase 02/27/2021 134* 40 - 130 U/L Final    ALT 02/27/2021 8  0 - 33 U/L Final    AST 02/27/2021 12  0 - 35 U/L Final    Globulin 02/27/2021 3.4  2.3 - 3.5 g/dL Final    Uric Acid, Serum 02/27/2021 3.6  2.4 - 5.7 mg/dL Final    SARS-CoV-2, NAAT 02/27/2021 Not Detected  Not Detected Final    Comment: Rapid NAAT:   Negative results should be treated as presumptive and,  if inconsistent with clinical signs and symptoms or necessary for  patient management, should be tested with an alternative molecular  assay.

## 2021-02-27 NOTE — PLAN OF CARE
Problem: Anxiety:  Goal: Level of anxiety will decrease  Description: Level of anxiety will decrease  Outcome: Ongoing     Problem: Breathing Pattern - Ineffective:  Goal: Able to breathe comfortably  Description: Able to breathe comfortably  Outcome: Ongoing     Problem: Pain - Acute:  Goal: Pain level will decrease  Description: Pain level will decrease  Outcome: Ongoing  Goal: Able to cope with pain  Description: Able to cope with pain  Outcome: Ongoing

## 2021-02-27 NOTE — ANESTHESIA PROCEDURE NOTES
Epidural Block    Patient location during procedure: OB  Start time: 2/27/2021 4:42 PM  End time: 2/27/2021 4:47 PM  Reason for block: labor epidural  Staffing  Performed: resident/CRNA   Resident/CRNA: ANNEL Reynoso - CRNA  Preanesthetic Checklist  Completed: patient identified, IV checked, site marked, risks and benefits discussed, surgical consent, monitors and equipment checked, pre-op evaluation, timeout performed, anesthesia consent given, oxygen available and patient being monitored  Epidural  Patient position: sitting  Prep: Betadine and site prepped and draped  Patient monitoring: continuous pulse ox and frequent blood pressure checks  Approach: midline  Location: lumbar (1-5)  Injection technique: ROSANNA air  Provider prep: mask and sterile gloves  Needle  Needle type: Tuohy   Needle gauge: 17 G  Needle length: 3.5 in  Needle insertion depth: 7 cm  Catheter type: side hole  Catheter size: 20g.   Catheter at skin depth: 13 cm  Test dose: negative  Kit: perifx CEA Tray  Lot number: [id number]  Expiration date: 11/1/2021  Assessment  Sensory level: T8  Hemodynamics: stable  Attempts: 1  Additional Notes  FHR stable, Pt VSS, Pt has adequate block, comfortable

## 2021-02-27 NOTE — ANESTHESIA PRE PROCEDURE
Department of Anesthesiology  Preprocedure Note       Name:  Lien Caruso   Age:  23 y.o.  :  2000                                          MRN:  [id number]         Date:  2021      Surgeon: * No surgeons listed *    Procedure: * No procedures listed *    Medications prior to admission:   Prior to Admission medications    Medication Sig Start Date End Date Taking?  Authorizing Provider   Prenatal Vit w/Fd-Npwqqxsbx-HW (PNV PO) Take 1 tablet by mouth daily   Yes Historical Provider, MD   ondansetron (ZOFRAN) 8 MG tablet Take 1 tablet by mouth every 8 hours as needed for Nausea or Vomiting 21   Viviane Adorno DO   ergocalciferol (ERGOCALCIFEROL) 1.25 MG (47964 UT) capsule Take by mouth 20   Historical Provider, MD       Current medications:    Current Facility-Administered Medications   Medication Dose Route Frequency Provider Last Rate Last Admin    miSOPROStol (CYTOTEC) pre-split tablet TABS 25 mcg  25 mcg Vaginal Q4H Anamaria Valerio, DO        oxytocin (PITOCIN) 30 units in 500 mL infusion  1 tay-units/min Intravenous Continuous Anamaria Valerio, DO        lactated ringers infusion   Intravenous Continuous Anamaria Valerio, DO        sodium chloride flush 0.9 % injection 10 mL  10 mL Intravenous 2 times per day Anamaria Valerio, DO        sodium chloride flush 0.9 % injection 10 mL  10 mL Intravenous PRN Anamaria Valerio, DO        nalbuphine (NUBAIN) injection 5 mg  5 mg Intramuscular Once PRN Anamaria Valerio, DO        And    nalbuphine (NUBAIN) injection 5 mg  5 mg Intravenous Once PRN Anamaria Valerio, DO        ondansetron [facility]) injection 4 mg  4 mg Intravenous Q6H PRN Anamaria Valerio, DO        diphenhydrAMINE (BENADRYL) tablet 25 mg  25 mg Oral Q4H PRN Anamaria Valerio, DO        acetaminophen (TYLENOL) tablet 650 mg  650 mg Oral Q4H PRN Anamaria Valerio, DO        benzocaine-menthol (DERMOPLAST) 20-0.5 % spray   Topical PRN Anamaria Valerio, DO        docusate sodium (COLACE) capsule 100 mg  100 mg Oral BID PRN Ramonita Mages, DO        penicillin G potassium 5 Million Units in dextrose 5 % 100 mL IVPB (mini-bag)  5 Million Units Intravenous Once Ramonita Mages, DO        Followed by   [facility] penicillin G potassium IVPB 3 Million Units  3 Million Units Intravenous Q4H Ramonita Mages, DO        fentaNYL 125 mcg, ropivacaine 0.2% in sodium chloride 0.9% 127.5ml (OB) epidural  12 mL/hr Epidural Continuous Mertie Leal, DO        naloxone [facility]) injection 0.4 mg  0.4 mg Intravenous PRN Mertie Leal, DO           Allergies:  No Known Allergies    Problem List:    Patient Active Problem List   Diagnosis Code    Obsessive-compulsive disorder F42.9    Migraine without aura and without status migrainosus, not intractable G43.009    Anxiety F41.9    Recurrent depression (HCC) F33.9    Class 1 obesity due to excess calories without serious comorbidity with body mass index (BMI) of 33.0 to 33.9 in adult E66.09, Z68.33    Dysfunctional uterine bleeding N93.8    Gastroesophageal reflux disease K21.9    Nausea and vomiting R11.2    Vitamin D deficiency E55.9    Right lower quadrant abdominal pain affecting pregnancy O26.899, R10.31    Normal labor O80, Z37.9       Past Medical History:        Diagnosis Date    Anxiety     BMI (body mass index), pediatric, 85% to less than 95% for age 2014    Chronic hypertension affecting pregnancy     Class 1 obesity due to excess calories without serious comorbidity with body mass index (BMI) of 33.0 to 33.9 in adult 2019    Depression     hx of depression    Generalized abdominal pain 2017    Headache(784.0)     History of depression 2013    Hypertension      labor third trimester with  delivery third trimester 2019       Past Surgical History:        Procedure Laterality Date    WISDOM TOOTH EXTRACTION         Social History:    Social History     Tobacco Use    Smoking status: Never Smoker    Smokeless tobacco: Former User   Substance Use Topics    Alcohol use: No     Alcohol/week: 0.0 standard drinks                                Counseling given: Not Answered      Vital Signs (Current):   Vitals:    02/27/21 0739 02/27/21 0810 02/27/21 0826 02/27/21 0840   BP: (!) 142/88 (!) 149/80 (!) 152/91 (!) 153/90   Pulse: 90 83 84 81   Resp: 18      Temp: 36.6 °C (97.9 °F)      TempSrc: Oral                                                 BP Readings from Last 3 Encounters:   02/27/21 (!) 153/90   02/19/21 122/86   02/11/21 122/86       NPO Status:                                                                                 BMI:   Wt Readings from Last 3 Encounters:   02/19/21 259 lb (117.5 kg)   02/11/21 252 lb (114.3 kg)   02/04/21 250 lb (113.4 kg)     There is no height or weight on file to calculate BMI.    CBC:   Lab Results   Component Value Date    WBC 7.4 08/28/2020    RBC 4.92 08/28/2020    RBC 4.45 07/03/2018    HGB 10.9 12/15/2020    HCT 33.5 12/15/2020    MCV 75.0 08/28/2020    RDW 17.3 08/28/2020     08/28/2020       CMP:   Lab Results   Component Value Date     02/08/2019    K 4.1 02/08/2019     02/08/2019    CO2 23 02/08/2019    BUN 9 02/08/2019    CREATININE 0.56 02/08/2019    GFRAA >60.0 02/08/2019    AGRATIO 1.6 07/03/2018    LABGLOM >60.0 02/08/2019    GLUCOSE 81 08/28/2020    GLUCOSE 78 07/03/2018    PROT 6.5 02/08/2019    CALCIUM 9.1 02/08/2019    BILITOT <0.2 02/08/2019    ALKPHOS 109 02/08/2019    AST 8 02/08/2019    ALT 9 02/08/2019       POC Tests: No results for input(s): POCGLU, POCNA, POCK, POCCL, POCBUN, POCHEMO, POCHCT in the last 72 hours.     Coags: No results found for: PROTIME, INR, APTT    HCG (If Applicable):   Lab Results   Component Value Date    PREGTESTUR positive 09/04/2018    PREGSERUM Positive 07/03/2018    HCGQUANT 65559 07/06/2018        ABGs: No results found for: PHART, PO2ART, OPM2UBO, EZE5CKA, BEART, J0OSSRZF

## 2021-02-27 NOTE — PROGRESS NOTES
Dr Álvarez Salvage informed of pts arrival to triage, c/o, BP's and SVE.  States to have in house manage as he will be unavailable for a while

## 2021-02-27 NOTE — PROGRESS NOTES
Arrives to triage for c/o dizziness that started this morning. Denies BP or blood sugars issues with pregnancy. Denies vaginal bleeding and LOF.  Reports fetal movement

## 2021-02-28 VITALS
HEART RATE: 81 BPM | TEMPERATURE: 97.8 F | OXYGEN SATURATION: 99 % | BODY MASS INDEX: 37.89 KG/M2 | DIASTOLIC BLOOD PRESSURE: 91 MMHG | SYSTOLIC BLOOD PRESSURE: 134 MMHG | RESPIRATION RATE: 18 BRPM | HEIGHT: 68 IN | WEIGHT: 250 LBS

## 2021-02-28 LAB
HCT VFR BLD CALC: 30.3 % (ref 37–47)
HEMOGLOBIN: 9.8 G/DL (ref 12–16)
MCH RBC QN AUTO: 22.8 PG (ref 27–31.3)
MCHC RBC AUTO-ENTMCNC: 32.2 % (ref 33–37)
MCV RBC AUTO: 70.9 FL (ref 82–100)
PDW BLD-RTO: 17.2 % (ref 11.5–14.5)
PLATELET # BLD: 313 K/UL (ref 130–400)
RBC # BLD: 4.27 M/UL (ref 4.2–5.4)
WBC # BLD: 11 K/UL (ref 4.8–10.8)

## 2021-02-28 PROCEDURE — 6370000000 HC RX 637 (ALT 250 FOR IP): Performed by: OBSTETRICS & GYNECOLOGY

## 2021-02-28 PROCEDURE — 7200000001 HC VAGINAL DELIVERY

## 2021-02-28 PROCEDURE — 99238 HOSP IP/OBS DSCHRG MGMT 30/<: CPT | Performed by: OBSTETRICS & GYNECOLOGY

## 2021-02-28 PROCEDURE — 85027 COMPLETE CBC AUTOMATED: CPT

## 2021-02-28 PROCEDURE — 36415 COLL VENOUS BLD VENIPUNCTURE: CPT

## 2021-02-28 RX ORDER — LABETALOL 200 MG/1
200 TABLET, FILM COATED ORAL EVERY 12 HOURS SCHEDULED
Qty: 60 TABLET | Refills: 3 | Status: SHIPPED | OUTPATIENT
Start: 2021-03-01 | End: 2021-06-07

## 2021-02-28 RX ORDER — LABETALOL 200 MG/1
200 TABLET, FILM COATED ORAL EVERY 12 HOURS SCHEDULED
Status: DISCONTINUED | OUTPATIENT
Start: 2021-02-28 | End: 2021-02-28 | Stop reason: HOSPADM

## 2021-02-28 RX ORDER — PNV NO.95/FERROUS FUM/FOLIC AC 28MG-0.8MG
1 TABLET ORAL 2 TIMES DAILY
Qty: 60 TABLET | Refills: 2 | Status: SHIPPED | OUTPATIENT
Start: 2021-02-28 | End: 2021-06-07

## 2021-02-28 RX ORDER — LABETALOL 200 MG/1
200 TABLET, FILM COATED ORAL 2 TIMES DAILY
Qty: 60 TABLET | Refills: 3 | Status: SHIPPED | OUTPATIENT
Start: 2021-02-28 | End: 2021-06-07

## 2021-02-28 RX ORDER — IBUPROFEN 600 MG/1
600 TABLET ORAL EVERY 6 HOURS PRN
Qty: 60 TABLET | Refills: 1 | Status: SHIPPED | OUTPATIENT
Start: 2021-02-28 | End: 2021-06-07

## 2021-02-28 RX ADMIN — LABETALOL HYDROCHLORIDE 200 MG: 200 TABLET, FILM COATED ORAL at 15:04

## 2021-02-28 RX ADMIN — ACETAMINOPHEN 650 MG: 325 TABLET ORAL at 14:29

## 2021-02-28 RX ADMIN — DOCUSATE SODIUM 100 MG: 100 CAPSULE, LIQUID FILLED ORAL at 08:25

## 2021-02-28 RX ADMIN — IBUPROFEN 600 MG: 600 TABLET, FILM COATED ORAL at 18:42

## 2021-02-28 RX ADMIN — IBUPROFEN 600 MG: 600 TABLET, FILM COATED ORAL at 08:25

## 2021-02-28 NOTE — FLOWSHEET NOTE
Dr. Rivera Sites called and notified of most recent blood pressure (see flowsheets). No new orders. States to call with bp 160/100.

## 2021-02-28 NOTE — DISCHARGE SUMMARY
Discharge summary       POSTPARTUM DAY 2  Pt is doing well. Pt is ambulating and tolerating diet.    plans to breastfeed, plans to bottle feed  Lochia wnl  well    /86   Pulse 88   Temp 98.3 °F (36.8 °C) (Oral)   Resp 16   Ht 5' 8\" (1.727 m)   Wt 250 lb (113.4 kg)   LMP 05/27/2020   SpO2 99%   Breastfeeding Unknown   BMI 38.01 kg/m²   GENERAL APPEARANCE: alert, well appearing, in no apparent distress  UTERUS POSTPARTUM: normal size, well involuted, firm, non-tender    Labs:       Results for orders placed or performed during the hospital encounter of 02/27/21   COVID-19, Rapid    Specimen: Nasopharyngeal Swab   Result Value Ref Range    SARS-CoV-2, NAAT Not Detected Not Detected   Urinalysis   Result Value Ref Range    Color, UA Yellow Straw/Yellow    Clarity, UA CLOUDY (A) Clear    Glucose, Ur Negative Negative mg/dL    Bilirubin Urine Negative Negative    Ketones, Urine Negative Negative mg/dL    Specific Gravity, UA 1.010 1.005 - 1.030    Blood, Urine Negative Negative    pH, UA 7.0 5.0 - 9.0    Protein, UA Negative Negative mg/dL    Urobilinogen, Urine 0.2 <2.0 E.U./dL    Nitrite, Urine Negative Negative    Leukocyte Esterase, Urine TRACE (A) Negative   DRUG SCREEN MULTI URINE   Result Value Ref Range    Amphetamine Screen, Urine Neg Negative <1000 ng/mL    Barbiturate Screen, Ur Neg Negative < 200 ng/mL    Benzodiazepine Screen, Urine Neg Negative < 200 ng/mL    Cannabinoid Scrn, Ur Neg Negative < 50 ng/mL    Cocaine Metabolite Screen, Urine Neg Negative < 300 ng/mL    Opiate Scrn, Ur Neg Negative < 300 ng/mL    PCP Screen, Urine Neg Negative < 25 ng/mL    Methadone Screen, Urine Neg Negative <300 ng/mL    Propoxyphene Scrn, Ur Neg Negative <300 ng/mL    Oxycodone Urine Neg Negative <100 ng/mL    Drug Screen Comment: see below    Microscopic Urinalysis   Result Value Ref Range    Bacteria, UA RARE (A) Negative /HPF    Hyaline Casts, UA 1-3 0 - 5 /HPF    WBC, UA 6-9 (A) 0 - 5 /HPF    RBC, UA 3-5 (A) 0 - 5 /HPF    Epithelial Cells, UA 6-10 0 - 5 /HPF   CBC auto differential   Result Value Ref Range    WBC 8.8 4.8 - 10.8 K/uL    RBC 4.78 4.20 - 5.40 M/uL    Hemoglobin 10.6 (L) 12.0 - 16.0 g/dL    Hematocrit 33.8 (L) 37.0 - 47.0 %    MCV 70.8 (L) 82.0 - 100.0 fL    MCH 22.2 (L) 27.0 - 31.3 pg    MCHC 31.4 (L) 33.0 - 37.0 %    RDW 16.9 (H) 11.5 - 14.5 %    Platelets 891 370 - 241 K/uL    PLATELET SLIDE REVIEW Normal     Neutrophils % 74.1 %    Lymphocytes % 18.3 %    Monocytes % 6.6 %    Eosinophils % 0.8 %    Basophils % 0.2 %    Neutrophils Absolute 6.5 1.4 - 6.5 K/uL    Lymphocytes Absolute 1.6 1.0 - 4.8 K/uL    Monocytes Absolute 0.6 0.2 - 0.8 K/uL    Eosinophils Absolute 0.1 0.0 - 0.7 K/uL    Basophils Absolute 0.0 0.0 - 0.2 K/uL    Anisocytosis 1+     Microcytes 1+     Poikilocytes 1+     Ovalocytes 1+    Hepatitis B surface antigen   Result Value Ref Range    Hep B S Ag Interp Non-reactive    Comprehensive metabolic panel   Result Value Ref Range    Sodium 129 (L) 135 - 144 mEq/L    Potassium 4.2 3.4 - 4.9 mEq/L    Chloride 98 95 - 107 mEq/L    CO2 23 20 - 31 mEq/L    Anion Gap 8 (L) 9 - 15 mEq/L    Glucose 86 70 - 99 mg/dL    BUN 13 6 - 20 mg/dL    CREATININE 0.54 0.50 - 0.90 mg/dL    GFR Non-African American >60.0 >60    GFR  >60.0 >60    Calcium 9.4 8.5 - 9.9 mg/dL    Total Protein 6.8 6.3 - 8.0 g/dL    Albumin 3.4 (L) 3.5 - 4.6 g/dL    Total Bilirubin <0.2 0.2 - 0.7 mg/dL    Alkaline Phosphatase 134 (H) 40 - 130 U/L    ALT 8 0 - 33 U/L    AST 12 0 - 35 U/L    Globulin 3.4 2.3 - 3.5 g/dL   Uric Acid   Result Value Ref Range    Uric Acid, Serum 3.6 2.4 - 5.7 mg/dL   RPR Reflex to Titer and TPPA   Result Value Ref Range    RPR Non-reactive Non-reactive   CBC   Result Value Ref Range    WBC 11.0 (H) 4.8 - 10.8 K/uL    RBC 4.27 4.20 - 5.40 M/uL    Hemoglobin 9.8 (L) 12.0 - 16.0 g/dL    Hematocrit 30.3 (L) 37.0 - 47.0 %    MCV 70.9 (L) 82.0 - 100.0 fL    MCH 22.8 (L) 27.0 - 31.3 pg MCHC 32.2 (L) 33.0 - 37.0 %    RDW 17.2 (H) 11.5 - 14.5 %    Platelets 215 759 - 654 K/uL   TYPE AND SCREEN   Result Value Ref Range    ABO/Rh A POS     Antibody Screen NEG            23 y.o. [id number] now P s/p vaginal delivery doing well PPD#2  1. Routine postpartum care  normal postpartum exam  See orders and Patient Instructions    2. Pre-eclampsia / Postpartum hypertension     Started on labetalol 200mg po BID   Needs to follow for BP check in 1 week        Medication List      START taking these medications    Iron 325 (65 Fe) MG Tabs  Take 1 tablet by mouth 2 times daily        CONTINUE taking these medications    ergocalciferol 1.25 MG (50912 UT) capsule  Commonly known as: ERGOCALCIFEROL     ondansetron 8 MG tablet  Commonly known as: ZOFRAN  Take 1 tablet by mouth every 8 hours as needed for Nausea or Vomiting     PNV PO        ASK your doctor about these medications    * ibuprofen 100 MG/5ML suspension  Commonly known as: Childrens Advil  Take 30 mLs by mouth once for 1 dose. Ask about: Which instructions should I use? * ibuprofen 600 MG tablet  Commonly known as: ADVIL;MOTRIN  Take 1 tablet by mouth every 6 hours as needed for Pain  Ask about: Which instructions should I use? * This list has 2 medication(s) that are the same as other medications prescribed for you. Read the directions carefully, and ask your doctor or other care provider to review them with you. Where to Get Your Medications      These medications were sent to [address] [place] [address] [phone]  [address] [place] [id number]    Phone: [phone]   · ibuprofen 600 MG tablet  · Iron 325 (65 Fe) MG Tabs     Information about where to get these medications is not yet available    Ask your nurse or doctor about these medications  · ibuprofen 100 MG/5ML suspension         Grant Lora M.D., F.A.C.O. G

## 2021-02-28 NOTE — ANESTHESIA POSTPROCEDURE EVALUATION
Department of Anesthesiology  Postprocedure Note    Patient: Og Ohara  MRN: [id number]  YOB: 2000  Date of evaluation: 2/27/2021  Time:  7:46 PM     Procedure Summary     Date: 02/27/21 Room / Location:     Anesthesia Start: 1642 Anesthesia Stop: 1836    Procedure: Labor Analgesia Diagnosis:     Scheduled Providers:  Responsible Provider: ANNEL Hernandez CRNA    Anesthesia Type: epidural ASA Status: 2          Anesthesia Type: epidural    Leodan Phase I: Leodan Score: 10    Leodan Phase II: Leodan Score: 10    Last vitals: Reviewed and per EMR flowsheets.        Anesthesia Post Evaluation    Patient location during evaluation: bedside  Patient participation: complete - patient participated  Level of consciousness: awake and alert  Nausea & Vomiting: no nausea and no vomiting  Complications: no  Cardiovascular status: hemodynamically stable  Respiratory status: acceptable  Hydration status: stable

## 2021-02-28 NOTE — FLOWSHEET NOTE
Dr. Yumiko Hawkins called and notified of increased blood pressures (see flowsheets). No new orders given at this time.

## 2021-02-28 NOTE — FLOWSHEET NOTE
Dr. Mitchel Nguyen called and notified of pt's bp (see flowsheets) and headache. New orders received.

## 2021-02-28 NOTE — PROGRESS NOTES
PROGRESS NOTE     POSTPARTUM DAY 2  Pt is doing well. Pt is ambulating and tolerating diet.    plans to breastfeed, plans to bottle feed  Lochia wnl  well    /86   Pulse 88   Temp 98.3 °F (36.8 °C) (Oral)   Resp 16   Ht 5' 8\" (1.727 m)   Wt 250 lb (113.4 kg)   LMP 05/27/2020   SpO2 99%   Breastfeeding Unknown   BMI 38.01 kg/m²   GENERAL APPEARANCE: alert, well appearing, in no apparent distress  UTERUS POSTPARTUM: normal size, well involuted, firm, non-tender    Labs:       Results for orders placed or performed during the hospital encounter of 02/27/21   COVID-19, Rapid    Specimen: Nasopharyngeal Swab   Result Value Ref Range    SARS-CoV-2, NAAT Not Detected Not Detected   Urinalysis   Result Value Ref Range    Color, UA Yellow Straw/Yellow    Clarity, UA CLOUDY (A) Clear    Glucose, Ur Negative Negative mg/dL    Bilirubin Urine Negative Negative    Ketones, Urine Negative Negative mg/dL    Specific Gravity, UA 1.010 1.005 - 1.030    Blood, Urine Negative Negative    pH, UA 7.0 5.0 - 9.0    Protein, UA Negative Negative mg/dL    Urobilinogen, Urine 0.2 <2.0 E.U./dL    Nitrite, Urine Negative Negative    Leukocyte Esterase, Urine TRACE (A) Negative   DRUG SCREEN MULTI URINE   Result Value Ref Range    Amphetamine Screen, Urine Neg Negative <1000 ng/mL    Barbiturate Screen, Ur Neg Negative < 200 ng/mL    Benzodiazepine Screen, Urine Neg Negative < 200 ng/mL    Cannabinoid Scrn, Ur Neg Negative < 50 ng/mL    Cocaine Metabolite Screen, Urine Neg Negative < 300 ng/mL    Opiate Scrn, Ur Neg Negative < 300 ng/mL    PCP Screen, Urine Neg Negative < 25 ng/mL    Methadone Screen, Urine Neg Negative <300 ng/mL    Propoxyphene Scrn, Ur Neg Negative <300 ng/mL    Oxycodone Urine Neg Negative <100 ng/mL    Drug Screen Comment: see below    Microscopic Urinalysis   Result Value Ref Range    Bacteria, UA RARE (A) Negative /HPF    Hyaline Casts, UA 1-3 0 - 5 /HPF    WBC, UA 6-9 (A) 0 - 5 /HPF    RBC, UA 3-5 (A) 0 - 5 /HPF    Epithelial Cells, UA 6-10 0 - 5 /HPF   CBC auto differential   Result Value Ref Range    WBC 8.8 4.8 - 10.8 K/uL    RBC 4.78 4.20 - 5.40 M/uL    Hemoglobin 10.6 (L) 12.0 - 16.0 g/dL    Hematocrit 33.8 (L) 37.0 - 47.0 %    MCV 70.8 (L) 82.0 - 100.0 fL    MCH 22.2 (L) 27.0 - 31.3 pg    MCHC 31.4 (L) 33.0 - 37.0 %    RDW 16.9 (H) 11.5 - 14.5 %    Platelets 910 523 - 229 K/uL    PLATELET SLIDE REVIEW Normal     Neutrophils % 74.1 %    Lymphocytes % 18.3 %    Monocytes % 6.6 %    Eosinophils % 0.8 %    Basophils % 0.2 %    Neutrophils Absolute 6.5 1.4 - 6.5 K/uL    Lymphocytes Absolute 1.6 1.0 - 4.8 K/uL    Monocytes Absolute 0.6 0.2 - 0.8 K/uL    Eosinophils Absolute 0.1 0.0 - 0.7 K/uL    Basophils Absolute 0.0 0.0 - 0.2 K/uL    Anisocytosis 1+     Microcytes 1+     Poikilocytes 1+     Ovalocytes 1+    Hepatitis B surface antigen   Result Value Ref Range    Hep B S Ag Interp Non-reactive    Comprehensive metabolic panel   Result Value Ref Range    Sodium 129 (L) 135 - 144 mEq/L    Potassium 4.2 3.4 - 4.9 mEq/L    Chloride 98 95 - 107 mEq/L    CO2 23 20 - 31 mEq/L    Anion Gap 8 (L) 9 - 15 mEq/L    Glucose 86 70 - 99 mg/dL    BUN 13 6 - 20 mg/dL    CREATININE 0.54 0.50 - 0.90 mg/dL    GFR Non-African American >60.0 >60    GFR  >60.0 >60    Calcium 9.4 8.5 - 9.9 mg/dL    Total Protein 6.8 6.3 - 8.0 g/dL    Albumin 3.4 (L) 3.5 - 4.6 g/dL    Total Bilirubin <0.2 0.2 - 0.7 mg/dL    Alkaline Phosphatase 134 (H) 40 - 130 U/L    ALT 8 0 - 33 U/L    AST 12 0 - 35 U/L    Globulin 3.4 2.3 - 3.5 g/dL   Uric Acid   Result Value Ref Range    Uric Acid, Serum 3.6 2.4 - 5.7 mg/dL   RPR Reflex to Titer and TPPA   Result Value Ref Range    RPR Non-reactive Non-reactive   CBC   Result Value Ref Range    WBC 11.0 (H) 4.8 - 10.8 K/uL    RBC 4.27 4.20 - 5.40 M/uL    Hemoglobin 9.8 (L) 12.0 - 16.0 g/dL    Hematocrit 30.3 (L) 37.0 - 47.0 %    MCV 70.9 (L) 82.0 - 100.0 fL    MCH 22.8 (L) 27.0 - 31.3 pg MCHC 32.2 (L) 33.0 - 37.0 %    RDW 17.2 (H) 11.5 - 14.5 %    Platelets 745 785 - 488 K/uL   TYPE AND SCREEN   Result Value Ref Range    ABO/Rh A POS     Antibody Screen NEG            23 y.o. [id number] now P s/p vaginal delivery doing well PPD#1  1. Routine postpartum care  normal postpartum exam  See orders and Patient Instructions    2. Pre-eclampsia / Postpartum hypertension     Started on labetalol 200mg po BID   Needs to follow for BP check in 1 week       Courtney Hoffmann M.D., F.MARLO.ARNALDO.O. G

## 2021-02-28 NOTE — L&D DELIVERY NOTE
2       Muscle Tone: 2  2       Respiratory Effort: 2  2       Total: 9  9               Apgars Assigned By: Fede Conrad RN      Measurements       Delivery Information    Episiotomy: None  Perineal lacerations: 2nd Repaired: Yes   Vaginal delivery est. blood loss (mL): 50  Surgical or additional est. blood loss (mL): 0 (View Only): Edit in Flowsheets   Combined est. blood loss (mL): 50     Vaginal Delivery Counts    Initial count personnel: CAD  Initial count verified by: ARLENE   4x4:  Needles:  Instruments:  Lap Pads:  Sponges:    Initial counts:         Final counts:         Final count personnel: DR Jeffery Tadeo  Final count verified by: KS  Accurate final count?: Yes  Final vaginal sweep completed: Yes     Other Procedures    Procedures: None

## 2021-03-01 NOTE — FLOWSHEET NOTE
Written and verbal discharge instructions given to patient, post birth warning signs paper discussed. No questions or concerns at this time.

## 2021-06-07 ENCOUNTER — OFFICE VISIT (OUTPATIENT)
Dept: OBGYN CLINIC | Age: 21
End: 2021-06-07
Payer: COMMERCIAL

## 2021-06-07 VITALS
HEIGHT: 68 IN | WEIGHT: 245 LBS | BODY MASS INDEX: 37.13 KG/M2 | DIASTOLIC BLOOD PRESSURE: 72 MMHG | SYSTOLIC BLOOD PRESSURE: 122 MMHG

## 2021-06-07 DIAGNOSIS — Z11.3 ROUTINE SCREENING FOR STI (SEXUALLY TRANSMITTED INFECTION): ICD-10-CM

## 2021-06-07 DIAGNOSIS — Z32.01 POSITIVE URINE PREGNANCY TEST: ICD-10-CM

## 2021-06-07 DIAGNOSIS — N91.2 AMENORRHEA: Primary | ICD-10-CM

## 2021-06-07 DIAGNOSIS — N91.2 AMENORRHEA: ICD-10-CM

## 2021-06-07 DIAGNOSIS — Z32.01 PREGNANCY TEST POSITIVE: ICD-10-CM

## 2021-06-07 LAB — GONADOTROPIN, CHORIONIC (HCG) QUANT: 8875 MIU/ML

## 2021-06-07 PROCEDURE — G8428 CUR MEDS NOT DOCUMENT: HCPCS | Performed by: OBSTETRICS & GYNECOLOGY

## 2021-06-07 PROCEDURE — 1036F TOBACCO NON-USER: CPT | Performed by: OBSTETRICS & GYNECOLOGY

## 2021-06-07 PROCEDURE — 81025 URINE PREGNANCY TEST: CPT | Performed by: OBSTETRICS & GYNECOLOGY

## 2021-06-07 PROCEDURE — G8417 CALC BMI ABV UP PARAM F/U: HCPCS | Performed by: OBSTETRICS & GYNECOLOGY

## 2021-06-07 PROCEDURE — 99213 OFFICE O/P EST LOW 20 MIN: CPT | Performed by: OBSTETRICS & GYNECOLOGY

## 2021-06-09 LAB — URINE CULTURE, ROUTINE: NORMAL

## 2021-06-10 LAB
C. TRACHOMATIS DNA ,URINE: NEGATIVE
N. GONORRHOEAE DNA, URINE: NEGATIVE

## 2021-06-11 ENCOUNTER — HOSPITAL ENCOUNTER (OUTPATIENT)
Dept: ULTRASOUND IMAGING | Age: 21
Discharge: HOME | End: 2021-06-13
Payer: COMMERCIAL

## 2021-06-11 DIAGNOSIS — N91.2 AMENORRHEA: ICD-10-CM

## 2021-06-11 DIAGNOSIS — O20.0 THREATENED MISCARRIAGE: Primary | ICD-10-CM

## 2021-06-11 LAB
6-ACETYLMORPHINE: NOT DETECTED
7-AMINOCLONAZEPAM: NOT DETECTED
ALPHA-OH-ALPRAZOLAM: NOT DETECTED
ALPHA-OH-MIDAZOLAM, URINE: NOT DETECTED
ALPRAZOLAM: NOT DETECTED
AMPHETAMINE: NOT DETECTED
BARBITURATES: NOT DETECTED
BENZOYLECGONINE: NOT DETECTED
BUPRENORPHINE: NOT DETECTED
CARISOPRODOL: NOT DETECTED
CLONAZEPAM: NOT DETECTED
CODEINE: NOT DETECTED
CREATININE URINE: 180.6 MG/DL (ref 20–400)
DIAZEPAM: NOT DETECTED
EER PAIN MGT DRUG PANEL, HIGH RES/EMIT U: NORMAL
ETHYL GLUCURONIDE: NOT DETECTED
FENTANYL: NOT DETECTED
GABAPENTIN: NOT DETECTED
HYDROCODONE: NOT DETECTED
HYDROMORPHONE: NOT DETECTED
LORAZEPAM: NOT DETECTED
MARIJUANA METABOLITE: NOT DETECTED
MDA: NOT DETECTED
MDEA: NOT DETECTED
MDMA URINE: NOT DETECTED
MEPERIDINE: NOT DETECTED
METHADONE: NOT DETECTED
METHAMPHETAMINE: NOT DETECTED
METHYLPHENIDATE: NOT DETECTED
MIDAZOLAM: NOT DETECTED
MORPHINE: NOT DETECTED
NALOXONE: NOT DETECTED
NORBUPRENORPHINE, FREE: NOT DETECTED
NORDIAZEPAM: NOT DETECTED
NORFENTANYL: NOT DETECTED
NORHYDROCODONE, URINE: NOT DETECTED
NOROXYCODONE: NOT DETECTED
NOROXYMORPHONE, URINE: NOT DETECTED
OXAZEPAM: NOT DETECTED
OXYCODONE: NOT DETECTED
OXYMORPHONE: NOT DETECTED
PAIN MANAGEMENT DRUG PANEL: NORMAL
PCP: NOT DETECTED
PHENTERMINE: NOT DETECTED
PREGABALIN: NOT DETECTED
SPECIMEN SOURCE: NORMAL
T. VAGINALIS AMPLIFIED: NEGATIVE
TAPENTADOL, URINE: NOT DETECTED
TAPENTADOL-O-SULFATE, URINE: NOT DETECTED
TEMAZEPAM: NOT DETECTED
TRAMADOL: NOT DETECTED
ZOLPIDEM: NOT DETECTED

## 2021-06-11 PROCEDURE — 76817 TRANSVAGINAL US OBSTETRIC: CPT

## 2021-06-11 PROCEDURE — 76801 OB US < 14 WKS SINGLE FETUS: CPT

## 2021-06-11 NOTE — PROGRESS NOTES
Subjective:  Telephone report received from [facility] Radiology:    Objective:  6/11/21 Dating Ultrasound:  6w 0d, EDC 2/4/22. Single gestation, IUP.   FHR 117bpm.  Unusually thick, non-homogenous endometrium    Assessment:  Increased probability of spontaneous miscarriage    Plan:  Repeat US in 10 days

## 2021-06-23 ENCOUNTER — HOSPITAL ENCOUNTER (OUTPATIENT)
Dept: ULTRASOUND IMAGING | Age: 21
Discharge: HOME | End: 2021-06-25
Payer: COMMERCIAL

## 2021-06-23 DIAGNOSIS — O20.0 THREATENED MISCARRIAGE: ICD-10-CM

## 2021-06-23 DIAGNOSIS — O02.1 MISSED ABORTION: Primary | ICD-10-CM

## 2021-06-23 PROCEDURE — 76817 TRANSVAGINAL US OBSTETRIC: CPT

## 2021-06-23 PROCEDURE — 76801 OB US < 14 WKS SINGLE FETUS: CPT

## 2021-06-23 NOTE — Clinical Note
See notes - US confirmed pregnancy loss, FHT no longer present.   Please schedule her to follow-up with Dr. Connors Reas

## 2021-06-23 NOTE — PROGRESS NOTES
SUBJECTIVE:  Report received from Shawn Huber 66 previously identified, now no longer present. Discussed US results briefly with patient over the phone, notified her of absence of FHR.     OBJECTIVE:  19 Ultrasound:  7w 0d, FHR absent      ASSESSMENT:  Missed     PLAN:  Follow-up with Dr. Humza Holt, [address] [place] [address]

## 2021-06-24 ENCOUNTER — PATIENT MESSAGE (OUTPATIENT)
Dept: OBGYN CLINIC | Age: 21
End: 2021-06-24

## 2021-06-24 NOTE — TELEPHONE ENCOUNTER
From: Pooja Mosqueda  To: Meseret Bermeo MD  Sent: 6/24/2021 11:15 AM EDT  Subject: Test Results Question    The results I got shows 2 miscarriages, are they considering this a miscarriage? I have not had bleeding. She said she didn't detect a heartbeat, are they going to do blood work or more test to confirm I'm having or had a miscarriage? Thank you.

## 2021-06-27 ENCOUNTER — HOSPITAL ENCOUNTER (EMERGENCY)
Age: 21
Discharge: HOME | End: 2021-06-27
Attending: FAMILY MEDICINE
Payer: COMMERCIAL

## 2021-06-27 ENCOUNTER — APPOINTMENT (OUTPATIENT)
Dept: ULTRASOUND IMAGING | Age: 21
End: 2021-06-27
Payer: COMMERCIAL

## 2021-06-27 VITALS
OXYGEN SATURATION: 99 % | HEIGHT: 67 IN | TEMPERATURE: 97.6 F | BODY MASS INDEX: 34.53 KG/M2 | RESPIRATION RATE: 17 BRPM | HEART RATE: 93 BPM | DIASTOLIC BLOOD PRESSURE: 81 MMHG | WEIGHT: 220 LBS | SYSTOLIC BLOOD PRESSURE: 120 MMHG

## 2021-06-27 DIAGNOSIS — O03.9 FETAL DEMISE DUE TO MISCARRIAGE: Primary | ICD-10-CM

## 2021-06-27 LAB
ALBUMIN SERPL-MCNC: 4.1 G/DL (ref 3.5–4.6)
ALP BLD-CCNC: 63 U/L (ref 40–130)
ALT SERPL-CCNC: 18 U/L (ref 0–33)
ANION GAP SERPL CALCULATED.3IONS-SCNC: 11 MEQ/L (ref 9–15)
ANISOCYTOSIS: ABNORMAL
AST SERPL-CCNC: 18 U/L (ref 0–35)
BACTERIA: ABNORMAL /HPF
BASOPHILS ABSOLUTE: 0 K/UL (ref 0–0.2)
BASOPHILS RELATIVE PERCENT: 0.4 %
BILIRUB SERPL-MCNC: <0.2 MG/DL (ref 0.2–0.7)
BILIRUBIN URINE: NEGATIVE
BLOOD, URINE: ABNORMAL
BUN BLDV-MCNC: 7 MG/DL (ref 6–20)
CALCIUM SERPL-MCNC: 9.7 MG/DL (ref 8.5–9.9)
CHLORIDE BLD-SCNC: 101 MEQ/L (ref 95–107)
CLARITY: ABNORMAL
CO2: 23 MEQ/L (ref 20–31)
COLOR: ABNORMAL
CREAT SERPL-MCNC: 0.55 MG/DL (ref 0.5–0.9)
CRYSTALS, UA: ABNORMAL /HPF
EOSINOPHILS ABSOLUTE: 0.1 K/UL (ref 0–0.7)
EOSINOPHILS RELATIVE PERCENT: 0.8 %
EPITHELIAL CELLS, UA: ABNORMAL /HPF (ref 0–5)
GFR AFRICAN AMERICAN: >60
GFR NON-AFRICAN AMERICAN: >60
GLOBULIN: 3.4 G/DL (ref 2.3–3.5)
GLUCOSE BLD-MCNC: 89 MG/DL (ref 70–99)
GLUCOSE URINE: NEGATIVE MG/DL
GONADOTROPIN, CHORIONIC (HCG) QUANT: NORMAL MIU/ML
HCG, URINE, POC: POSITIVE
HCT VFR BLD CALC: 37.4 % (ref 37–47)
HEMOGLOBIN: 12 G/DL (ref 12–16)
KETONES, URINE: 15 MG/DL
LEUKOCYTE ESTERASE, URINE: ABNORMAL
LYMPHOCYTES ABSOLUTE: 2.1 K/UL (ref 1–4.8)
LYMPHOCYTES RELATIVE PERCENT: 21.9 %
Lab: NORMAL
MCH RBC QN AUTO: 22.7 PG (ref 27–31.3)
MCHC RBC AUTO-ENTMCNC: 32.1 % (ref 33–37)
MCV RBC AUTO: 70.7 FL (ref 82–100)
MICROCYTES: ABNORMAL
MONOCYTES ABSOLUTE: 0.7 K/UL (ref 0.2–0.8)
MONOCYTES RELATIVE PERCENT: 7.2 %
NEGATIVE QC PASS/FAIL: NORMAL
NEUTROPHILS ABSOLUTE: 6.7 K/UL (ref 1.4–6.5)
NEUTROPHILS RELATIVE PERCENT: 69.7 %
NITRITE, URINE: NEGATIVE
PDW BLD-RTO: 18.1 % (ref 11.5–14.5)
PH UA: 5.5 (ref 5–9)
PLATELET # BLD: 407 K/UL (ref 130–400)
POIKILOCYTES: ABNORMAL
POSITIVE QC PASS/FAIL: NORMAL
POTASSIUM REFLEX MAGNESIUM: 4.2 MEQ/L (ref 3.4–4.9)
PROTEIN UA: 30 MG/DL
RBC # BLD: 5.29 M/UL (ref 4.2–5.4)
RBC UA: ABNORMAL /HPF (ref 0–5)
SODIUM BLD-SCNC: 135 MEQ/L (ref 135–144)
SPECIFIC GRAVITY UA: 1.03 (ref 1–1.03)
TOTAL PROTEIN: 7.5 G/DL (ref 6.3–8)
URINE REFLEX TO CULTURE: YES
UROBILINOGEN, URINE: 1 E.U./DL
WBC # BLD: 9.6 K/UL (ref 4.8–10.8)
WBC UA: ABNORMAL /HPF (ref 0–5)

## 2021-06-27 PROCEDURE — 85025 COMPLETE CBC W/AUTO DIFF WBC: CPT

## 2021-06-27 PROCEDURE — 36415 COLL VENOUS BLD VENIPUNCTURE: CPT

## 2021-06-27 PROCEDURE — 80053 COMPREHEN METABOLIC PANEL: CPT

## 2021-06-27 PROCEDURE — 76801 OB US < 14 WKS SINGLE FETUS: CPT

## 2021-06-27 PROCEDURE — 87086 URINE CULTURE/COLONY COUNT: CPT

## 2021-06-27 PROCEDURE — 84702 CHORIONIC GONADOTROPIN TEST: CPT

## 2021-06-27 PROCEDURE — 76817 TRANSVAGINAL US OBSTETRIC: CPT

## 2021-06-27 PROCEDURE — 99283 EMERGENCY DEPT VISIT LOW MDM: CPT

## 2021-06-27 PROCEDURE — 81001 URINALYSIS AUTO W/SCOPE: CPT

## 2021-06-27 RX ORDER — SODIUM CHLORIDE 9 MG/ML
INJECTION, SOLUTION INTRAVENOUS
Status: DISCONTINUED
Start: 2021-06-27 | End: 2021-06-28 | Stop reason: HOSPADM

## 2021-06-27 ASSESSMENT — ENCOUNTER SYMPTOMS
RESPIRATORY NEGATIVE: 1
EYES NEGATIVE: 1
ABDOMINAL PAIN: 1
ALLERGIC/IMMUNOLOGIC NEGATIVE: 1

## 2021-06-27 NOTE — ED TRIAGE NOTES
Intermittent abd cramping for the last 1-2 weeks with nausea. Pt is 7 weeks preg. Pt also c/o brown discharge.

## 2021-06-28 NOTE — ED PROVIDER NOTES
[facility] ED  eMERGENCY dEPARTMENT eNCOUnter      Pt Name: Ayanna Arce  MRN: [id number]  [place] 2000  Date of evaluation: 2021  Provider: Hillary Mcguire MD    [address] [place] [address]       Chief Complaint   Patient presents with    Abdominal Pain     abd cramping 1-2weeks         HISTORY OF PRESENT ILLNESS   (Location/Symptom, Timing/Onset,Context/Setting, Quality, Duration, Modifying Factors, Severity)  Note limiting factors. Ayanna Arce is a 19 y.o. female who presents to the emergency department abdominal pain in pregnancy       19years old  presented to the ER with mild abdominal  cramping and some mild spotting. Patient was diagnosed with intrauterine fetal demise on  was supposed to follow-up with Dr. Armond Vera next week presented to the ER today states she was told that she have intrauterine fetal demise and might require a D&C is not scheduled with OB till next Tuesday is here today states she wants he wants to know if her pregnancy number have one down or if she having a miscarriage. State her symptoms are very mild spotting is mild in the abdominal cramping and mild    The history is provided by the patient. NursingNotes were reviewed. REVIEW OF SYSTEMS    (2-9 systems for level 4, 10 or more for level 5)     Review of Systems   Constitutional: Negative. HENT: Negative. Eyes: Negative. Respiratory: Negative. Cardiovascular: Negative. Gastrointestinal: Positive for abdominal pain. Endocrine: Negative. Genitourinary: Negative. Musculoskeletal: Negative. Skin: Negative. Allergic/Immunologic: Negative. Negative for food allergies. Neurological: Negative. Hematological: Negative. Psychiatric/Behavioral: Negative. All other systems reviewed and are negative. Except as noted above the remainder of the review of systems was reviewed and negative.        PAST MEDICAL HISTORY     Past Medical History:   Diagnosis  Lack of Transportation (Non-Medical):    Physical Activity:     Days of Exercise per Week:     Minutes of Exercise per Session:    Stress:     Feeling of Stress :    Social Connections:     Frequency of Communication with Friends and Family:     Frequency of Social Gatherings with Friends and Family:     Attends [employer] Services:     Active Member of Clubs or Organizations:     Attends Club or Organization Meetings:     Marital Status:    Intimate Partner Violence:     Fear of Current or Ex-Partner:     Emotionally Abused:     Physically Abused:     Sexually Abused:        SCREENINGS      @FLOW([id number])@      PHYSICAL EXAM    (up to 7 for level 4, 8 or more for level 5)     ED Triage Vitals [06/27/21 1813]   BP Temp Temp Source Pulse Resp SpO2 Height Weight   (!) 142/89 97.6 °F (36.4 °C) Temporal 93 17 99 % 5' 7\" (1.702 m) 220 lb (99.8 kg)       Physical Exam  Vitals and nursing note reviewed. Constitutional:       Appearance: She is well-developed. HENT:      Head: Normocephalic and atraumatic. Right Ear: External ear normal.      Left Ear: External ear normal.      Nose: Nose normal.   Eyes:      Pupils: Pupils are equal, round, and reactive to light. Cardiovascular:      Rate and Rhythm: Normal rate and regular rhythm. Heart sounds: Normal heart sounds. Pulmonary:      Effort: Pulmonary effort is normal. No respiratory distress. Breath sounds: Normal breath sounds. No wheezing or rales. Chest:      Chest wall: No tenderness. Abdominal:      General: Abdomen is flat. Bowel sounds are normal.      Palpations: Abdomen is soft. Genitourinary:     Vagina: Normal.      Cervix: No cervical bleeding. Musculoskeletal:         General: Normal range of motion. Cervical back: Normal range of motion and neck supple. Skin:     General: Skin is warm and dry. Neurological:      Mental Status: She is alert and oriented to person, place, and time. Cranial Nerves:  No cranial nerve deficit. Sensory: No sensory deficit. Motor: No abnormal muscle tone. Coordination: Coordination normal.      Deep Tendon Reflexes: Reflexes normal.   Psychiatric:         Behavior: Behavior normal.         Thought Content: Thought content normal.         Judgment: Judgment normal.         DIAGNOSTIC RESULTS     EKG: All EKG's are interpreted by the Emergency Department Physician who either signs or Co-signsthis chart in the absence of a cardiologist.        RADIOLOGY:   Pama Ally such as CT, Ultrasound and MRI are read by the radiologist. Plain radiographic images are visualized and preliminarily interpreted by the emergency physician with the below findings:        Interpretation per the Radiologist below, if available at the time ofthis note:    US OB LESS THAN 14 [address]   Final Result      EMBRYONIC DEMISE. IRREGULAR GESTATIONAL SAC WITHIN UTERINE CAVITY WHICH CONTAINS SINGLE FETAL POLE WITHOUT FETAL HEART MOTION. THERE ARE CYSTIC AREAS AROUND THE GESTATIONAL [address]. FAILED FIRST TRIMESTER PREGNANCY. NO FREE FLUID OR ABNORMAL ADNEXAL MASS. US OB TRANSVAGINAL   Final Result      EMBRYONIC DEMISE. IRREGULAR GESTATIONAL SAC WITHIN UTERINE CAVITY WHICH CONTAINS SINGLE FETAL POLE WITHOUT FETAL HEART MOTION. THERE ARE CYSTIC AREAS AROUND THE GESTATIONAL [address]. FAILED FIRST TRIMESTER PREGNANCY. NO FREE FLUID OR ABNORMAL ADNEXAL MASS.             ED BEDSIDE ULTRASOUND:   Performed by ED Physician - none    LABS:  Labs Reviewed   URINE RT REFLEX TO CULTURE - Abnormal; Notable for the following components:       Result Value    Color, UA DARK YELLOW (*)     Clarity, UA CLOUDY (*)     Ketones, Urine 15 (*)     Blood, Urine LARGE (*)     Protein, UA 30 (*)     Leukocyte Esterase, Urine SMALL (*)     All other components within normal limits   CBC WITH AUTO DIFFERENTIAL - Abnormal; Notable for the following components:    MCV 70.7 (*)     MCH 22.7 (*)     MCHC 32.1 (*)     RDW 18.1 (*)     Platelets 102 (*)     Neutrophils Absolute 6.7 (*)     All other components within normal limits   MICROSCOPIC URINALYSIS - Abnormal; Notable for the following components:    Bacteria, UA RARE (*)     Crystals, UA 3+ Ca. Oxalate (*)     WBC, UA 20-50 (*)     All other components within normal limits   CULTURE, URINE    Narrative:     ORDER#: [id number]                          ORDERED BY: DARDIR, Rollen Klinefelter  SOURCE: Urine Clean Catch                  COLLECTED:  06/27/21 18:30  ANTIBIOTICS AT SON.:                      RECEIVED :  06/27/21 20:18   COMPREHENSIVE METABOLIC PANEL W/ REFLEX TO MG FOR LOW K   HCG, QUANTITATIVE, PREGNANCY   POC PREGNANCY UR-QUAL       All other labs were within normal range or not returned as of this dictation. EMERGENCY DEPARTMENT COURSE and DIFFERENTIAL DIAGNOSIS/MDM:   Vitals:    Vitals:    06/27/21 1813 06/27/21 1930 06/27/21 2000   BP: (!) 142/89 120/75 120/81   Pulse: 93     Resp: 17     Temp: 97.6 °F (36.4 °C)     TempSrc: Temporal     SpO2: 99% 99% 99%   Weight: 220 lb (99.8 kg)     Height: 5' 7\" (1.702 m)                  MDM  Number of Diagnoses or Management Options  Fetal demise due to miscarriage  Diagnosis management comments: 19years old who is G4, P2 presented to the ER states she knows she likely is going to have a miscarriage and had a previous ultrasound last week that showed possible intrauterine fetal demise have an appointment already scheduled on Tuesday with her OB/GYN for possible outpatient D&C. Presented to the ER today stating to have some abdominal cramping and some mild spotting so hCG was performed in the ER showed increase in her hCG level so pelvic ultrasound was performed ultrasound today confirmed likely intrauterine fetal demise but patient was hemodynamically stable have stable vital and and no pain during her whole stay in the ER with very minimal amount of bleeding.   Patient is a patient of Dr. Madan Boyd OB on-call tonight with Dr. Dany Dunbar who was notified with the patient agreed since the patient is hemodynamically stable have no active bleeding in the ER and have no significant amount of pain. To be discharged home and to have a very close follow-up with her OB/GYN to contact them tomorrow morning patient stated she already have an appointment scheduled for Tuesday but she will call them in the morning to set up for likely an  outpatient D&C  The result discussed with the patient in details and was advised to return to the ER if any worsening or new symptoms develop patient verbalized understanding and agreed with the plan states will contact her OB/GYN in the. Also Dr. Dany Dunbar will notify her OB/GYN with the plan       Amount and/or Complexity of Data Reviewed  Clinical lab tests: ordered      CONSULTS:  None    PROCEDURES:  Unless otherwise noted below, none     Procedures    FINAL IMPRESSION      1. Fetal demise due to miscarriage          DISPOSITION/PLAN   DISPOSITION Decision To Discharge 06/27/2021 10:28:38 PM      PATIENT REFERRED TO:  Po Schroeder MD  [address]  [phone]            DISCHARGE MEDICATIONS:  There are no discharge medications for this patient.          (Please note thatportions of this note were completed with a voice recognition program.  Efforts were made to edit the dictations but occasionally words are mis-transcribed.)    Rk Segura MD (electronically signed)  Attending Emergency Physician         Wilma Solis MD  07/03/21 [id number]

## 2021-06-28 NOTE — ED NOTES
Pt was given d/c instructions and follow up care instructions. Pt at this time is a+ox4, no sign of distress, and was ambulatory on exit. Pt has no questions and states understanding of information given.       Betsy Anguiano RN  06/28/21 0111

## 2021-06-29 ENCOUNTER — OFFICE VISIT (OUTPATIENT)
Dept: OBGYN CLINIC | Age: 21
End: 2021-06-29
Payer: COMMERCIAL

## 2021-06-29 VITALS — WEIGHT: 251 LBS | SYSTOLIC BLOOD PRESSURE: 118 MMHG | DIASTOLIC BLOOD PRESSURE: 88 MMHG | BODY MASS INDEX: 39.31 KG/M2

## 2021-06-29 DIAGNOSIS — O02.1 MISSED ABORTION: Primary | ICD-10-CM

## 2021-06-29 DIAGNOSIS — O02.1 MISSED ABORTION: ICD-10-CM

## 2021-06-29 LAB
GONADOTROPIN, CHORIONIC (HCG) QUANT: NORMAL MIU/ML
URINE CULTURE, ROUTINE: NORMAL

## 2021-06-29 PROCEDURE — 1036F TOBACCO NON-USER: CPT | Performed by: OBSTETRICS & GYNECOLOGY

## 2021-06-29 PROCEDURE — G8427 DOCREV CUR MEDS BY ELIG CLIN: HCPCS | Performed by: OBSTETRICS & GYNECOLOGY

## 2021-06-29 PROCEDURE — 99213 OFFICE O/P EST LOW 20 MIN: CPT | Performed by: OBSTETRICS & GYNECOLOGY

## 2021-06-29 PROCEDURE — G8417 CALC BMI ABV UP PARAM F/U: HCPCS | Performed by: OBSTETRICS & GYNECOLOGY

## 2021-06-29 ASSESSMENT — ENCOUNTER SYMPTOMS
BLOOD IN STOOL: 0
SHORTNESS OF BREATH: 0
COUGH: 0
TROUBLE SWALLOWING: 0
WHEEZING: 0
COLOR CHANGE: 0
ABDOMINAL PAIN: 0
CHEST TIGHTNESS: 0
BACK PAIN: 0
VOMITING: 0
CONSTIPATION: 0
VOICE CHANGE: 0
NAUSEA: 0
ABDOMINAL DISTENTION: 0
SORE THROAT: 0

## 2021-06-29 NOTE — PROGRESS NOTES
HPI:  Tripp Lafleur (: 2000) is a 19 y.o. female, Established patient, here for evaluation of the following chief complaint(s):  Follow-up (embryonic demise but rising hcg)  Lavon Mckenna is here to have evaluation regarding a missed . She had 2 ultrasounds now that showed fetal demise she had a rising hCG. Discussed that at length. And that the length of time between the 2 draws could suggest that in between the level may have gone up and has not dropped to the point where it is lower than the first value. Having no symptoms no bleeding or cramping      SUBJECTIVE/OBJECTIVE:    Past Surgical History:   Procedure Laterality Date    WISDOM TOOTH EXTRACTION          Review of Systems   Constitutional: Negative for activity change, appetite change, fatigue and unexpected weight change. HENT: Negative for dental problem, ear pain, hearing loss, nosebleeds, sore throat, trouble swallowing and voice change. Eyes: Negative for visual disturbance. Respiratory: Negative for cough, chest tightness, shortness of breath and wheezing. Cardiovascular: Negative for chest pain and palpitations. Gastrointestinal: Negative for abdominal distention, abdominal pain, blood in stool, constipation, nausea and vomiting. Endocrine: Negative for cold intolerance, heat intolerance, polydipsia, polyphagia and polyuria. Genitourinary: Negative for difficulty urinating, dyspareunia, dysuria, flank pain, frequency, genital sores, hematuria, menstrual problem, pelvic pain, urgency, vaginal bleeding, vaginal discharge and vaginal pain. Musculoskeletal: Negative for arthralgias, back pain, joint swelling and myalgias. Skin: Negative for color change and rash. Allergic/Immunologic: Negative for environmental allergies, food allergies and immunocompromised state. Neurological: Negative for dizziness, seizures, syncope, speech difficulty, weakness, numbness and headaches.    Hematological: Negative for adenopathy. Does not bruise/bleed easily. Psychiatric/Behavioral: Negative for agitation, behavioral problems, confusion, decreased concentration, dysphoric mood and suicidal ideas. The patient is not nervous/anxious and is not hyperactive. Physical Exam  Vitals and nursing note reviewed. Constitutional:       Appearance: Normal appearance. She is normal weight. Neurological:      Mental Status: She is alert. Vitals:    21 1034   BP: 118/88   Weight: 251 lb (113.9 kg)         ASSESSMENT/PLAN:     Diagnosis Orders   1. Missed          PLAN: Patient given 3 options observation Cytotec or D&C she wants to wait a few weeks and then we will get her on the schedule for a D&C for  if she has not had a spontaneous loss. She is Rh+      No follow-ups on file. On this date 2021 I have spent 30 minutes reviewing previous notes, test results and face to face with the patient discussing the diagnosis and importance of compliance with the treatment plan as well as documenting on the day of the visit. An electronic signature was used to authenticate this note. Please note this report has been partially produced using speech recognition software and may cause contain errors related to that system including grammar, punctuation and spelling as well as words and phrases that may seem inappropriate. If there are questions or concerns please feel free to contact me to clarify.

## 2021-07-01 ENCOUNTER — TELEPHONE (OUTPATIENT)
Dept: OBGYN CLINIC | Age: 21
End: 2021-07-01

## 2021-07-03 ENCOUNTER — HOSPITAL ENCOUNTER (EMERGENCY)
Age: 21
Discharge: HOME | End: 2021-07-03
Attending: EMERGENCY MEDICINE
Payer: COMMERCIAL

## 2021-07-03 VITALS
BODY MASS INDEX: 33.34 KG/M2 | DIASTOLIC BLOOD PRESSURE: 87 MMHG | HEIGHT: 68 IN | TEMPERATURE: 98.3 F | RESPIRATION RATE: 18 BRPM | HEART RATE: 94 BPM | OXYGEN SATURATION: 98 % | SYSTOLIC BLOOD PRESSURE: 125 MMHG | WEIGHT: 220 LBS

## 2021-07-03 DIAGNOSIS — O03.9 SPONTANEOUS ABORTION: Primary | ICD-10-CM

## 2021-07-03 PROCEDURE — 99283 EMERGENCY DEPT VISIT LOW MDM: CPT

## 2021-07-03 PROCEDURE — 84702 CHORIONIC GONADOTROPIN TEST: CPT

## 2021-07-03 PROCEDURE — 88305 TISSUE EXAM BY PATHOLOGIST: CPT

## 2021-07-03 PROCEDURE — 36415 COLL VENOUS BLD VENIPUNCTURE: CPT

## 2021-07-03 RX ORDER — OXYCODONE HYDROCHLORIDE AND ACETAMINOPHEN 5; 325 MG/1; MG/1
1 TABLET ORAL EVERY 4 HOURS PRN
COMMUNITY
End: 2021-11-08

## 2021-07-03 ASSESSMENT — PAIN SCALES - GENERAL: PAINLEVEL_OUTOF10: 10

## 2021-07-03 ASSESSMENT — ENCOUNTER SYMPTOMS
TROUBLE SWALLOWING: 0
APNEA: 0
COLOR CHANGE: 0
SHORTNESS OF BREATH: 0
EYE PAIN: 0
ABDOMINAL PAIN: 0
ALLERGIC/IMMUNOLOGIC NEGATIVE: 1

## 2021-07-03 ASSESSMENT — PAIN DESCRIPTION - PAIN TYPE: TYPE: ACUTE PAIN

## 2021-07-03 ASSESSMENT — PAIN DESCRIPTION - LOCATION: LOCATION: ABDOMEN

## 2021-07-04 LAB — GONADOTROPIN, CHORIONIC (HCG) QUANT: NORMAL MIU/ML

## 2021-07-04 NOTE — ED TRIAGE NOTES
Arrived to the ER for c/o abdominal pain and vaginal bleeding. Currently approx 9wks pregnant and has been having vaginal bleeding for the last 4 days and then yesterday developed abdominal pain with increase in bleeding today.

## 2021-07-04 NOTE — ED PROVIDER NOTES
[address] [facility] ED  eMERGENCYdEPARTMENT eNCOUnter      Pt Name: Pearson Peabody  MRN: [id number]  [place] 2000  Date of evaluation: 7/3/2021  Provider:Miles Steiner PA-C    CHIEF COMPLAINT       Chief Complaint   Patient presents with    Vaginal Bleeding    Abdominal Pain         HISTORY OF PRESENT ILLNESS  (Location/Symptom, Timing/Onset, Context/Setting, Quality, Duration, Modifying Factors, Severity.)   Pearson Peabody is a 19 y.o. female who presents to the emergency department with complaints of crampy abdominal pain and vaginal bleeding. Patient was seen in the emergency department 1 week ago and diagnosed with a fetal demise. Patient is scheduled for a D&C with her obstetrician on July 12 however after providing a urinalysis the patient states that she passed a very large amount of tissue into the toilet and her bleeding and cramping resolved. Patient states she feels well now and denies any hemorrhaging or further pain    HPI    Nursing Notes were reviewed and I agree. REVIEW OF SYSTEMS    (2-9 systems for level 4, 10 or more for level 5)     Review of Systems   Constitutional: Negative for diaphoresis and fever. HENT: Negative for hearing loss and trouble swallowing. Eyes: Negative for pain. Respiratory: Negative for apnea and shortness of breath. Cardiovascular: Negative for chest pain. Gastrointestinal: Negative for abdominal pain. Endocrine: Negative. Genitourinary: Positive for pelvic pain and vaginal bleeding. Negative for hematuria. Musculoskeletal: Negative for neck pain and neck stiffness. Skin: Negative for color change. Allergic/Immunologic: Negative. Neurological: Negative for dizziness and numbness. Hematological: Negative. Psychiatric/Behavioral: Negative. All other systems reviewed and are negative. Except as noted above the remainder of the review of systems was reviewed and negative.        PAST MEDICAL HISTORY     Past Medical History:   Diagnosis Date    Anxiety     BMI (body mass index), pediatric, 85% to less than 95% for age 2014    Chronic hypertension affecting pregnancy     Class 1 obesity due to excess calories without serious comorbidity with body mass index (BMI) of 33.0 to 33.9 in adult 2019    Depression     hx of depression    Generalized abdominal pain 2017    Headache(784.0)     History of depression 2013    Hypertension      labor third trimester with  delivery third trimester 2019         SURGICAL HISTORY       Past Surgical History:   Procedure Laterality Date    WISDOM TOOTH EXTRACTION           CURRENT MEDICATIONS       Discharge Medication List as of 7/3/2021 11:29 PM      CONTINUE these medications which have NOT CHANGED    Details   oxyCODONE-acetaminophen (PERCOCET) 5-325 MG per tablet Take 1 tablet by mouth every 4 hours as needed for Pain. Historical Med             ALLERGIES     Patient has no known allergies.     FAMILY HISTORY       Family History   Problem Relation Age of Onset    Cancer Mother     Arthritis Father     High Blood Pressure Father     Heart Disease Father     Heart Disease Maternal Grandmother     Diabetes Paternal Grandfather           SOCIAL HISTORY       Social History     Socioeconomic History    Marital status:      Spouse name: None    Number of children: None    Years of education: None    Highest education level: None   Occupational History    None   Tobacco Use    Smoking status: Never Smoker    Smokeless tobacco: Former User   Vaping Use    Vaping Use: Never used   Substance and Sexual Activity    Alcohol use: No     Alcohol/week: 0.0 standard drinks    Drug use: No    Sexual activity: Yes     Partners: Male   Other Topics Concern    None   Social History Narrative    ** Merged History Encounter **          Social Determinants of Health     Financial Resource Strain:     Difficulty of Paying Living Expenses:    Food Insecurity:     Worried About Running Out of Food in the Last Year:     [address] in the Last Year:    Transportation Needs:     Lack of Transportation (Medical):  Lack of Transportation (Non-Medical):    Physical Activity:     Days of Exercise per Week:     Minutes of Exercise per Session:    Stress:     Feeling of Stress :    Social Connections:     Frequency of Communication with Friends and Family:     Frequency of Social Gatherings with Friends and Family:     Attends Denominational Services:     Active Member of Clubs or Organizations:     Attends Club or Organization Meetings:     Marital Status:    Intimate Partner Violence:     Fear of Current or Ex-Partner:     Emotionally Abused:     Physically Abused:     Sexually Abused:        SCREENINGS           PHYSICAL EXAM    (up to 7 forlevel 4, 8 or more for level 5)     ED Triage Vitals [07/03/21 2211]   BP Temp Temp Source Pulse Resp SpO2 Height Weight   132/74 98.3 °F (36.8 °C) Oral 94 16 98 % 5' 8\" (1.727 m) 220 lb (99.8 kg)       Physical Exam  Vitals and nursing note reviewed. Constitutional:       General: She is not in acute distress. Appearance: She is well-developed. She is not diaphoretic. HENT:      Head: Normocephalic and atraumatic. Mouth/Throat:      Pharynx: No oropharyngeal exudate. Eyes:      General: No scleral icterus. Conjunctiva/sclera: Conjunctivae normal.      Pupils: Pupils are equal, round, and reactive to light. Neck:      Trachea: No tracheal deviation. Cardiovascular:      Rate and Rhythm: Normal rate. Heart sounds: Normal heart sounds. Pulmonary:      Effort: Pulmonary effort is normal. No respiratory distress. Breath sounds: Normal breath sounds. Abdominal:      General: Bowel sounds are normal. There is no distension. Palpations: Abdomen is soft. Tenderness: There is no abdominal tenderness. There is no guarding or rebound.    Musculoskeletal: General: Normal range of motion. Cervical back: Normal range of motion and neck supple. Skin:     General: Skin is warm and dry. Findings: No erythema or rash. Neurological:      Mental Status: She is alert and oriented to person, place, and time. Cranial Nerves: No cranial nerve deficit. Motor: No abnormal muscle tone. Psychiatric:         Behavior: Behavior normal.         Thought Content: Thought content normal.         Judgment: Judgment normal.           DIAGNOSTIC RESULTS     RADIOLOGY:   Non-plain film images such as CT, Ultrasound and MRI are read by the radiologist. Plain radiographic images are visualized and preliminarilyinterpreted by Kavya Sanon PA-C with the below findings:      Interpretation per the Radiologist below, if available at the time of this note:    No orders to display       LABS:  Labs Reviewed   HCG, QUANTITATIVE, PREGNANCY   URINE RT REFLEX TO CULTURE   POCT URINE PREGNANCY       All other labs were within normal range or not returnedas of this dictation. EMERGENCYDEPARTMENT COURSE and DIFFERENTIAL DIAGNOSIS/MDM:   Vitals:    Vitals:    07/03/21 2211 07/03/21 2330   BP: 132/74 125/87   Pulse: 94 94   Resp: 16 18   Temp: 98.3 °F (36.8 °C)    TempSrc: Oral    SpO2: 98% 98%   Weight: 220 lb (99.8 kg)    Height: 5' 8\" (1.727 m)        REASSESSMENT      Patient presented to the emergency department with complaints of abdominal pain and vaginal bleeding. While providing a urine sample, the patient passed a large amount of tissue vaginally. Patient states that after passing this tissue her bleeding stopped and pain resolved. Passed tissue does appear to be products of conception. Discussed with OB in-house who states that if bleeding has resolved, the patient can follow-up with her OB doctor.   Patient was offered an ultrasound in the emergency department but declined, stating that she will return for any worsening bleeding, recurrence of pain but otherwise follow-up with her OB. Tissue was sent to pathology for evaluation. MDM    PROCEDURES:    Procedures      FINAL IMPRESSION      1.  Spontaneous           DISPOSITION/PLAN   DISPOSITION Decision To Discharge 2021 11:33:44 PM      PATIENT REFERRED TO:  [facility] [place] [phone]    Call in 2 days        DISCHARGE MEDICATIONS:  Discharge Medication List as of 7/3/2021 11:29 PM          (Please note that portions of this note were completed with a voice recognition program.  Efforts were made to edit the dictations but occasionally words are mis-transcribed.)    CLAUDIO Flannery PA-C  [phone]

## 2021-07-06 ENCOUNTER — HOSPITAL ENCOUNTER (EMERGENCY)
Age: 21
Discharge: HOME | End: 2021-07-06
Attending: STUDENT IN AN ORGANIZED HEALTH CARE EDUCATION/TRAINING PROGRAM
Payer: COMMERCIAL

## 2021-07-06 VITALS
BODY MASS INDEX: 33.34 KG/M2 | SYSTOLIC BLOOD PRESSURE: 129 MMHG | TEMPERATURE: 98.2 F | DIASTOLIC BLOOD PRESSURE: 79 MMHG | RESPIRATION RATE: 17 BRPM | HEIGHT: 68 IN | HEART RATE: 91 BPM | WEIGHT: 220 LBS | OXYGEN SATURATION: 98 %

## 2021-07-06 DIAGNOSIS — O03.9 SPONTANEOUS ABORTION: Primary | ICD-10-CM

## 2021-07-06 LAB
ALBUMIN SERPL-MCNC: 4.2 G/DL (ref 3.5–4.6)
ALP BLD-CCNC: 61 U/L (ref 40–130)
ALT SERPL-CCNC: 9 U/L (ref 0–33)
ANION GAP SERPL CALCULATED.3IONS-SCNC: 10 MEQ/L (ref 9–15)
AST SERPL-CCNC: 10 U/L (ref 0–35)
BASOPHILS ABSOLUTE: 0 K/UL (ref 0–0.2)
BASOPHILS RELATIVE PERCENT: 0.3 %
BILIRUB SERPL-MCNC: <0.2 MG/DL (ref 0.2–0.7)
BUN BLDV-MCNC: 10 MG/DL (ref 6–20)
CALCIUM SERPL-MCNC: 9.4 MG/DL (ref 8.5–9.9)
CHLORIDE BLD-SCNC: 104 MEQ/L (ref 95–107)
CO2: 25 MEQ/L (ref 20–31)
CREAT SERPL-MCNC: 0.57 MG/DL (ref 0.5–0.9)
EOSINOPHILS ABSOLUTE: 0.1 K/UL (ref 0–0.7)
EOSINOPHILS RELATIVE PERCENT: 1.4 %
GFR AFRICAN AMERICAN: >60
GFR NON-AFRICAN AMERICAN: >60
GLOBULIN: 3.6 G/DL (ref 2.3–3.5)
GLUCOSE BLD-MCNC: 104 MG/DL (ref 70–99)
GONADOTROPIN, CHORIONIC (HCG) QUANT: 3108 MIU/ML
HCT VFR BLD CALC: 33.2 % (ref 37–47)
HEMOGLOBIN: 10.7 G/DL (ref 12–16)
LYMPHOCYTES ABSOLUTE: 1.9 K/UL (ref 1–4.8)
LYMPHOCYTES RELATIVE PERCENT: 25 %
MCH RBC QN AUTO: 22.7 PG (ref 27–31.3)
MCHC RBC AUTO-ENTMCNC: 32.2 % (ref 33–37)
MCV RBC AUTO: 70.5 FL (ref 82–100)
MICROCYTES: ABNORMAL
MONOCYTES ABSOLUTE: 0.6 K/UL (ref 0.2–0.8)
MONOCYTES RELATIVE PERCENT: 7.4 %
NEUTROPHILS ABSOLUTE: 4.9 K/UL (ref 1.4–6.5)
NEUTROPHILS RELATIVE PERCENT: 65.9 %
OVALOCYTES: ABNORMAL
PDW BLD-RTO: 17.7 % (ref 11.5–14.5)
PLATELET # BLD: 367 K/UL (ref 130–400)
POTASSIUM SERPL-SCNC: 3.9 MEQ/L (ref 3.4–4.9)
RBC # BLD: 4.71 M/UL (ref 4.2–5.4)
SARS-COV-2, NAAT: NOT DETECTED
SODIUM BLD-SCNC: 139 MEQ/L (ref 135–144)
TOTAL PROTEIN: 7.8 G/DL (ref 6.3–8)
WBC # BLD: 7.5 K/UL (ref 4.8–10.8)

## 2021-07-06 PROCEDURE — 84702 CHORIONIC GONADOTROPIN TEST: CPT

## 2021-07-06 PROCEDURE — 99282 EMERGENCY DEPT VISIT SF MDM: CPT

## 2021-07-06 PROCEDURE — 85025 COMPLETE CBC W/AUTO DIFF WBC: CPT

## 2021-07-06 PROCEDURE — 87635 SARS-COV-2 COVID-19 AMP PRB: CPT

## 2021-07-06 PROCEDURE — 80053 COMPREHEN METABOLIC PANEL: CPT

## 2021-07-06 PROCEDURE — 36415 COLL VENOUS BLD VENIPUNCTURE: CPT

## 2021-07-06 RX ORDER — TRAMADOL HYDROCHLORIDE 50 MG/1
50 TABLET ORAL EVERY 6 HOURS PRN
Qty: 8 TABLET | Refills: 0 | Status: SHIPPED | OUTPATIENT
Start: 2021-07-06 | End: 2021-07-08

## 2021-07-06 ASSESSMENT — ENCOUNTER SYMPTOMS
CONSTIPATION: 0
ABDOMINAL DISTENTION: 0
EYE DISCHARGE: 0
COLOR CHANGE: 0
SHORTNESS OF BREATH: 0
ABDOMINAL PAIN: 1
SORE THROAT: 0
RHINORRHEA: 0

## 2021-07-06 NOTE — ED NOTES
Pt states she feels bloated but it's not painful. Pt states that it feels like something is coming out of her vagina.    Pt scheduled for a d&c on 7/12       Vonnie Cody RN  07/06/21 5582

## 2021-07-06 NOTE — ED NOTES
[id number] education discussed with pt. Pt verbalized understanding. Pt ambulatory upon discharge.      Omar Ramos RN  07/06/21 3255

## 2021-07-06 NOTE — ED PROVIDER NOTES
[facility] ED  eMERGENCY dEPARTMENT eNCOUnter      Pt Name: Lynn Crow  MRN: [id number]  [place] 2000  Date of evaluation: 7/6/2021  Provider: Terry Mazariegos PA-C    CHIEF COMPLAINT       Chief Complaint   Patient presents with    Vaginal Bleeding     was here sat for same, miscarriage, d&c scheduled 7/12         HISTORY OF PRESENT ILLNESS   (Location/Symptom, Timing/Onset,Context/Setting, Quality, Duration, Modifying Factors, Severity)  Note limiting factors. Lynn Crow is a 19 y.o. female who presents to the emergency department with a complaint of crampy abdominal pain, and small amount of bleeding. Patient states that she was seen in the emergency department 7/3/2021 diagnosed with a miscarriage at that time. She states she went into the bathroom, passed a large amount of tissue which was sent off to the lab for analysis. She states that after she passes tissue bleeding seemed better controlled, and her pain had subsided. She states today she was still passing a small amount of blood, she was still having some crampy abdominal pain. So she decided come back to the emergency department. She states she does have a scheduled appointment with Dr. Mis Mcdonald for 7/12/2021 for a D&C. Patient denies any fevers, no nausea vomiting, no dizziness, no chest pain or shortness of breath. HPI    NursingNotes were reviewed. REVIEW OF SYSTEMS    (2-9 systems for level 4, 10 or more for level 5)     Review of Systems   Constitutional: Negative for activity change and appetite change. HENT: Negative for congestion, ear discharge, ear pain, nosebleeds, rhinorrhea and sore throat. Eyes: Negative for discharge. Respiratory: Negative for shortness of breath. Cardiovascular: Negative for chest pain, palpitations and leg swelling. Gastrointestinal: Positive for abdominal pain. Negative for abdominal distention and constipation.    Genitourinary: Positive for vaginal bleeding. Negative for difficulty urinating, dysuria, vaginal discharge and vaginal pain. Musculoskeletal: Negative for arthralgias. Skin: Negative for color change. Neurological: Negative for dizziness, syncope, numbness and headaches. Psychiatric/Behavioral: Negative for agitation and confusion. Except as noted above the remainder of the review of systems was reviewed and negative. PAST MEDICAL HISTORY     Past Medical History:   Diagnosis Date    Anxiety     BMI (body mass index), pediatric, 85% to less than 95% for age 2014    Chronic hypertension affecting pregnancy     Class 1 obesity due to excess calories without serious comorbidity with body mass index (BMI) of 33.0 to 33.9 in adult 2019    Depression     hx of depression    Generalized abdominal pain 2017    Headache(784.0)     History of depression 2013    Hypertension      labor third trimester with  delivery third trimester 2019         SURGICALHISTORY       Past Surgical History:   Procedure Laterality Date    WISDOM TOOTH EXTRACTION           CURRENT MEDICATIONS       Previous Medications    OXYCODONE-ACETAMINOPHEN (PERCOCET) 5-325 MG PER TABLET    Take 1 tablet by mouth every 4 hours as needed for Pain. ALLERGIES     Patient has no known allergies.     FAMILY HISTORY       Family History   Problem Relation Age of Onset    Cancer Mother     Arthritis Father     High Blood Pressure Father     Heart Disease Father     Heart Disease Maternal Grandmother     Diabetes Paternal Grandfather           SOCIAL HISTORY       Social History     Socioeconomic History    Marital status:      Spouse name: None    Number of children: None    Years of education: None    Highest education level: None   Occupational History    None   Tobacco Use    Smoking status: Never Smoker    Smokeless tobacco: Former User   Vaping Use    Vaping Use: Never used   Substance and Sexual Activity    Alcohol use: No     Alcohol/week: 0.0 standard drinks    Drug use: No    Sexual activity: Yes     Partners: Male   Other Topics Concern    None   Social History Narrative    ** Merged History Encounter **          Social Determinants of Health     Financial Resource Strain:     Difficulty of Paying Living Expenses:    Food Insecurity:     Worried About Running Out of Food in the Last Year:     Ran Out of Food in the Last Year:    Transportation Needs:     Lack of Transportation (Medical):  Lack of Transportation (Non-Medical):    Physical Activity:     Days of Exercise per Week:     Minutes of Exercise per Session:    Stress:     Feeling of Stress :    Social Connections:     Frequency of Communication with Friends and Family:     Frequency of Social Gatherings with Friends and Family:     Attends L[employer] Services:     Active Member of Clubs or Organizations:     Attends Club or Organization Meetings:     Marital Status:    Intimate Partner Violence:     Fear of Current or Ex-Partner:     Emotionally Abused:     Physically Abused:     Sexually Abused:        SCREENINGS      @FLOW([id number])@      PHYSICAL EXAM    (up to 7 for level 4, 8 or more for level 5)     ED Triage Vitals [07/06/21 1604]   BP Temp Temp Source Pulse Resp SpO2 Height Weight   138/80 98.2 °F (36.8 °C) Temporal 80 17 98 % 5' 8\" (1.727 m) 220 lb (99.8 kg)       Physical Exam  Vitals and nursing note reviewed. Constitutional:       General: She is not in acute distress. Appearance: She is well-developed. She is not ill-appearing, toxic-appearing or diaphoretic. HENT:      Head: Normocephalic. Nose: No congestion. Mouth/Throat:      Mouth: Mucous membranes are moist.      Pharynx: No oropharyngeal exudate or posterior oropharyngeal erythema. Eyes:      Extraocular Movements: Extraocular movements intact.       Conjunctiva/sclera: Conjunctivae normal.      Pupils: Pupils are equal, round, and reactive to light. Neck:      Vascular: No JVD. Trachea: No tracheal deviation. Cardiovascular:      Rate and Rhythm: Normal rate. Pulses: Normal pulses. Heart sounds: Normal heart sounds. No murmur heard. No friction rub. No gallop. Pulmonary:      Effort: Pulmonary effort is normal. No tachypnea, accessory muscle usage, respiratory distress or retractions. Breath sounds: No stridor. No wheezing, rhonchi or rales. Chest:      Chest wall: No tenderness. Abdominal:      General: Abdomen is flat. Bowel sounds are normal. There is no distension or abdominal bruit. Palpations: There is no shifting dullness, fluid wave, hepatomegaly, splenomegaly, mass or pulsatile mass. Tenderness: There is no abdominal tenderness. There is no right CVA tenderness, left CVA tenderness, guarding or rebound. Negative signs include Wiggins's sign, Rovsing's sign and McBurney's sign. Comments: Abdomen soft nondistended nontender no guarding mass rebound, no facial grimace on examination. Genitourinary:     Comments: Vaginal exam was completed with nurse Josue Skinner present, patient has moderate amount of dark blood within the vaginal canal, there appears to be no acute or active flow at this time, there is no tissue seen within the vaginal canal.  There is no cervical motion tenderness or adnexal masses. Musculoskeletal:         General: No deformity. Cervical back: Normal range of motion and neck supple. No rigidity. Skin:     General: Skin is warm and dry. Capillary Refill: Capillary refill takes less than 2 seconds. Coloration: Skin is not jaundiced. Neurological:      General: No focal deficit present. Mental Status: She is alert and oriented to person, place, and time. Mental status is at baseline. Cranial Nerves: No cranial nerve deficit. Sensory: No sensory deficit. Motor: No weakness.       Coordination: Coordination normal.   Psychiatric:         Mood and Affect: Mood normal.         DIAGNOSTIC RESULTS     EKG: All EKG's are interpreted by the Emergency Department Physician who either signs or Co-signsthis chart in the absence of a cardiologist.        RADIOLOGY:   Johnella Belling such as CT, Ultrasound and MRI are read by the radiologist. Cristina Garnica radiographic images are visualized and preliminarily interpreted by the emergency physician with the below findings:        Interpretation per the Radiologist below, if available at the time ofthis note:    No orders to display         ED BEDSIDE ULTRASOUND:   Performed by ED Physician - none    LABS:  Labs Reviewed   COMPREHENSIVE METABOLIC PANEL - Abnormal; Notable for the following components:       Result Value    Glucose 104 (*)     Globulin 3.6 (*)     All other components within normal limits   CBC WITH AUTO DIFFERENTIAL - Abnormal; Notable for the following components:    Hemoglobin 10.7 (*)     Hematocrit 33.2 (*)     MCV 70.5 (*)     MCH 22.7 (*)     MCHC 32.2 (*)     RDW 17.7 (*)     All other components within normal limits   COVID-19, RAPID   HCG, QUANTITATIVE, PREGNANCY       All other labs were within normal range or not returned as of this dictation. EMERGENCY DEPARTMENT COURSE and DIFFERENTIAL DIAGNOSIS/MDM:   Vitals:    Vitals:    07/06/21 1604 07/06/21 1730   BP: 138/80 129/79   Pulse: 80 91   Resp: 17    Temp: 98.2 °F (36.8 °C)    TempSrc: Temporal    SpO2: 98% 98%   Weight: 220 lb (99.8 kg)    Height: 5' 8\" (1.727 m)         MDM  Number of Diagnoses or Management Options  Diagnosis management comments: Patient's blood type is A positive    Patient presents emerged from complaint of crampy abdominal pain, and vaginal bleeding, she states this is similar to her presentation in the ER the other day. She was concerned, that there may be some internal issues, she states she had looked at it, she felt there as if there was something in her vaginal area but was unable to retrieve anything.   She does have a scheduled point with Dr. Barney Rod for 2021 for D&C.  hCG quant was repeated today patient's level is dropped from approximately 31,000 down to 3108 today, within the last 24 hours. Vaginal exam was completed which does show some blood within the vaginal canal, there is no tissue in the opening of the's. There is no cervical motion tenderness external masses. Patient be discharged with a diagnosis of miscarriage, and advised to keep scheduled point with Dr. Barney Rod for the . She was also advised she has worsening or change symptoms return to the ER. CRITICAL CARE TIME   Total Critical Care time was  minutes, excluding separately reportableprocedures. There was a high probability of clinicallysignificant/life threatening deterioration in the patient's condition which required my urgent intervention. CONSULTS:  None    PROCEDURES:  Unless otherwise noted below, none     Procedures    FINAL IMPRESSION      1. Spontaneous           DISPOSITION/PLAN   DISPOSITION        PATIENT REFERRED TO:  Janet Barr DO  [facility] [phone]    In 3 days      Aleksandar Rhodes DO  [address] [place] [address]  [phone]    On 2021  Keep scheduled appointment for your D&C      DISCHARGE MEDICATIONS:  New Prescriptions    TRAMADOL (ULTRAM) 50 MG TABLET    Take 1 tablet by mouth every 6 hours as needed for Pain for up to 2 days.           (Please note that portions of this note were completed with a voice recognition program.  Efforts were made to edit the dictations but occasionally words are mis-transcribed.)    David Mishra PA-C (electronically signed)  Attending Emergency Physician         David Mishra PA-C  [phone]

## 2021-07-06 NOTE — ED TRIAGE NOTES
Pt was here Saturday for vaginal bleeding as well. Was told she miscarried and that if she still experienced discomfort and bleeding to return. Pt does have d&c scheduled for 7/12.  Pt denies pain and states that she just has some discomfort and is still bleeding

## 2021-07-12 ENCOUNTER — TELEPHONE (OUTPATIENT)
Dept: OBGYN CLINIC | Age: 21
End: 2021-07-12

## 2021-09-05 ENCOUNTER — HOSPITAL ENCOUNTER (EMERGENCY)
Age: 21
Discharge: HOME | End: 2021-09-05
Payer: COMMERCIAL

## 2021-09-05 VITALS
WEIGHT: 230 LBS | BODY MASS INDEX: 34.86 KG/M2 | TEMPERATURE: 98.1 F | OXYGEN SATURATION: 99 % | DIASTOLIC BLOOD PRESSURE: 95 MMHG | HEIGHT: 68 IN | RESPIRATION RATE: 16 BRPM | SYSTOLIC BLOOD PRESSURE: 150 MMHG | HEART RATE: 77 BPM

## 2021-09-05 DIAGNOSIS — K08.89 DENTALGIA: Primary | ICD-10-CM

## 2021-09-05 LAB
HCG, URINE, POC: NEGATIVE
Lab: NORMAL
NEGATIVE QC PASS/FAIL: NORMAL
POSITIVE QC PASS/FAIL: NORMAL

## 2021-09-05 PROCEDURE — 99284 EMERGENCY DEPT VISIT MOD MDM: CPT

## 2021-09-05 PROCEDURE — 6370000000 HC RX 637 (ALT 250 FOR IP): Performed by: PHYSICIAN ASSISTANT

## 2021-09-05 RX ORDER — ACETAMINOPHEN 500 MG
1000 TABLET ORAL ONCE
Status: COMPLETED | OUTPATIENT
Start: 2021-09-05 | End: 2021-09-05

## 2021-09-05 RX ORDER — ACETAMINOPHEN 500 MG
500 TABLET ORAL 4 TIMES DAILY PRN
Qty: 12 TABLET | Refills: 0 | Status: SHIPPED | OUTPATIENT
Start: 2021-09-05 | End: 2021-11-08

## 2021-09-05 RX ORDER — CHLORHEXIDINE GLUCONATE 0.12 MG/ML
15 RINSE ORAL 2 TIMES DAILY
Qty: 420 ML | Refills: 0 | Status: SHIPPED | OUTPATIENT
Start: 2021-09-05 | End: 2021-09-19

## 2021-09-05 RX ORDER — PENICILLIN V POTASSIUM 500 MG/1
500 TABLET ORAL 4 TIMES DAILY
Qty: 28 TABLET | Refills: 0 | Status: SHIPPED | OUTPATIENT
Start: 2021-09-05 | End: 2021-09-12

## 2021-09-05 RX ADMIN — ACETAMINOPHEN 1000 MG: 500 TABLET ORAL at 13:39

## 2021-09-05 ASSESSMENT — ENCOUNTER SYMPTOMS
TROUBLE SWALLOWING: 0
VOMITING: 0
NAUSEA: 0
SHORTNESS OF BREATH: 0

## 2021-09-05 ASSESSMENT — PAIN SCALES - GENERAL
PAINLEVEL_OUTOF10: 8
PAINLEVEL_OUTOF10: 8

## 2021-09-05 ASSESSMENT — PAIN DESCRIPTION - PAIN TYPE: TYPE: ACUTE PAIN

## 2021-09-05 ASSESSMENT — PAIN DESCRIPTION - ORIENTATION: ORIENTATION: RIGHT

## 2021-09-05 ASSESSMENT — PAIN DESCRIPTION - LOCATION: LOCATION: MOUTH

## 2021-09-05 NOTE — ED NOTES
Pt gums red and inflammed at this time. Pt states that she tried Sensodyne tooth paste, salt water and cold compress. Pt states she took Ibuprofen, tylenol, aleve, and amoxicillin. Pt states only small relief from these things. Pt states that the pain is a 10/10.       Palma Douglas, MINDY  09/05/21 Cesar Wyatt RN  09/05/21 5580

## 2021-09-05 NOTE — ED NOTES
Discharge education reviewed verbally and in writing. Instructed to follow up with PCP and come back to the ED with any new or worsening symptoms. No questions or concerns at this time. No s/s of distress noted at this time. Pt states that she has a dental appt already made for Tuesday morning.         Marian Fraga RN  09/05/21 5912

## 2021-09-05 NOTE — ED TRIAGE NOTES
A & Ox4. Skin pink warm and dry. Pt points to right side of mouth, upper and lower teeth for pain since last night. No obvious facial edema noted. No other complaints at this time.

## 2021-09-05 NOTE — ED PROVIDER NOTES
[facility] ED  eMERGENCY dEPARTMENT eNCOUnter      Pt Name: Valente Frank  MRN: [id number]  [place] 2000  Date of evaluation: 2021  Provider: Ministerio Ken PA-C        HISTORY OF PRESENT ILLNESS    Valente Frank is a 19 y.o. female per chart review has ah/o GERD and depression; presenting to the ED for acute dental pain that began last night. Right upper and lower gum line pain. No relief with tylenol and motrin. Has dental appt Tuesday. Denies HA, fever, neck pain, trismus, n/v.         REVIEW OF SYSTEMS       Review of Systems   Constitutional: Negative for fever. HENT: Positive for dental problem. Negative for ear pain and trouble swallowing. Respiratory: Negative for shortness of breath. Cardiovascular: Negative for chest pain. Gastrointestinal: Negative for nausea and vomiting. All other systems reviewed and are negative. Except as noted above the remainder of the review of systems was reviewed and negative. PAST MEDICAL HISTORY     Past Medical History:   Diagnosis Date    Anxiety     BMI (body mass index), pediatric, 85% to less than 95% for age 2014    Chronic hypertension affecting pregnancy     Class 1 obesity due to excess calories without serious comorbidity with body mass index (BMI) of 33.0 to 33.9 in adult 2019    Depression     hx of depression    Generalized abdominal pain 2017    Headache(784.0)     History of depression 2013    Hypertension      labor third trimester with  delivery third trimester 2019         SURGICAL HISTORY       Past Surgical History:   Procedure Laterality Date    WISDOM TOOTH EXTRACTION           CURRENT MEDICATIONS       Previous Medications    OXYCODONE-ACETAMINOPHEN (PERCOCET) 5-325 MG PER TABLET    Take 1 tablet by mouth every 4 hours as needed for Pain. ALLERGIES     Patient has no known allergies.     FAMILY HISTORY       Family History   Problem Relation Age of Onset    Cancer Mother     Arthritis Father     High Blood Pressure Father     Heart Disease Father     Heart Disease Maternal Grandmother     Diabetes Paternal Grandfather           SOCIAL HISTORY       Social History     Socioeconomic History    Marital status:      Spouse name: None    Number of children: None    Years of education: None    Highest education level: None   Occupational History    None   Tobacco Use    Smoking status: Former Smoker    Smokeless tobacco: Never Used   Vaping Use    Vaping Use: Never used   Substance and Sexual Activity    Alcohol use: Yes     Alcohol/week: 0.0 standard drinks     Comment: socially    Drug use: No    Sexual activity: Yes     Partners: Male   Other Topics Concern    None   Social History Narrative    ** Merged History Encounter **          Social Determinants of Health     Financial Resource Strain:     Difficulty of Paying Living Expenses:    Food Insecurity:     Worried About Running Out of Food in the Last Year:     Ran Out of Food in the Last Year:    Transportation Needs:     Lack of Transportation (Medical):      Lack of Transportation (Non-Medical):    Physical Activity:     Days of Exercise per Week:     Minutes of Exercise per Session:    Stress:     Feeling of Stress :    Social Connections:     Frequency of Communication with Friends and Family:     Frequency of Social Gatherings with Friends and Family:     Attends Catholic Services:     Active Member of Clubs or Organizations:     Attends Club or Organization Meetings:     Marital Status:    Intimate Partner Violence:     Fear of Current or Ex-Partner:     Emotionally Abused:     Physically Abused:     Sexually Abused:          PHYSICAL EXAM        ED Triage Vitals [09/05/21 1249]   BP Temp Temp Source Pulse Resp SpO2 Height Weight   (!) 150/95 98.1 °F (36.7 °C) Oral 77 16 99 % 5' 8\" (1.727 m) 230 lb (104.3 kg)       Physical Exam  Vitals and nursing note MDM:   Vitals:    Vitals:    09/05/21 1249   BP: (!) 150/95   Pulse: 77   Resp: 16   Temp: 98.1 °F (36.7 °C)   TempSrc: Oral   SpO2: 99%   Weight: 230 lb (104.3 kg)   Height: 5' 8\" (1.727 m)           PROCEDURES:  Unlessotherwise noted below, none      Procedures      FINAL IMPRESSION      1. Dentalgia          DISPOSITION/PLAN   DISPOSITION  09/05/2021 01:04:24 PM  Nursing notes, medical records and triage notes reviewed. Vital signs reviewed. This is a 19year old female per chart review has ah/o GERD and depression; presenting to the ED for acute dental pain that began last night.          The patient and/or family  -had the results of all tests and diagnosis explained to them  -Given both verbal and written discharge instructions  -Were instructed of the importance of close follow-up  -Were told that close follow-up is essential for good health and good outcomes    Hal Jimenez PA-C (electronically signed)  Emergency Physician Assistant             Hal Jimenez PA-C  09/11/21 2012

## 2021-09-27 ENCOUNTER — OFFICE VISIT (OUTPATIENT)
Dept: OBGYN CLINIC | Age: 21
End: 2021-09-27
Payer: COMMERCIAL

## 2021-09-27 VITALS
HEIGHT: 68 IN | WEIGHT: 242 LBS | SYSTOLIC BLOOD PRESSURE: 122 MMHG | BODY MASS INDEX: 36.68 KG/M2 | DIASTOLIC BLOOD PRESSURE: 72 MMHG

## 2021-09-27 DIAGNOSIS — N92.6 IRREGULAR MENSES: Primary | ICD-10-CM

## 2021-09-27 PROCEDURE — G8427 DOCREV CUR MEDS BY ELIG CLIN: HCPCS | Performed by: OBSTETRICS & GYNECOLOGY

## 2021-09-27 PROCEDURE — 99213 OFFICE O/P EST LOW 20 MIN: CPT | Performed by: OBSTETRICS & GYNECOLOGY

## 2021-09-27 PROCEDURE — G8417 CALC BMI ABV UP PARAM F/U: HCPCS | Performed by: OBSTETRICS & GYNECOLOGY

## 2021-09-27 PROCEDURE — 1036F TOBACCO NON-USER: CPT | Performed by: OBSTETRICS & GYNECOLOGY

## 2021-09-27 NOTE — PROGRESS NOTES
SUBJECTIVE:   19 y.o. [id number]  female here to discuss irregular menses. Pt with SAB in 7/21 and since that time pt states her menses has been off with irregular spotting and no regular menses. Pt interested in pregnancy at this time. Review of Systems:  General ROS: negative  Respiratory ROS: no cough, shortness of breath, or wheezing  Cardiovascular ROS: no chest pain or dyspnea on exertion  Gastrointestinal ROS: no abdominal pain, change in bowel habits, or black or bloody stools  Genito-Urinary ROS: no dysuria, trouble voiding, or hematuria    OBJECTIVE:   /72   Ht 5' 8\" (1.727 m)   Wt 242 lb (109.8 kg)   LMP  (LMP Unknown)   BMI 36.80 kg/m²     Physical Exam:  GEN: She appears well, afebrile. HEENT: normal cephalic, atraumatic  CVS: regular rate and rhythm  ABDOMEN: benign, soft, nontender, no masses. MUSCULOSKELETAL: normal gait, no masses  SKIN: normal texture and tone, no lesions    ASSESSMENT:   Menometrorrhagia    PLAN:   Pt counseled on progesterone therapy, ovulation predictor kits and expectant mgmt. PT agrees to expectant mgmt and if pt without regular menses or pregnancy after 6mos pt may consider provera.

## 2021-11-08 ENCOUNTER — OFFICE VISIT (OUTPATIENT)
Dept: OBGYN CLINIC | Age: 21
End: 2021-11-08
Payer: COMMERCIAL

## 2021-11-08 VITALS — DIASTOLIC BLOOD PRESSURE: 82 MMHG | WEIGHT: 241 LBS | BODY MASS INDEX: 36.64 KG/M2 | SYSTOLIC BLOOD PRESSURE: 124 MMHG

## 2021-11-08 DIAGNOSIS — Z11.3 SCREENING EXAMINATION FOR SEXUALLY TRANSMITTED DISEASE: ICD-10-CM

## 2021-11-08 DIAGNOSIS — Z32.01 POSITIVE URINE PREGNANCY TEST: ICD-10-CM

## 2021-11-08 DIAGNOSIS — N91.2 AMENORRHEA: ICD-10-CM

## 2021-11-08 DIAGNOSIS — N91.2 AMENORRHEA: Primary | ICD-10-CM

## 2021-11-08 DIAGNOSIS — Z32.01 PREGNANCY TEST POSITIVE: ICD-10-CM

## 2021-11-08 LAB — GONADOTROPIN, CHORIONIC (HCG) QUANT: NORMAL MIU/ML

## 2021-11-08 PROCEDURE — G8484 FLU IMMUNIZE NO ADMIN: HCPCS | Performed by: OBSTETRICS & GYNECOLOGY

## 2021-11-08 PROCEDURE — G8428 CUR MEDS NOT DOCUMENT: HCPCS | Performed by: OBSTETRICS & GYNECOLOGY

## 2021-11-08 PROCEDURE — 1036F TOBACCO NON-USER: CPT | Performed by: OBSTETRICS & GYNECOLOGY

## 2021-11-08 PROCEDURE — 36415 COLL VENOUS BLD VENIPUNCTURE: CPT | Performed by: OBSTETRICS & GYNECOLOGY

## 2021-11-08 PROCEDURE — 81025 URINE PREGNANCY TEST: CPT | Performed by: OBSTETRICS & GYNECOLOGY

## 2021-11-08 PROCEDURE — G8417 CALC BMI ABV UP PARAM F/U: HCPCS | Performed by: OBSTETRICS & GYNECOLOGY

## 2021-11-08 PROCEDURE — 99213 OFFICE O/P EST LOW 20 MIN: CPT | Performed by: OBSTETRICS & GYNECOLOGY

## 2021-11-08 RX ORDER — PNV NO.95/FERROUS FUM/FOLIC AC 28MG-0.8MG
TABLET ORAL
COMMUNITY
Start: 2021-10-26 | End: 2022-04-21

## 2021-11-08 RX ORDER — FLUOXETINE 10 MG/1
CAPSULE ORAL
COMMUNITY
Start: 2021-10-12 | End: 2022-03-24

## 2021-11-08 NOTE — PROGRESS NOTES
SUBJECTIVE:   19 y.o. [id number]  female here to discuss positive pregnancy test. Pt denies any VB and pt without complaints today. PT with h/o TSVD x 2 and MAB with D&C with last pregnancy (6/21). Review of Systems:  General ROS: negative  Respiratory ROS: no cough, shortness of breath, or wheezing  Cardiovascular ROS: no chest pain or dyspnea on exertion  Gastrointestinal ROS: no abdominal pain, change in bowel habits, or black or bloody stools  Genito-Urinary ROS: no dysuria, trouble voiding, or hematuria    OBJECTIVE:   /82   Wt 241 lb (109.3 kg)   LMP  (LMP Unknown)   BMI 36.64 kg/m²     Physical Exam:  GEN: She appears well, afebrile. HEENT: normal cephalic, atraumatic  CVS: regular rate and rhythm  ABDOMEN: benign, soft, nontender, no masses.   MUSCULOSKELETAL: normal gait, no masses  SKIN: normal texture and tone, no lesions    ASSESSMENT:   Positive pregnancy test    PLAN:   UPT positive  Hcg pending  US pending  PT to f/u for IPV in 4wks

## 2021-11-10 ENCOUNTER — HOSPITAL ENCOUNTER (OUTPATIENT)
Dept: ULTRASOUND IMAGING | Age: 21
Discharge: HOME | End: 2021-11-12
Payer: COMMERCIAL

## 2021-11-10 DIAGNOSIS — N91.2 AMENORRHEA: ICD-10-CM

## 2021-11-10 LAB
REASON FOR REJECTION: NORMAL
REJECTED TEST: NORMAL

## 2021-11-10 PROCEDURE — 76801 OB US < 14 WKS SINGLE FETUS: CPT

## 2021-11-10 PROCEDURE — 76817 TRANSVAGINAL US OBSTETRIC: CPT

## 2021-11-11 LAB
C. TRACHOMATIS DNA ,URINE: NEGATIVE
N. GONORRHOEAE DNA, URINE: NEGATIVE

## 2021-11-12 LAB
6-ACETYLMORPHINE: NOT DETECTED
7-AMINOCLONAZEPAM: NOT DETECTED
ALPHA-OH-ALPRAZOLAM: NOT DETECTED
ALPHA-OH-MIDAZOLAM, URINE: NOT DETECTED
ALPRAZOLAM: NOT DETECTED
AMPHETAMINE: NOT DETECTED
BARBITURATES: NOT DETECTED
BENZOYLECGONINE: NOT DETECTED
BUPRENORPHINE: NOT DETECTED
CARISOPRODOL: NOT DETECTED
CLONAZEPAM: NOT DETECTED
CODEINE: NOT DETECTED
CREATININE URINE: 240.9 MG/DL (ref 20–400)
DIAZEPAM: NOT DETECTED
EER PAIN MGT DRUG PANEL, HIGH RES/EMIT U: NORMAL
ETHYL GLUCURONIDE: NOT DETECTED
FENTANYL: NOT DETECTED
GABAPENTIN: NOT DETECTED
HYDROCODONE: NOT DETECTED
HYDROMORPHONE: NOT DETECTED
LORAZEPAM: NOT DETECTED
MARIJUANA METABOLITE: NOT DETECTED
MDA: NOT DETECTED
MDEA: NOT DETECTED
MDMA URINE: NOT DETECTED
MEPERIDINE: NOT DETECTED
METHADONE: NOT DETECTED
METHAMPHETAMINE: NOT DETECTED
METHYLPHENIDATE: NOT DETECTED
MIDAZOLAM: NOT DETECTED
MORPHINE: NOT DETECTED
NALOXONE: NOT DETECTED
NORBUPRENORPHINE, FREE: NOT DETECTED
NORDIAZEPAM: NOT DETECTED
NORFENTANYL: NOT DETECTED
NORHYDROCODONE, URINE: NOT DETECTED
NOROXYCODONE: NOT DETECTED
NOROXYMORPHONE, URINE: NOT DETECTED
OXAZEPAM: NOT DETECTED
OXYCODONE: NOT DETECTED
OXYMORPHONE: NOT DETECTED
PAIN MANAGEMENT DRUG PANEL: NORMAL
PCP: NOT DETECTED
PHENTERMINE: NOT DETECTED
PREGABALIN: NOT DETECTED
SPECIMEN SOURCE: NORMAL
T. VAGINALIS AMPLIFIED: NEGATIVE
TAPENTADOL, URINE: NOT DETECTED
TAPENTADOL-O-SULFATE, URINE: NOT DETECTED
TEMAZEPAM: NOT DETECTED
TRAMADOL: NOT DETECTED
ZOLPIDEM: NOT DETECTED

## 2021-12-06 ENCOUNTER — INITIAL PRENATAL (OUTPATIENT)
Dept: OBGYN CLINIC | Age: 21
End: 2021-12-06
Payer: COMMERCIAL

## 2021-12-06 VITALS
BODY MASS INDEX: 35.88 KG/M2 | SYSTOLIC BLOOD PRESSURE: 124 MMHG | DIASTOLIC BLOOD PRESSURE: 72 MMHG | HEART RATE: 84 BPM | WEIGHT: 236 LBS

## 2021-12-06 DIAGNOSIS — Z11.51 ENCOUNTER FOR SCREENING FOR HUMAN PAPILLOMAVIRUS (HPV): ICD-10-CM

## 2021-12-06 DIAGNOSIS — Z34.81 ENCOUNTER FOR SUPERVISION OF OTHER NORMAL PREGNANCY IN FIRST TRIMESTER: Primary | ICD-10-CM

## 2021-12-06 DIAGNOSIS — Z3A.10 10 WEEKS GESTATION OF PREGNANCY: ICD-10-CM

## 2021-12-06 DIAGNOSIS — Z34.81 ENCOUNTER FOR SUPERVISION OF OTHER NORMAL PREGNANCY IN FIRST TRIMESTER: ICD-10-CM

## 2021-12-06 LAB
BASOPHILS ABSOLUTE: 0 K/UL (ref 0–0.2)
BASOPHILS RELATIVE PERCENT: 0.2 %
EOSINOPHILS ABSOLUTE: 0 K/UL (ref 0–0.7)
EOSINOPHILS RELATIVE PERCENT: 0.6 %
HCT VFR BLD CALC: 38.2 % (ref 37–47)
HEMOGLOBIN: 12.1 G/DL (ref 12–16)
LYMPHOCYTES ABSOLUTE: 1.6 K/UL (ref 1–4.8)
LYMPHOCYTES RELATIVE PERCENT: 19.2 %
MCH RBC QN AUTO: 24.3 PG (ref 27–31.3)
MCHC RBC AUTO-ENTMCNC: 31.8 % (ref 33–37)
MCV RBC AUTO: 76.4 FL (ref 82–100)
MONOCYTES ABSOLUTE: 0.5 K/UL (ref 0.2–0.8)
MONOCYTES RELATIVE PERCENT: 6 %
NEUTROPHILS ABSOLUTE: 6.2 K/UL (ref 1.4–6.5)
NEUTROPHILS RELATIVE PERCENT: 74 %
PDW BLD-RTO: 18 % (ref 11.5–14.5)
PLATELET # BLD: 318 K/UL (ref 130–400)
RBC # BLD: 5 M/UL (ref 4.2–5.4)
RUBELLA ANTIBODY IGG: 32.4 IU/ML
WBC # BLD: 8.4 K/UL (ref 4.8–10.8)

## 2021-12-06 PROCEDURE — 36415 COLL VENOUS BLD VENIPUNCTURE: CPT | Performed by: OBSTETRICS & GYNECOLOGY

## 2021-12-06 PROCEDURE — 1036F TOBACCO NON-USER: CPT | Performed by: OBSTETRICS & GYNECOLOGY

## 2021-12-06 PROCEDURE — G8428 CUR MEDS NOT DOCUMENT: HCPCS | Performed by: OBSTETRICS & GYNECOLOGY

## 2021-12-06 PROCEDURE — G8417 CALC BMI ABV UP PARAM F/U: HCPCS | Performed by: OBSTETRICS & GYNECOLOGY

## 2021-12-06 PROCEDURE — G8484 FLU IMMUNIZE NO ADMIN: HCPCS | Performed by: OBSTETRICS & GYNECOLOGY

## 2021-12-06 PROCEDURE — 99214 OFFICE O/P EST MOD 30 MIN: CPT | Performed by: OBSTETRICS & GYNECOLOGY

## 2021-12-07 LAB
ABO/RH: NORMAL
ANTIBODY SCREEN: NORMAL
HIV AG/AB: NONREACTIVE
RPR: NORMAL

## 2021-12-08 LAB — URINE CULTURE, ROUTINE: NORMAL

## 2021-12-08 NOTE — TELEPHONE ENCOUNTER
Patient 10 weeks gestation, and requesting nausea medication and prenatal vitamins. Order pending, sign if appropriate.

## 2021-12-09 LAB — VZV IGG SER QL IA: 202.6 IV

## 2021-12-09 RX ORDER — ONDANSETRON 4 MG/1
4 TABLET, FILM COATED ORAL
Qty: 30 TABLET | Refills: 1 | Status: SHIPPED | OUTPATIENT
Start: 2021-12-09 | End: 2022-03-24

## 2021-12-09 RX ORDER — CALCIUM CITRATE, IRON PENTACARBONYL, CHOLECALCIFEROL, .ALPHA.-TOCOPHEROL, DL-, PYRIDOXINE HYDROCHLORIDE, FOLIC ACID, DOCUSATE SODIUM, AND DOCONEXENT 104; 27; 400; 30; 25; 1; 50; 260 MG/1; MG/1; [IU]/1; [IU]/1; MG/1; MG/1; MG/1; MG/1
1 CAPSULE, GELATIN COATED ORAL DAILY
Qty: 30 CAPSULE | Refills: 12 | Status: SHIPPED | OUTPATIENT
Start: 2021-12-09 | End: 2022-04-21

## 2021-12-10 LAB — HEPATITIS B SURFACE ANTIGEN CONFIRMATION: NORMAL

## 2021-12-11 LAB
HPV COMMENT: NORMAL
HPV TYPE 16: NOT DETECTED
HPV TYPE 18: NOT DETECTED
HPVOH (OTHER TYPES): NOT DETECTED

## 2021-12-15 PROCEDURE — 99283 EMERGENCY DEPT VISIT LOW MDM: CPT

## 2021-12-15 ASSESSMENT — PAIN DESCRIPTION - LOCATION: LOCATION: ABDOMEN

## 2021-12-15 ASSESSMENT — PAIN SCALES - GENERAL: PAINLEVEL_OUTOF10: 2

## 2021-12-15 ASSESSMENT — PAIN DESCRIPTION - PAIN TYPE: TYPE: ACUTE PAIN

## 2021-12-15 ASSESSMENT — PAIN DESCRIPTION - FREQUENCY: FREQUENCY: INTERMITTENT

## 2021-12-15 ASSESSMENT — PAIN DESCRIPTION - DESCRIPTORS: DESCRIPTORS: CRAMPING

## 2021-12-16 ENCOUNTER — HOSPITAL ENCOUNTER (EMERGENCY)
Age: 21
Discharge: HOME | End: 2021-12-16
Attending: EMERGENCY MEDICINE
Payer: COMMERCIAL

## 2021-12-16 VITALS
SYSTOLIC BLOOD PRESSURE: 138 MMHG | BODY MASS INDEX: 33.34 KG/M2 | TEMPERATURE: 98.4 F | HEART RATE: 80 BPM | DIASTOLIC BLOOD PRESSURE: 91 MMHG | WEIGHT: 220 LBS | HEIGHT: 68 IN | OXYGEN SATURATION: 98 % | RESPIRATION RATE: 20 BRPM

## 2021-12-16 DIAGNOSIS — O46.90 VAGINAL BLEEDING IN PREGNANCY: Primary | ICD-10-CM

## 2021-12-16 LAB
BACTERIA: ABNORMAL /HPF
BILIRUBIN URINE: NEGATIVE
BLOOD, URINE: ABNORMAL
CLARITY: CLEAR
COLOR: YELLOW
EPITHELIAL CELLS, UA: ABNORMAL /HPF (ref 0–5)
GLUCOSE URINE: NEGATIVE MG/DL
HCG, URINE, POC: POSITIVE
HYALINE CASTS: ABNORMAL /HPF (ref 0–5)
KETONES, URINE: ABNORMAL MG/DL
LEUKOCYTE ESTERASE, URINE: ABNORMAL
Lab: NORMAL
NEGATIVE QC PASS/FAIL: NORMAL
NITRITE, URINE: NEGATIVE
PH UA: 5.5 (ref 5–9)
POSITIVE QC PASS/FAIL: NORMAL
PROTEIN UA: NEGATIVE MG/DL
RBC UA: ABNORMAL /HPF (ref 0–5)
SPECIFIC GRAVITY UA: 1.02 (ref 1–1.03)
URINE REFLEX TO CULTURE: YES
UROBILINOGEN, URINE: 1 E.U./DL
WBC UA: ABNORMAL /HPF (ref 0–5)

## 2021-12-16 PROCEDURE — 81001 URINALYSIS AUTO W/SCOPE: CPT

## 2021-12-16 PROCEDURE — 87086 URINE CULTURE/COLONY COUNT: CPT

## 2021-12-16 NOTE — ED NOTES
Pt understands discharge instructions.   Pt instructed to follow up with PCP     Pt told to come back for new or worsening symptoms  No further questions         Casper Dubon RN  12/16/21 7412

## 2021-12-16 NOTE — ED PROVIDER NOTES
[facility] ED  EMERGENCY MEDICINE     Pt Name: Alejandra Lara  MRN: [id number]  [place] 2000  Date of evaluation: 12/15/2021  PCP:    Vivian Aguilar DO  Provider: Micaela Lopez, [address] [place] [address]       Chief Complaint   Patient presents with    Vaginal Bleeding     pt is 11 weeks and 2 days pregnant and started spotting and cramping tonight. pt states bleeding is light       HISTORY OF PRESENT ILLNESS    HPI     73-year-old female presents to the emergency department with concern for spotting vaginally. Patient is 11 weeks pregnant. She has had 2 miscarriages in the past and 2 live births. Was concerned with a slight spotting that she noticed today. Denies cramping. States that she has not had any urinary symptoms. Triage notes and Nursing notes were reviewed by myself. Any discrepancies are addressed above.     PAST MEDICAL HISTORY     Past Medical History:   Diagnosis Date    Anxiety     BMI (body mass index), pediatric, 85% to less than 95% for age 2014    Chronic hypertension affecting pregnancy     Class 1 obesity due to excess calories without serious comorbidity with body mass index (BMI) of 33.0 to 33.9 in adult 2019    Depression     hx of depression    Generalized abdominal pain 2017    Headache(784.0)     History of depression 2013    Hypertension      labor third trimester with  delivery third trimester 2019       SURGICAL HISTORY       Past Surgical History:   Procedure Laterality Date    WISDOM TOOTH EXTRACTION         CURRENT MEDICATIONS       Discharge Medication List as of 2021  3:08 AM      CONTINUE these medications which have NOT CHANGED    Details   ondansetron (ZOFRAN) 4 MG tablet Take 1 tablet by mouth every 4-6 hours as needed for Nausea or Vomiting, Disp-30 tablet, R-1Normal      Prenat-FeFmCb-DSS-FA-DHA w/o A (CITRANATAL HARMONY) 27-1-260 MG CAPS Take 1 tablet by mouth daily, Disp-30 capsule, R-12Normal      Prenatal Vit-Fe Fumarate-FA (PRENATAL VITAMINS) 28-0.8 MG TABS take 1 tablet by mouth once dailyHistorical Med      FLUoxetine (PROZAC) 10 MG capsule take 1 capsule by mouth once dailyHistorical Med             ALLERGIES     No Known Allergies    FAMILY HISTORY       Family History   Problem Relation Age of Onset    Cancer Mother     Arthritis Father     High Blood Pressure Father     Heart Disease Father     Heart Disease Maternal Grandmother     Diabetes Paternal Grandfather         SOCIAL HISTORY       Social History     Socioeconomic History    Marital status:      Spouse name: Not on file    Number of children: Not on file    Years of education: Not on file    Highest education level: Not on file   Occupational History    Not on file   Tobacco Use    Smoking status: Former Smoker    Smokeless tobacco: Never Used   Vaping Use    Vaping Use: Never used   Substance and Sexual Activity    Alcohol use: Yes    Drug use: No    Sexual activity: Yes     Partners: Male   Other Topics Concern    Not on file   Social History Narrative    ** Merged History Encounter **          Social Determinants of Health     Financial Resource Strain:     Difficulty of Paying Living Expenses: Not on file   Food Insecurity:     Worried About Running Out of Food in the Last Year: Not on file    Curtis of Food in the Last Year: Not on file   Transportation Needs:     Lack of Transportation (Medical): Not on file    Lack of Transportation (Non-Medical):  Not on file   Physical Activity:     Days of Exercise per Week: Not on file    Minutes of Exercise per Session: Not on file   Stress:     Feeling of Stress : Not on file   Social Connections:     Frequency of Communication with Friends and Family: Not on file    Frequency of Social Gatherings with Friends and Family: Not on file    Attends Nondenominational Services: Not on file    Active Member of Clubs or Organizations: Not on file    Attends Club or Organization Meetings: Not on file    Marital Status: Not on file   Intimate Partner Violence:     Fear of Current or Ex-Partner: Not on file    Emotionally Abused: Not on file    Physically Abused: Not on file    Sexually Abused: Not on file   Housing Stability:     Unable to Pay for Housing in the Last Year: Not on file    Number of [place] in the Last Year: Not on file    Unstable Housing in the Last Year: Not on file       REVIEW OF SYSTEMS     Review of Systems   Genitourinary: Positive for vaginal bleeding. Except as noted above the remainder of the review of systems was reviewed and is negative. SCREENINGS                        PHYSICAL EXAM    (up to 7 for level 4, 8 or more for level 5)     ED Triage Vitals [12/15/21 2341]   BP Temp Temp Source Pulse Resp SpO2 Height Weight   133/89 98.4 °F (36.9 °C) Tympanic 80 20 98 % 5' 8\" (1.727 m) 220 lb (99.8 kg)       Physical Exam  Constitutional:       General: She is not in acute distress. Appearance: Normal appearance. She is normal weight. She is not ill-appearing. HENT:      Head: Normocephalic and atraumatic. Right Ear: External ear normal.      Left Ear: External ear normal.      Nose: Nose normal. No congestion or rhinorrhea. Mouth/Throat:      Mouth: Mucous membranes are moist.      Pharynx: No oropharyngeal exudate or posterior oropharyngeal erythema. Eyes:      General:         Right eye: No discharge. Left eye: No discharge. Extraocular Movements: Extraocular movements intact. Pupils: Pupils are equal, round, and reactive to light. Cardiovascular:      Rate and Rhythm: Normal rate and regular rhythm. Pulses: Normal pulses. Pulmonary:      Effort: Pulmonary effort is normal.      Breath sounds: Normal breath sounds. Abdominal:      General: Abdomen is flat. Bowel sounds are normal. There is no distension. Tenderness: There is no abdominal tenderness.  There is no right CVA tenderness, left CVA tenderness, guarding or rebound. Musculoskeletal:         General: No swelling or tenderness. Normal range of motion. Cervical back: Normal range of motion and neck supple. Right lower leg: No edema. Left lower leg: No edema. Skin:     General: Skin is warm and dry. Capillary Refill: Capillary refill takes less than 2 seconds. Neurological:      General: No focal deficit present. Mental Status: She is alert. Psychiatric:         Mood and Affect: Mood normal.           DIAGNOSTIC RESULTS     EKG:(none if blank)  All EKGs are interpreted by the Emergency Department Physician who either signs or Co-signs this chart in the absence of a cardiologist.        RADIOLOGY: (none if blank)   I directly visualized the following images and reviewed the radiologist interpretations. Interpretation per the Radiologist below, if available at the time of this note:  No orders to display       LABS:  Labs Reviewed   URINE RT REFLEX TO CULTURE - Abnormal; Notable for the following components:       Result Value    Ketones, Urine TRACE (*)     Blood, Urine LARGE (*)     Leukocyte Esterase, Urine SMALL (*)     All other components within normal limits   MICROSCOPIC URINALYSIS   POC PREGNANCY UR-QUAL       All other labs were within normal range or not returned as of this dictation. Please note, any cultures that may have been sent were not resulted at the time of this patient visit. EMERGENCY DEPARTMENT COURSE and Medical Decision Making:     Vitals:    Vitals:    12/15/21 2341 12/16/21 0309 12/16/21 0312   BP: 133/89 (!) 138/91    Pulse: 80  80   Resp: 20     Temp: 98.4 °F (36.9 °C)     TempSrc: Tympanic     SpO2: 98%     Weight: 220 lb (99.8 kg)     Height: 5' 8\" (1.727 m)         PROCEDURES: (None if blank)  Procedures       MDM     Performed bedside ultrasound to assess fetus. Bedside ultrasound showed fetal heart rate of 167 bpm.  Appropriate size for age.   Patient does feel fine and just wanted to [facility] on baby\". Patient will be discharged in stable condition. Strict return precautions and follow up instructions were discussed with the patient with which the patient agrees    ED Medications administered this visit:  Medications - No data to display      FINAL IMPRESSION      1.  Vaginal bleeding in pregnancy          DISPOSITION/PLAN   DISPOSITION Decision To Discharge 12/16/2021 03:07:59 AM      PATIENT REFERRED TO:  Jennifer Perez DO  [facility] [phone]            DISCHARGE MEDICATIONS:  Discharge Medication List as of 12/16/2021  3:08 AM                 Brad Gonzalez DO (electronically signed)  Attending Physician, Emergency Department         Yariel Reese [phone]

## 2021-12-16 NOTE — ED TRIAGE NOTES
Pt states she was cooking tonight and started feeling lower abd cramping and saw bright red blood on the toilet paper when she wiped. Pt denies any injury. Pt has had 2 miscarriages in the past. Pt is currently 11wk and 2 days pregnant. Pt is a/o x 4 calm, skin p/w/d resp even and non-labored. No sob or acute distress noted.

## 2021-12-17 ENCOUNTER — ROUTINE PRENATAL (OUTPATIENT)
Dept: OBGYN CLINIC | Age: 21
End: 2021-12-17
Payer: COMMERCIAL

## 2021-12-17 ENCOUNTER — HOSPITAL ENCOUNTER (OUTPATIENT)
Dept: ULTRASOUND IMAGING | Age: 21
Discharge: HOME | End: 2021-12-19
Payer: COMMERCIAL

## 2021-12-17 VITALS
WEIGHT: 228 LBS | BODY MASS INDEX: 34.67 KG/M2 | HEART RATE: 82 BPM | DIASTOLIC BLOOD PRESSURE: 80 MMHG | SYSTOLIC BLOOD PRESSURE: 122 MMHG

## 2021-12-17 DIAGNOSIS — O20.9 FIRST TRIMESTER BLEEDING: ICD-10-CM

## 2021-12-17 DIAGNOSIS — Z3A.11 11 WEEKS GESTATION OF PREGNANCY: ICD-10-CM

## 2021-12-17 DIAGNOSIS — O20.0 THREATENED MISCARRIAGE IN EARLY PREGNANCY: ICD-10-CM

## 2021-12-17 DIAGNOSIS — Z34.81 ENCOUNTER FOR SUPERVISION OF OTHER NORMAL PREGNANCY IN FIRST TRIMESTER: Primary | ICD-10-CM

## 2021-12-17 LAB — URINE CULTURE, ROUTINE: NORMAL

## 2021-12-17 PROCEDURE — 99214 OFFICE O/P EST MOD 30 MIN: CPT | Performed by: ADVANCED PRACTICE MIDWIFE

## 2021-12-17 PROCEDURE — G8427 DOCREV CUR MEDS BY ELIG CLIN: HCPCS | Performed by: ADVANCED PRACTICE MIDWIFE

## 2021-12-17 PROCEDURE — 1036F TOBACCO NON-USER: CPT | Performed by: ADVANCED PRACTICE MIDWIFE

## 2021-12-17 PROCEDURE — 76801 OB US < 14 WKS SINGLE FETUS: CPT

## 2021-12-17 PROCEDURE — G8484 FLU IMMUNIZE NO ADMIN: HCPCS | Performed by: ADVANCED PRACTICE MIDWIFE

## 2021-12-17 PROCEDURE — G8417 CALC BMI ABV UP PARAM F/U: HCPCS | Performed by: ADVANCED PRACTICE MIDWIFE

## 2021-12-17 PROCEDURE — H1000 PRENATAL CARE ATRISK ASSESSM: HCPCS | Performed by: ADVANCED PRACTICE MIDWIFE

## 2021-12-17 ASSESSMENT — ENCOUNTER SYMPTOMS
VOMITING: 0
DIARRHEA: 0
ABDOMINAL PAIN: 0
NAUSEA: 0
CONSTIPATION: 0
SHORTNESS OF BREATH: 0

## 2021-12-17 NOTE — PROGRESS NOTES
SUBJECTIVE:  Presented to the ER on 12/16/21 with new onset of light vaginal bleeding. Continues to have intermittent light bleeding alternated with brown discharge. Fearful of repeat miscarriage. States a bedside US was performed in the ER and fetal heart tones were present. Review of Systems   Eyes: Negative for visual disturbance. Respiratory: Negative for shortness of breath. Gastrointestinal: Negative for abdominal pain, constipation, diarrhea, nausea and vomiting. Genitourinary: Positive for vaginal bleeding. Negative for dysuria and vaginal discharge. Neurological: Negative for headaches. OBJECTIVE:  Physical Exam  Constitutional:       General: She is not in acute distress. Appearance: Normal appearance. Cardiovascular:      Rate and Rhythm: Normal rate and regular rhythm. Pulmonary:      Effort: Pulmonary effort is normal. No respiratory distress. Abdominal:      Palpations: Abdomen is soft. Tenderness: There is no abdominal tenderness. Hernia: There is no hernia in the left inguinal area or right inguinal area. Genitourinary:     Labia:         Right: No rash, tenderness, lesion or injury. Left: No rash, tenderness, lesion or injury. Urethra: No prolapse, urethral pain, urethral swelling or urethral lesion. Vagina: No signs of injury and foreign body. Vaginal discharge present. No erythema, tenderness, bleeding, lesions or prolapsed vaginal walls. Cervix: No cervical motion tenderness, discharge, friability, lesion, erythema, cervical bleeding or eversion. Uterus: Not tender. Rectum: No mass or external hemorrhoid. Musculoskeletal:         General: No swelling. Right lower leg: No edema. Left lower leg: No edema. Lymphadenopathy:      Lower Body: No right inguinal adenopathy. No left inguinal adenopathy. Skin:     General: Skin is warm and dry.       Capillary Refill: Capillary refill takes less than 2 seconds. Neurological:      Mental Status: She is alert and oriented to person, place, and time. Mental status is at baseline. Sensory: No sensory deficit. Deep Tendon Reflexes: Reflexes normal.   Psychiatric:         Mood and Affect: Mood normal.         Behavior: Behavior normal.       ASSESSMENT:  19 y.o. female [id number] IUP at 11w4d     Diagnosis Orders   1. Encounter for supervision of other normal pregnancy in first trimester  US OB TRANSVAGINAL   2. 11 weeks gestation of pregnancy  US OB TRANSVAGINAL   3. First trimester bleeding  US OB LESS THAN 14 WEEKS SINGLE OR FIRST GESTATION    US OB TRANSVAGINAL    Wet Prep, Genital    C.trachomatis N.gonorrhoeae DNA   4. Threatened miscarriage in early pregnancy  US OB LESS THAN 14 WEEKS SINGLE OR FIRST GESTATION    US OB TRANSVAGINAL       PLAN:  FHT could not be obtained by doppler  Obtain US to confirm viability  Vaginal cultures collected, treat if indicated     Follow-Up  Return for clinic will call to schedule follow-up.     ANNEL Lanier CNM

## 2021-12-18 ENCOUNTER — APPOINTMENT (OUTPATIENT)
Dept: ULTRASOUND IMAGING | Age: 21
End: 2021-12-18
Payer: COMMERCIAL

## 2021-12-18 ENCOUNTER — HOSPITAL ENCOUNTER (EMERGENCY)
Age: 21
Discharge: HOME | End: 2021-12-18
Payer: COMMERCIAL

## 2021-12-18 VITALS
HEIGHT: 68 IN | BODY MASS INDEX: 36.07 KG/M2 | RESPIRATION RATE: 16 BRPM | DIASTOLIC BLOOD PRESSURE: 78 MMHG | OXYGEN SATURATION: 100 % | TEMPERATURE: 98 F | HEART RATE: 72 BPM | SYSTOLIC BLOOD PRESSURE: 118 MMHG | WEIGHT: 238 LBS

## 2021-12-18 DIAGNOSIS — O03.9 MISCARRIAGE: Primary | ICD-10-CM

## 2021-12-18 DIAGNOSIS — O20.9 FIRST TRIMESTER BLEEDING: ICD-10-CM

## 2021-12-18 LAB
ALBUMIN SERPL-MCNC: 4.1 G/DL (ref 3.5–4.6)
ALP BLD-CCNC: 82 U/L (ref 40–130)
ALT SERPL-CCNC: 25 U/L (ref 0–33)
ANION GAP SERPL CALCULATED.3IONS-SCNC: 13 MEQ/L (ref 9–15)
AST SERPL-CCNC: 17 U/L (ref 0–35)
BACTERIA: NEGATIVE /HPF
BASOPHILS ABSOLUTE: 0 K/UL (ref 0–0.2)
BASOPHILS RELATIVE PERCENT: 0.3 %
BILIRUB SERPL-MCNC: <0.2 MG/DL (ref 0.2–0.7)
BILIRUBIN URINE: NEGATIVE
BLOOD, URINE: ABNORMAL
BUN BLDV-MCNC: 9 MG/DL (ref 6–20)
CALCIUM SERPL-MCNC: 9.1 MG/DL (ref 8.5–9.9)
CHLORIDE BLD-SCNC: 97 MEQ/L (ref 95–107)
CLARITY: ABNORMAL
CLUE CELLS: NORMAL
CO2: 21 MEQ/L (ref 20–31)
COLOR: YELLOW
CREAT SERPL-MCNC: 0.53 MG/DL (ref 0.5–0.9)
CRYSTALS, UA: ABNORMAL /HPF
EOSINOPHILS ABSOLUTE: 0.1 K/UL (ref 0–0.7)
EOSINOPHILS RELATIVE PERCENT: 0.6 %
EPITHELIAL CELLS, UA: ABNORMAL /HPF (ref 0–5)
GFR AFRICAN AMERICAN: >60
GFR NON-AFRICAN AMERICAN: >60
GLOBULIN: 3.6 G/DL (ref 2.3–3.5)
GLUCOSE BLD-MCNC: 127 MG/DL (ref 70–99)
GLUCOSE URINE: NEGATIVE MG/DL
GONADOTROPIN, CHORIONIC (HCG) QUANT: NORMAL MIU/ML
HCG, URINE, POC: POSITIVE
HCT VFR BLD CALC: 38.3 % (ref 37–47)
HEMOGLOBIN: 12.6 G/DL (ref 12–16)
HYALINE CASTS: ABNORMAL /LPF (ref 0–5)
KETONES, URINE: ABNORMAL MG/DL
LEUKOCYTE ESTERASE, URINE: NEGATIVE
LYMPHOCYTES ABSOLUTE: 1.8 K/UL (ref 1–4.8)
LYMPHOCYTES RELATIVE PERCENT: 16.3 %
Lab: NORMAL
MCH RBC QN AUTO: 24.5 PG (ref 27–31.3)
MCHC RBC AUTO-ENTMCNC: 32.8 % (ref 33–37)
MCV RBC AUTO: 74.6 FL (ref 82–100)
MONOCYTES ABSOLUTE: 0.7 K/UL (ref 0.2–0.8)
MONOCYTES RELATIVE PERCENT: 6.1 %
MUCUS: PRESENT /LPF
NEGATIVE QC PASS/FAIL: NORMAL
NEUTROPHILS ABSOLUTE: 8.6 K/UL (ref 1.4–6.5)
NEUTROPHILS RELATIVE PERCENT: 76.7 %
NITRITE, URINE: NEGATIVE
PDW BLD-RTO: 16.9 % (ref 11.5–14.5)
PH UA: 5.5 (ref 5–9)
PLATELET # BLD: 291 K/UL (ref 130–400)
POSITIVE QC PASS/FAIL: NORMAL
POTASSIUM SERPL-SCNC: 3.7 MEQ/L (ref 3.4–4.9)
PROTEIN UA: 30 MG/DL
RBC # BLD: 5.13 M/UL (ref 4.2–5.4)
RBC UA: ABNORMAL /HPF (ref 0–5)
SODIUM BLD-SCNC: 131 MEQ/L (ref 135–144)
SPECIFIC GRAVITY UA: 1.03 (ref 1–1.03)
TOTAL PROTEIN: 7.7 G/DL (ref 6.3–8)
TRICHOMONAS PREP: NORMAL
TRICHOMONAS VAGINALIS SCREEN: NEGATIVE
URINE REFLEX TO CULTURE: ABNORMAL
UROBILINOGEN, URINE: 1 E.U./DL
WBC # BLD: 11.2 K/UL (ref 4.8–10.8)
WBC UA: ABNORMAL /HPF (ref 0–5)
YEAST WET PREP: NORMAL

## 2021-12-18 PROCEDURE — 84702 CHORIONIC GONADOTROPIN TEST: CPT

## 2021-12-18 PROCEDURE — 76817 TRANSVAGINAL US OBSTETRIC: CPT

## 2021-12-18 PROCEDURE — 76801 OB US < 14 WKS SINGLE FETUS: CPT

## 2021-12-18 PROCEDURE — 36415 COLL VENOUS BLD VENIPUNCTURE: CPT

## 2021-12-18 PROCEDURE — 80053 COMPREHEN METABOLIC PANEL: CPT

## 2021-12-18 PROCEDURE — 6370000000 HC RX 637 (ALT 250 FOR IP): Performed by: PHYSICIAN ASSISTANT

## 2021-12-18 PROCEDURE — 81001 URINALYSIS AUTO W/SCOPE: CPT

## 2021-12-18 PROCEDURE — 99284 EMERGENCY DEPT VISIT MOD MDM: CPT

## 2021-12-18 PROCEDURE — 85025 COMPLETE CBC W/AUTO DIFF WBC: CPT

## 2021-12-18 RX ORDER — SODIUM CHLORIDE 9 MG/ML
INJECTION, SOLUTION INTRAVENOUS
Status: DISCONTINUED
Start: 2021-12-18 | End: 2021-12-18 | Stop reason: HOSPADM

## 2021-12-18 RX ORDER — ACETAMINOPHEN 500 MG
1000 TABLET ORAL ONCE
Status: COMPLETED | OUTPATIENT
Start: 2021-12-18 | End: 2021-12-18

## 2021-12-18 RX ADMIN — ACETAMINOPHEN 1000 MG: 500 TABLET ORAL at 03:16

## 2021-12-18 ASSESSMENT — ENCOUNTER SYMPTOMS
VOMITING: 0
BACK PAIN: 0
RHINORRHEA: 0
NAUSEA: 0
PHOTOPHOBIA: 0
SHORTNESS OF BREATH: 0
SORE THROAT: 0
ABDOMINAL PAIN: 0
DIARRHEA: 0
EYE PAIN: 0
COUGH: 0

## 2021-12-18 ASSESSMENT — PAIN SCALES - GENERAL
PAINLEVEL_OUTOF10: 2
PAINLEVEL_OUTOF10: 0

## 2021-12-18 NOTE — ED NOTES
Discharge instructions reviewed with patient. Patient denies any further questions at this time. Pt encouraged to make follow up appointments with PCP and any speciality referrals.  home provided to this nurse for burial service of conception remains. Pt tolerated news well. All paperwork signed and given to . Nursing supervisor called for transport of remains to [facility]. Pt understands procedure and all questions have been answered.         Radha Conte RN  [phone]

## 2021-12-18 NOTE — ED NOTES
Received report from previous nurse. Agree with assessment and triage. No distress noted. Nurse will continue to monitor.       Nilton Elizabeth RN  12/18/21 1869

## 2021-12-18 NOTE — ED NOTES
PURPLE BEREAVEMENT FOLDER GIVEN TO PT, PT AND FAMILY COUNSELED AND EDUCATED ON POLICY OF GESTATIONAL LOSS BY THIS RN, ALL QUESTIONS ANSWERED AT [address], RN  12/18/21 0673

## 2021-12-18 NOTE — ED NOTES
Pt offered  services by this RN and pt declined at this time      Maricarmen Morris, RN  12/18/21 510 Haily Valdez, RN  12/18/21 6718

## 2021-12-18 NOTE — ED NOTES
PT TO ULTRASOUND, PT REQUESTING TO KEEP FETUS IN THE ROOM AT [address] [facility] [address], RN  12/18/21 1217

## 2021-12-18 NOTE — ED PROVIDER NOTES
[address] [facility] ED  eMERGENCY dEPARTMENTeNCOUnter      Pt Name: Mohamud Wing  MRN: [id number]  [place] 2000  Date ofevaluation: 12/18/2021  Provider: Ashely Holt PA-C    CHIEF COMPLAINT       Chief Complaint   Patient presents with    Miscarriage         HISTORY OF PRESENT ILLNESS   (Location/Symptom, Timing/Onset,Context/Setting, Quality, Duration, Modifying Factors, Severity)  Note limiting factors. Mohamud Wing is a 19 y.o. female who presents to the emergency department vaginal bleeding. Pt believes she is having a miscarriage. She passed what she believes is the baby as well as large clots. Seen her ob this morning and had fht. She reports pelvic cramping for 2 days. Denies fevers. HPI    NursingNotes were reviewed. REVIEW OF SYSTEMS    (2-9 systems for level 4, 10 or more for level 5)     Review of Systems   Constitutional: Negative for chills, diaphoresis, fatigue and fever. HENT: Negative for congestion, rhinorrhea and sore throat. Eyes: Negative for photophobia and pain. Respiratory: Negative for cough and shortness of breath. Cardiovascular: Negative for chest pain and palpitations. Gastrointestinal: Negative for abdominal pain, diarrhea, nausea and vomiting. Genitourinary: Positive for pelvic pain and vaginal bleeding. Negative for dysuria and flank pain. Musculoskeletal: Negative for back pain. Skin: Negative for rash. Neurological: Negative for dizziness, light-headedness and headaches. Psychiatric/Behavioral: Negative. All other systems reviewed and are negative. Except as noted above the remainder of the review of systems was reviewed and negative.        PAST MEDICAL HISTORY     Past Medical History:   Diagnosis Date    Anxiety     BMI (body mass index), pediatric, 85% to less than 95% for age 7/23/2014    Chronic hypertension affecting pregnancy     Class 1 obesity due to excess calories without serious comorbidity with body mass index (BMI) of 33.0 to 33.9 in adult 2019    Depression     hx of depression    Generalized abdominal pain 2017    Headache(784.0)     History of depression 2013    Hypertension      labor third trimester with  delivery third trimester 2019         SURGICALHISTORY       Past Surgical History:   Procedure Laterality Date    WISDOM TOOTH EXTRACTION           CURRENT MEDICATIONS       Previous Medications    FLUOXETINE (PROZAC) 10 MG CAPSULE    take 1 capsule by mouth once daily    ONDANSETRON (ZOFRAN) 4 MG TABLET    Take 1 tablet by mouth every 4-6 hours as needed for Nausea or Vomiting    PRENAT-FEFMCB-DSS-FA-DHA W/O A (CITRANATAL HARMONY) 27-1-260 MG CAPS    Take 1 tablet by mouth daily    PRENATAL VIT-FE FUMARATE-FA (PRENATAL VITAMINS) 28-0.8 MG TABS    take 1 tablet by mouth once daily       ALLERGIES     Patient has no known allergies. FAMILY HISTORY       Family History   Problem Relation Age of Onset    Cancer Mother     Arthritis Father     High Blood Pressure Father     Heart Disease Father     Heart Disease Maternal Grandmother     Diabetes Paternal Grandfather           SOCIAL HISTORY       Social History     Socioeconomic History    Marital status:      Spouse name: None    Number of children: None    Years of education: None    Highest education level: None   Occupational History    None   Tobacco Use    Smoking status: Former Smoker    Smokeless tobacco: Never Used   Vaping Use    Vaping Use: Never used   Substance and Sexual Activity    Alcohol use:  Yes    Drug use: No    Sexual activity: Yes     Partners: Male   Other Topics Concern    None   Social History Narrative    ** Merged History Encounter **          Social Determinants of Health     Financial Resource Strain:     Difficulty of Paying Living Expenses: Not on file   Food Insecurity:     Worried About Running Out of Food in the Last Year: Not on file    [employer] in the Last Year: Not on file   Transportation Needs:     Lack of Transportation (Medical): Not on file    Lack of Transportation (Non-Medical): Not on file   Physical Activity:     Days of Exercise per Week: Not on file    Minutes of Exercise per Session: Not on file   Stress:     Feeling of Stress : Not on file   Social Connections:     Frequency of Communication with Friends and Family: Not on file    Frequency of Social Gatherings with Friends and Family: Not on file    Attends Yarsanism Services: Not on file    Active Member of [address] [place] [address] [employer] or Organizations: Not on file    Attends Club or Organization Meetings: Not on file    Marital Status: Not on file   Intimate Partner Violence:     Fear of Current or Ex-Partner: Not on file    Emotionally Abused: Not on file    Physically Abused: Not on file    Sexually Abused: Not on file   Housing Stability:     Unable to Pay for Housing in the Last Year: Not on file    Number of [place] in the Last Year: Not on file    Unstable Housing in the Last Year: Not on file       SCREENINGS      @FLOW([id number])@      PHYSICAL EXAM    (up to 7 for level 4, 8 or more for level 5)     ED Triage Vitals [12/18/21 0237]   BP Temp Temp Source Pulse Resp SpO2 Height Weight   137/74 98 °F (36.7 °C) Oral 87 18 99 % 5' 8\" (1.727 m) 238 lb (108 kg)       Physical Exam  Vitals and nursing note reviewed. Constitutional:       General: She is not in acute distress. Appearance: Normal appearance. She is well-developed. She is not diaphoretic. HENT:      Head: Normocephalic and atraumatic. Eyes:      General: Lids are normal.      Conjunctiva/sclera: Conjunctivae normal.   Cardiovascular:      Rate and Rhythm: Normal rate and regular rhythm. Pulses: Normal pulses. Heart sounds: Normal heart sounds. Pulmonary:      Effort: Pulmonary effort is normal.      Breath sounds: Normal breath sounds.    Abdominal:      General: Bowel sounds are normal.      Palpations: Abdomen is soft. Tenderness: There is abdominal tenderness in the suprapubic area. Musculoskeletal:      Cervical back: Normal range of motion and neck supple. Lymphadenopathy:      Cervical: No cervical adenopathy. Skin:     General: Skin is warm and dry. Capillary Refill: Capillary refill takes less than 2 seconds. Findings: No rash. Neurological:      Mental Status: She is alert and oriented to person, place, and time. Psychiatric:         Thought Content: Thought content normal.         Judgment: Judgment normal.         DIAGNOSTIC RESULTS     EKG: All EKG's are interpreted by the Emergency Department Physician who either signs or Co-signsthis chart in the absence of a cardiologist.        RADIOLOGY:   Cass Daubs such as CT, Ultrasound and MRI are read by the radiologist. Plain radiographic images are visualized and preliminarily interpreted by the emergency physician with the below findings:        Interpretation per the Radiologist below, if available at the time ofthis note:    US OB TRANSVAGINAL    (Results Pending)   US OB LESS THAN 14 WEEKS SINGLE OR FIRST GESTATION    (Results Pending)     Ultrasound shows no intrauterine gestational sac, endometrial thickening measuring 12 mm without significant interval vascularity retained products of conception not excluded.     ED BEDSIDE ULTRASOUND:   Performed by ED Physician - none    LABS:  Labs Reviewed   COMPREHENSIVE METABOLIC PANEL - Abnormal; Notable for the following components:       Result Value    Sodium 131 (*)     Glucose 127 (*)     Globulin 3.6 (*)     All other components within normal limits   CBC WITH AUTO DIFFERENTIAL - Abnormal; Notable for the following components:    WBC 11.2 (*)     MCV 74.6 (*)     MCH 24.5 (*)     MCHC 32.8 (*)     RDW 16.9 (*)     Neutrophils Absolute 8.6 (*)     All other components within normal limits   URINE RT REFLEX TO CULTURE - Abnormal; Notable for the following components: Clarity, UA CLOUDY (*)     Ketones, Urine TRACE (*)     Blood, Urine SMALL (*)     Protein, UA 30 (*)     All other components within normal limits   MICROSCOPIC URINALYSIS - Abnormal; Notable for the following components:    Crystals, UA 3+ Ca. Oxalate (*)     WBC, UA 6-9 (*)     RBC, UA 10-20 (*)     All other components within normal limits   HCG, QUANTITATIVE, PREGNANCY   POC PREGNANCY UR-QUAL       All other labs were within normal range or not returned as of this dictation. EMERGENCY DEPARTMENT COURSE and DIFFERENTIAL DIAGNOSIS/MDM:   Vitals:    Vitals:    21 0237 21 0504 21 0631   BP: 137/74  118/78   Pulse: 87 80 72   Resp: 18 16 16   Temp: 98 °F (36.7 °C)     TempSrc: Oral     SpO2: 99% 98% 100%   Weight: 238 lb (108 kg)     Height: 5' 8\" (1.727 m)             MDM  Number of Diagnoses or Management Options  Miscarriage  Diagnosis management comments: Pt blood type A+      Patient approximately 11 weeks pregnant, she presents emergency department complaint of pelvic cramping which she states been ongoing for the last 2 days. She had just been seen by her OB/GYN earlier today today, she began having increasing cramping, and passed what she believes was the fetus as well as additional placental tissue. Patient states that she has had 3 previous miscarriages in the past.  Discussed the final results of the ultrasound with the patient and her family, the fetal contents are present in the room. Patient states that she wants to have these preserved, and wants to make arrangements for cremation. Forms were filled out and signed by patient. . The fetal contents were packaged, and taken to the morgue where they were remain until family makes arrangements with  home for services. REASSESSMENT          CRITICAL CARE TIME   Total Critical Care time was 0 minutes, excluding separatelyreportable procedures.   There was a high probability ofclinically significant/life threatening deterioration in the patient's condition which required my urgent intervention. CONSULTS:  None    PROCEDURES:  Unless otherwise noted below, none     Procedures    FINAL IMPRESSION      1.  Miscarriage          DISPOSITION/PLAN   DISPOSITION        PATIENT REFERREDTO:  DO Fantasma Hassan [phone]    In 1 week      Nawaf Ellis MD  [address] [facility], [address] [place] [address] Drive  [phone]    In 3 days        DISCHARGEMEDICATIONS:  New Prescriptions    No medications on file          (Please note that portions of this note were completed with a voice recognition program.  Efforts were made to edit the dictations but occasionally words are mis-transcribed.)    Alesha Quezada PA-C (electronically signed)  Attending Emergency Physician          Alesha Quezada PA-C  12/18/21 [address] [facility] Adelso York PA-C  12/18/21 4810

## 2021-12-20 ENCOUNTER — TELEPHONE (OUTPATIENT)
Dept: OBGYN CLINIC | Age: 21
End: 2021-12-20

## 2021-12-22 LAB
C TRACH DNA GENITAL QL NAA+PROBE: NEGATIVE
N. GONORRHOEAE DNA: NEGATIVE

## 2021-12-27 NOTE — TELEPHONE ENCOUNTER
Called pt back, no answer, left detailed message on vm informing previous message from Dr Consuelo Roy and to keep upcoming scheduled appointment.

## 2022-03-24 ENCOUNTER — OFFICE VISIT (OUTPATIENT)
Dept: OBGYN CLINIC | Age: 22
End: 2022-03-24
Payer: COMMERCIAL

## 2022-03-24 VITALS
BODY MASS INDEX: 37.71 KG/M2 | SYSTOLIC BLOOD PRESSURE: 112 MMHG | WEIGHT: 248 LBS | DIASTOLIC BLOOD PRESSURE: 64 MMHG | HEART RATE: 97 BPM

## 2022-03-24 DIAGNOSIS — Z34.81 ENCOUNTER FOR SUPERVISION OF OTHER NORMAL PREGNANCY IN FIRST TRIMESTER: Primary | ICD-10-CM

## 2022-03-24 DIAGNOSIS — Z34.81 ENCOUNTER FOR SUPERVISION OF OTHER NORMAL PREGNANCY IN FIRST TRIMESTER: ICD-10-CM

## 2022-03-24 LAB
AMPHETAMINE SCREEN, URINE: NORMAL
BACTERIA: ABNORMAL /HPF
BARBITURATE SCREEN URINE: NORMAL
BENZODIAZEPINE SCREEN, URINE: NORMAL
BILIRUBIN URINE: NEGATIVE
BLOOD, URINE: NEGATIVE
CANNABINOID SCREEN URINE: NORMAL
CLARITY: CLEAR
COCAINE METABOLITE SCREEN URINE: NORMAL
COLOR: YELLOW
EPITHELIAL CELLS, UA: ABNORMAL /HPF (ref 0–5)
GLUCOSE URINE: NEGATIVE MG/DL
HYALINE CASTS: ABNORMAL /LPF (ref 0–5)
KETONES, URINE: NEGATIVE MG/DL
LEUKOCYTE ESTERASE, URINE: ABNORMAL
Lab: NORMAL
METHADONE SCREEN, URINE: NORMAL
NITRITE, URINE: NEGATIVE
OPIATE SCREEN URINE: NORMAL
OXYCODONE URINE: NORMAL
PH UA: 7.5 (ref 5–9)
PHENCYCLIDINE SCREEN URINE: NORMAL
PROPOXYPHENE SCREEN: NORMAL
PROTEIN UA: NEGATIVE MG/DL
RBC UA: ABNORMAL /HPF (ref 0–5)
SPECIFIC GRAVITY UA: 1.02 (ref 1–1.03)
UROBILINOGEN, URINE: 1 E.U./DL
WBC UA: ABNORMAL /HPF (ref 0–5)

## 2022-03-24 PROCEDURE — 99214 OFFICE O/P EST MOD 30 MIN: CPT | Performed by: OBSTETRICS & GYNECOLOGY

## 2022-03-24 PROCEDURE — G8484 FLU IMMUNIZE NO ADMIN: HCPCS | Performed by: OBSTETRICS & GYNECOLOGY

## 2022-03-24 PROCEDURE — G8427 DOCREV CUR MEDS BY ELIG CLIN: HCPCS | Performed by: OBSTETRICS & GYNECOLOGY

## 2022-03-24 PROCEDURE — G8417 CALC BMI ABV UP PARAM F/U: HCPCS | Performed by: OBSTETRICS & GYNECOLOGY

## 2022-03-24 PROCEDURE — 1036F TOBACCO NON-USER: CPT | Performed by: OBSTETRICS & GYNECOLOGY

## 2022-03-24 RX ORDER — PNV,CALCIUM 72/IRON/FOLIC ACID 27 MG-1 MG
1 TABLET ORAL DAILY
Qty: 30 TABLET | Refills: 11 | Status: ON HOLD | OUTPATIENT
Start: 2022-03-24 | End: 2022-08-23 | Stop reason: HOSPADM

## 2022-03-24 ASSESSMENT — ENCOUNTER SYMPTOMS
TROUBLE SWALLOWING: 0
ABDOMINAL PAIN: 0
CONSTIPATION: 0
COLOR CHANGE: 0
SORE THROAT: 0
COUGH: 0
NAUSEA: 0
VOICE CHANGE: 0
CHEST TIGHTNESS: 0
BACK PAIN: 0
ABDOMINAL DISTENTION: 0
WHEEZING: 0
BLOOD IN STOOL: 0
VOMITING: 0
SHORTNESS OF BREATH: 0

## 2022-03-24 NOTE — PROGRESS NOTES
HPI:  Avery Mahtias (: 2000) is a 19 y.o. female, Established patient, here for evaluation of the following chief complaint(s):  Amenorrhea (LMP unknown. 2 recent sabs)  Patient here for prenatal visit. She is a 75-year-old  6 para 2-0-3-2. Her last 2 pregnancies resulted in early spontaneous loss. He has had 2 full-term spontaneous vaginal deliveries. Uncertain last menstrual period positive nausea and vomiting has had Zofran which is working well. No bleeding      SUBJECTIVE/OBJECTIVE:    Past Surgical History:   Procedure Laterality Date    TOOTH EXTRACTION      WISDOM TOOTH EXTRACTION          Review of Systems   Constitutional: Negative for activity change, appetite change, fatigue and unexpected weight change. HENT: Negative for dental problem, ear pain, hearing loss, nosebleeds, sore throat, trouble swallowing and voice change. Eyes: Negative for visual disturbance. Respiratory: Negative for cough, chest tightness, shortness of breath and wheezing. Cardiovascular: Negative for chest pain and palpitations. Gastrointestinal: Negative for abdominal distention, abdominal pain, blood in stool, constipation, nausea and vomiting. Endocrine: Negative for cold intolerance, heat intolerance, polydipsia, polyphagia and polyuria. Genitourinary: Negative for difficulty urinating, dyspareunia, dysuria, flank pain, frequency, genital sores, hematuria, menstrual problem, pelvic pain, urgency, vaginal bleeding, vaginal discharge and vaginal pain. Musculoskeletal: Negative for arthralgias, back pain, joint swelling and myalgias. Skin: Negative for color change and rash. Allergic/Immunologic: Negative for environmental allergies, food allergies and immunocompromised state. Neurological: Negative for dizziness, seizures, syncope, speech difficulty, weakness, numbness and headaches. Hematological: Negative for adenopathy. Does not bruise/bleed easily.    Psychiatric/Behavioral: Negative for agitation, behavioral problems, confusion, decreased concentration, dysphoric mood and suicidal ideas. The patient is not nervous/anxious and is not hyperactive. Physical Exam  Vitals and nursing note reviewed. Constitutional:       Appearance: She is well-developed. HENT:      Head: Normocephalic and atraumatic. Eyes:      Pupils: Pupils are equal, round, and reactive to light. Neck:      Thyroid: No thyromegaly. Trachea: No tracheal deviation. Cardiovascular:      Rate and Rhythm: Normal rate and regular rhythm. Heart sounds: Normal heart sounds. Pulmonary:      Effort: Pulmonary effort is normal.      Breath sounds: Normal breath sounds. Chest:      Chest wall: No tenderness. Abdominal:      General: Bowel sounds are normal. There is no distension. Palpations: Abdomen is soft. There is no mass. Tenderness: There is no abdominal tenderness. There is no guarding or rebound. Hernia: There is no hernia in the left inguinal area. Genitourinary:     Labia:         Right: No rash, tenderness, lesion or injury. Left: No rash, tenderness, lesion or injury. Vagina: Normal. No foreign body. No vaginal discharge, erythema, tenderness or bleeding. Cervix: No cervical motion tenderness or discharge. Uterus: Not deviated, not enlarged, not fixed and not tender. Adnexa:         Right: No mass, tenderness or fullness. Left: No mass, tenderness or fullness. Rectum: Normal. No mass, tenderness, anal fissure, external hemorrhoid or internal hemorrhoid. Normal anal tone. Musculoskeletal:         General: Normal range of motion. Cervical back: Normal range of motion. Lymphadenopathy:      Cervical: No cervical adenopathy. Neurological:      Mental Status: She is alert and oriented to person, place, and time.          Vitals:    03/24/22 1312   BP: 112/64   Pulse: 97   Weight: 248 lb (112.5 kg) ASSESSMENT/PLAN:    Amenorrhea visit  First trimester viable pregnancy    PLAN: Patient to have an ultrasound done at the hospital to confirm the gestational age. Patient to follow-up here in 4 weeks. Can do panorama and carrier screening at 10 weeks. And labs and prenatal vital      No follow-ups on file. On this date 3/24/2022 I have spent 30 minutes reviewing previous notes, test results and face to face with the patient discussing the diagnosis and importance of compliance with the treatment plan as well as documenting on the day of the visit. An electronic signature was used to authenticate this note. Please note this report has been partially produced using speech recognition software and may cause contain errors related to that system including grammar, punctuation and spelling as well as words and phrases that may seem inappropriate. If there are questions or concerns please feel free to contact me to clarify.

## 2022-03-26 LAB — URINE CULTURE, ROUTINE: NORMAL

## 2022-03-28 ENCOUNTER — HOSPITAL ENCOUNTER (OUTPATIENT)
Dept: ULTRASOUND IMAGING | Age: 22
Discharge: HOME | End: 2022-03-30
Payer: COMMERCIAL

## 2022-03-28 ENCOUNTER — APPOINTMENT (OUTPATIENT)
Dept: ULTRASOUND IMAGING | Age: 22
End: 2022-03-28
Payer: COMMERCIAL

## 2022-03-28 DIAGNOSIS — Z34.81 ENCOUNTER FOR SUPERVISION OF OTHER NORMAL PREGNANCY IN FIRST TRIMESTER: ICD-10-CM

## 2022-03-28 PROCEDURE — 76817 TRANSVAGINAL US OBSTETRIC: CPT

## 2022-03-28 PROCEDURE — 76801 OB US < 14 WKS SINGLE FETUS: CPT

## 2022-03-30 DIAGNOSIS — Z34.81 ENCOUNTER FOR SUPERVISION OF OTHER NORMAL PREGNANCY IN FIRST TRIMESTER: ICD-10-CM

## 2022-03-30 LAB
BASOPHILS ABSOLUTE: 0 K/UL (ref 0–0.2)
BASOPHILS RELATIVE PERCENT: 0.3 %
C. TRACHOMATIS DNA,THIN PREP: NEGATIVE
EOSINOPHILS ABSOLUTE: 0 K/UL (ref 0–0.7)
EOSINOPHILS RELATIVE PERCENT: 0.6 %
GONADOTROPIN, CHORIONIC (HCG) QUANT: NORMAL MIU/ML
HBA1C MFR BLD: 5.2 % (ref 4.8–5.9)
HCT VFR BLD CALC: 38.5 % (ref 37–47)
HEMOGLOBIN: 12.7 G/DL (ref 12–16)
HEPATITIS B SURFACE ANTIGEN INTERPRETATION: NORMAL
LYMPHOCYTES ABSOLUTE: 1.5 K/UL (ref 1–4.8)
LYMPHOCYTES RELATIVE PERCENT: 21.7 %
MCH RBC QN AUTO: 25.6 PG (ref 27–31.3)
MCHC RBC AUTO-ENTMCNC: 33 % (ref 33–37)
MCV RBC AUTO: 77.4 FL (ref 82–100)
MONOCYTES ABSOLUTE: 0.5 K/UL (ref 0.2–0.8)
MONOCYTES RELATIVE PERCENT: 7 %
N. GONORRHOEAE DNA, THIN PREP: NEGATIVE
NEUTROPHILS ABSOLUTE: 4.7 K/UL (ref 1.4–6.5)
NEUTROPHILS RELATIVE PERCENT: 70.4 %
PDW BLD-RTO: 15.1 % (ref 11.5–14.5)
PLATELET # BLD: 332 K/UL (ref 130–400)
RBC # BLD: 4.98 M/UL (ref 4.2–5.4)
RUBELLA ANTIBODY IGG: 32.5 IU/ML
TSH SERPL DL<=0.05 MIU/L-ACNC: 1.48 UIU/ML (ref 0.44–3.86)
WBC # BLD: 6.7 K/UL (ref 4.8–10.8)

## 2022-03-31 LAB
ABO/RH: NORMAL
ANTIBODY SCREEN: NORMAL
HEPATITIS C ANTIBODY: NONREACTIVE
HIV AG/AB: NONREACTIVE
RPR: NORMAL

## 2022-04-01 LAB
SICKLE CELL SCREEN: NEGATIVE
VZV IGG SER QL IA: 222.8 IV

## 2022-04-07 ENCOUNTER — PATIENT MESSAGE (OUTPATIENT)
Dept: OBGYN CLINIC | Age: 22
End: 2022-04-07

## 2022-04-07 DIAGNOSIS — Z34.81 ENCOUNTER FOR SUPERVISION OF OTHER NORMAL PREGNANCY IN FIRST TRIMESTER: Primary | ICD-10-CM

## 2022-04-08 NOTE — TELEPHONE ENCOUNTER
From: Nessa Mosqueda  To: Dr. Xavi Berman: 4/7/2022 7:58 PM EDT  Subject: Question regarding VARICELLA ZOSTER IGG    Can I get the Gender blood test done ?  Im 10 weeks and 2 days

## 2022-04-14 ENCOUNTER — APPOINTMENT (OUTPATIENT)
Dept: GENERAL RADIOLOGY | Age: 22
End: 2022-04-14
Payer: COMMERCIAL

## 2022-04-14 ENCOUNTER — HOSPITAL ENCOUNTER (EMERGENCY)
Age: 22
Discharge: HOME | End: 2022-04-14
Attending: STUDENT IN AN ORGANIZED HEALTH CARE EDUCATION/TRAINING PROGRAM
Payer: COMMERCIAL

## 2022-04-14 VITALS
RESPIRATION RATE: 16 BRPM | SYSTOLIC BLOOD PRESSURE: 121 MMHG | HEART RATE: 93 BPM | BODY MASS INDEX: 36.98 KG/M2 | TEMPERATURE: 98.3 F | DIASTOLIC BLOOD PRESSURE: 78 MMHG | HEIGHT: 68 IN | WEIGHT: 244 LBS | OXYGEN SATURATION: 97 %

## 2022-04-14 DIAGNOSIS — O26.899 PELVIC CRAMPING IN ANTEPARTUM PERIOD: ICD-10-CM

## 2022-04-14 DIAGNOSIS — R10.2 PELVIC CRAMPING IN ANTEPARTUM PERIOD: ICD-10-CM

## 2022-04-14 DIAGNOSIS — R07.89 CHEST WALL PAIN: ICD-10-CM

## 2022-04-14 DIAGNOSIS — G54.0 THORACIC OUTLET SYNDROME: Primary | ICD-10-CM

## 2022-04-14 LAB
BILIRUBIN URINE: NEGATIVE
BLOOD, URINE: NEGATIVE
CLARITY: CLEAR
COLOR: YELLOW
GLUCOSE URINE: NEGATIVE MG/DL
KETONES, URINE: ABNORMAL MG/DL
LEUKOCYTE ESTERASE, URINE: NEGATIVE
NITRITE, URINE: NEGATIVE
PH UA: 5.5 (ref 5–9)
PROTEIN UA: NEGATIVE MG/DL
SPECIFIC GRAVITY UA: 1.03 (ref 1–1.03)
URINE REFLEX TO CULTURE: ABNORMAL
UROBILINOGEN, URINE: 1 E.U./DL

## 2022-04-14 PROCEDURE — 71045 X-RAY EXAM CHEST 1 VIEW: CPT

## 2022-04-14 PROCEDURE — 93005 ELECTROCARDIOGRAM TRACING: CPT | Performed by: STUDENT IN AN ORGANIZED HEALTH CARE EDUCATION/TRAINING PROGRAM

## 2022-04-14 PROCEDURE — 99283 EMERGENCY DEPT VISIT LOW MDM: CPT

## 2022-04-14 PROCEDURE — 81003 URINALYSIS AUTO W/O SCOPE: CPT

## 2022-04-14 RX ORDER — ACETAMINOPHEN 500 MG
1000 TABLET ORAL 4 TIMES DAILY PRN
Qty: 120 TABLET | Refills: 0 | Status: SHIPPED | OUTPATIENT
Start: 2022-04-14 | End: 2022-09-26

## 2022-04-14 ASSESSMENT — ENCOUNTER SYMPTOMS
SINUS PRESSURE: 0
TROUBLE SWALLOWING: 0
ABDOMINAL PAIN: 0
CHEST TIGHTNESS: 0
BACK PAIN: 0
COUGH: 0
VOMITING: 0
DIARRHEA: 0
SHORTNESS OF BREATH: 0

## 2022-04-14 ASSESSMENT — PAIN DESCRIPTION - DESCRIPTORS: DESCRIPTORS: SHARP

## 2022-04-14 ASSESSMENT — PAIN SCALES - GENERAL: PAINLEVEL_OUTOF10: 4

## 2022-04-14 ASSESSMENT — PAIN DESCRIPTION - ORIENTATION: ORIENTATION: LEFT

## 2022-04-14 ASSESSMENT — PAIN DESCRIPTION - FREQUENCY: FREQUENCY: INTERMITTENT

## 2022-04-14 ASSESSMENT — PAIN DESCRIPTION - LOCATION: LOCATION: CHEST

## 2022-04-14 ASSESSMENT — PAIN DESCRIPTION - PAIN TYPE: TYPE: ACUTE PAIN;CHRONIC PAIN

## 2022-04-14 ASSESSMENT — PAIN - FUNCTIONAL ASSESSMENT: PAIN_FUNCTIONAL_ASSESSMENT: 0-10

## 2022-04-14 NOTE — Clinical Note
Rosine Hashimoto was seen and treated in our emergency department on 4/14/2022. She may return to work on 04/16/2022. If you have any questions or concerns, please don't hesitate to call.       [address] [facility], DO

## 2022-04-15 LAB
EKG ATRIAL RATE: 73 BPM
EKG P AXIS: 37 DEGREES
EKG P-R INTERVAL: 170 MS
EKG Q-T INTERVAL: 402 MS
EKG QRS DURATION: 96 MS
EKG QTC CALCULATION (BAZETT): 442 MS
EKG R AXIS: 16 DEGREES
EKG T AXIS: 18 DEGREES
EKG VENTRICULAR RATE: 73 BPM

## 2022-04-15 NOTE — ED PROVIDER NOTES
[facility] ED  eMERGENCY dEPARTMENT eNCOUnter      Pt Name: Jose Vance  MRN: [id number]  [place] 2000  Date of evaluation: 4/14/2022  Provider: Jaun Garza DO    CHIEF COMPLAINT       Chief Complaint   Patient presents with    Abdominal Pain     11 weeks pregnant    Chest Pain     left side, sharp, intermittent x several months         HISTORY OF PRESENT ILLNESS   (Location/Symptom, Timing/Onset,Context/Setting, Quality, Duration, Modifying Factors, Severity)  Note limiting factors. Jose Vance is a 19 y.o. female who presents to the emergency department plaint of left-sided chest pain that has been present for several months. Position change, reaching, and sometimes even sleep position because the exact same pain. On physical exam the patient is depressed first rib on the left. Patient has reproducible tenderness to palpation of the costal sternal border on the left. Patient denies any fever, chills or cough. Patient is 11 weeks pregnant. States that she has been drinking a lot more soda pop lately and not nearly as much water and has had some cramping. Patient denies any vaginal bleeding. Patient states if she can get her chest to feel better she would not have been in the emergency room today. Patient denies any recent long distance travel. She denies smoking. Patient denies any history of blood clots. The history is provided by the patient, the spouse and a relative. NursingNotes were reviewed. REVIEW OF SYSTEMS    (2-9 systems for level 4, 10 or more for level 5)     Review of Systems   Constitutional: Negative for activity change, appetite change, chills, fever and unexpected weight change. HENT: Negative for drooling, ear pain, nosebleeds, sinus pressure and trouble swallowing. Respiratory: Negative for cough, chest tightness and shortness of breath. Cardiovascular: Positive for chest pain. Negative for leg swelling.    Gastrointestinal: Negative for abdominal pain, diarrhea and vomiting. Endocrine: Negative for polydipsia and polyphagia. Genitourinary: Negative for dysuria, flank pain and frequency. Musculoskeletal: Negative for back pain and myalgias. Skin: Negative for pallor and rash. Neurological: Negative for syncope, weakness and headaches. Hematological: Does not bruise/bleed easily. All other systems reviewed and are negative. Except as noted above the remainder of the review of systems was reviewed and negative. PAST MEDICAL HISTORY     Past Medical History:   Diagnosis Date    Anxiety     BMI (body mass index), pediatric, 85% to less than 95% for age 2014    Chronic hypertension affecting pregnancy     Class 1 obesity due to excess calories without serious comorbidity with body mass index (BMI) of 33.0 to 33.9 in adult 2019    Depression     hx of depression    Generalized abdominal pain 2017    Headache(784.0)     History of depression 2013    Hypertension      labor third trimester with  delivery third trimester 2019         SURGICALHISTORY       Past Surgical History:   Procedure Laterality Date    TOOTH EXTRACTION      WISDOM TOOTH EXTRACTION           CURRENT MEDICATIONS       Discharge Medication List as of 2022 10:06 PM      CONTINUE these medications which have NOT CHANGED    Details   Prenatal Vit-Fe Fumarate-FA (PREPLUS) 27-1 MG TABS Take 1 tablet by mouth daily, Disp-30 tablet, R-11Normal      Prenat-FeFmCb-DSS-FA-DHA w/o A (CITRANATAL HARMONY) 27-1-260 MG CAPS Take 1 tablet by mouth daily, Disp-30 capsule, R-12Normal      Prenatal Vit-Fe Fumarate-FA (PRENATAL VITAMINS) 28-0.8 MG TABS take 1 tablet by mouth once dailyHistorical Med             ALLERGIES     Patient has no known allergies.     FAMILY HISTORY       Family History   Problem Relation Age of Onset    Cancer Mother     Arthritis Father     High Blood Pressure Father     Heart Disease Father     Heart Disease Maternal Grandmother     Diabetes Paternal Grandfather           SOCIAL HISTORY       Social History     Socioeconomic History    Marital status:      Spouse name: None    Number of children: None    Years of education: None    Highest education level: None   Occupational History    None   Tobacco Use    Smoking status: Former Smoker    Smokeless tobacco: Never Used   Vaping Use    Vaping Use: Never used   Substance and Sexual Activity    Alcohol use: Not Currently    Drug use: No    Sexual activity: Yes     Partners: Male   Other Topics Concern    None   Social History Narrative    ** Merged History Encounter **          Social Determinants of Health     Financial Resource Strain:     Difficulty of Paying Living Expenses: Not on file   Food Insecurity:     Worried About Running Out of Food in the Last Year: Not on file    Curtis of Food in the Last Year: Not on file   Transportation Needs:     Lack of Transportation (Medical): Not on file    Lack of Transportation (Non-Medical):  Not on file   Physical Activity:     Days of Exercise per Week: Not on file    Minutes of Exercise per Session: Not on file   Stress:     Feeling of Stress : Not on file   Social Connections:     Frequency of Communication with Friends and Family: Not on file    Frequency of Social Gatherings with Friends and Family: Not on file    Attends [employer] Services: Not on file    Active Member of [address] [place] [address] or Organizations: Not on file    Attends Club or Organization Meetings: Not on file    Marital Status: Not on file   Intimate Partner Violence:     Fear of Current or Ex-Partner: Not on file    Emotionally Abused: Not on file    Physically Abused: Not on file    Sexually Abused: Not on file   Housing Stability:     Unable to Pay for Housing in the Last Year: Not on file    Number of [place] in the Last Year: Not on file    Unstable Housing in the Last Year: Not on file SCREENINGS    Baltazar Coma Scale  Eye Opening: Spontaneous  Best Verbal Response: Oriented  Best Motor Response: Obeys commands  Baltazar Coma Scale Score: 15 @FLOW([id number])@      PHYSICAL EXAM    (up to 7 for level 4, 8 or more for level 5)     ED Triage Vitals [04/14/22 2038]   BP Temp Temp Source Pulse Resp SpO2 Height Weight   121/78 98.3 °F (36.8 °C) Oral 93 16 97 % 5' 8\" (1.727 m) 244 lb (110.7 kg)       Physical Exam  Vitals and nursing note reviewed. Exam conducted with a chaperone present. Constitutional:       General: She is awake. She is not in acute distress. Appearance: Normal appearance. She is well-developed and normal weight. She is not ill-appearing, toxic-appearing or diaphoretic. Comments: No photophobia. No phonophobia. HENT:      Head: Normocephalic and atraumatic. No Haro's sign. Right Ear: External ear normal.      Left Ear: External ear normal.      Nose: Nose normal. No congestion or rhinorrhea. Mouth/Throat:      Mouth: Mucous membranes are moist.      Pharynx: Oropharynx is clear. No oropharyngeal exudate or posterior oropharyngeal erythema. Eyes:      General: No scleral icterus. Right eye: No foreign body or discharge. Left eye: No discharge. Extraocular Movements: Extraocular movements intact. Conjunctiva/sclera: Conjunctivae normal.      Left eye: No exudate. Pupils: Pupils are equal, round, and reactive to light. Neck:      Vascular: No JVD. Trachea: No tracheal deviation. Comments: No meningismus. Cardiovascular:      Rate and Rhythm: Normal rate and regular rhythm. Heart sounds: Normal heart sounds. Heart sounds not distant. No murmur heard. No friction rub. No gallop. Pulmonary:      Effort: Pulmonary effort is normal. No respiratory distress. Breath sounds: Normal breath sounds. No stridor. No wheezing, rhonchi or rales. Chest:      Chest wall: Tenderness present. No crepitus. Abdominal:      General: Abdomen is flat. Bowel sounds are normal. There is no distension. Palpations: Abdomen is soft. There is no mass. Tenderness: There is no abdominal tenderness. There is no right CVA tenderness, left CVA tenderness, guarding or rebound. Negative signs include Wiggins's sign and McBurney's sign. Hernia: No hernia is present. Musculoskeletal:         General: No swelling, tenderness, deformity or signs of injury. Normal range of motion. Cervical back: Normal range of motion and neck supple. No rigidity. Lymphadenopathy:      Head:      Right side of head: No submental adenopathy. Left side of head: No submental adenopathy. Skin:     General: Skin is warm and dry. Capillary Refill: Capillary refill takes less than 2 seconds. Coloration: Skin is not jaundiced or pale. Findings: No bruising, erythema, lesion or rash. Neurological:      General: No focal deficit present. Mental Status: She is alert and oriented to person, place, and time. Mental status is at baseline. Cranial Nerves: No cranial nerve deficit. Sensory: No sensory deficit. Motor: No weakness. Coordination: Coordination normal.      Deep Tendon Reflexes: Reflexes are normal and symmetric. Psychiatric:         Mood and Affect: Mood normal.         Behavior: Behavior normal. Behavior is cooperative. Thought Content: Thought content normal.         Judgment: Judgment normal.         DIAGNOSTIC RESULTS     EKG: All EKG's are interpreted by the Emergency Department Physician who either signs or Co-signsthis chart in the absence of a cardiologist.    EKG: Normal sinus rhythm at 73 bpm.  Normal axis. Normal ST segments. QT intervals were 102 ms. No PVCs.     RADIOLOGY:   Non-plain filmimages such as CT, Ultrasound and MRI are read by the radiologist. Plain radiographic images are visualized and preliminarily interpreted by the emergency physician with the below findings:    Chest x-ray: No infiltrate, no pleural effusion, no pneumothorax, no free air. Interpretation per the Radiologist below, if available at the time ofthis note:    XR CHEST PORTABLE    (Results Pending)         ED BEDSIDE ULTRASOUND:   Performed by ED Physician - none    LABS:  Labs Reviewed   URINALYSIS WITH REFLEX TO CULTURE - Abnormal; Notable for the following components:       Result Value    Ketones, Urine TRACE (*)     All other components within normal limits       All other labs were within normal range or not returned as of this dictation. EMERGENCY DEPARTMENT COURSE and DIFFERENTIAL DIAGNOSIS/MDM:   Vitals:    Vitals:    04/14/22 2038   BP: 121/78   Pulse: 93   Resp: 16   Temp: 98.3 °F (36.8 °C)   TempSrc: Oral   SpO2: 97%   Weight: 244 lb (110.7 kg)   Height: 5' 8\" (1.727 m)           MDM  Risk versus benefit of high velocity, low amplitude manipulation (HVLA) were discussed with the patient. Contraindications were reviewed. Patient denies contraindications. Patient gives verbal consent to HVLA. The ED attending performed HVLA to the patient's cervical and thoracic spine. On reexam the depressed first rib on the left has resolved. The patient now has symmetric and equal first rib heights. The pain is completely relieved. Analysis indicates no urinary tract infection. Patient had very dark appearing urine the ER physician explained to the patient with her being pregnant she should consume roughly 1 gallon of water spaced throughout the day. Follow-up with OB/GYN tomorrow  The findings were discussed with the patient. The patient was invited to return  to the ER if worse symptoms. The patient verbalized understanding of the care and they have no further questions. CONSULTS:  None    PROCEDURES:  Unless otherwise noted below, none     Procedures    FINAL IMPRESSION      1. Thoracic outlet syndrome    2. Chest wall pain    3.  Pelvic cramping in antepartum period

## 2022-04-15 NOTE — ED TRIAGE NOTES
Patient c/o intermittent sharp left side chest pain x several months. Intermittent low abdominal pain/cramping x 1 day. Patient denies bleeding. Patient 11 weeks pregnant.

## 2022-04-21 ENCOUNTER — INITIAL PRENATAL (OUTPATIENT)
Dept: OBGYN CLINIC | Age: 22
End: 2022-04-21
Payer: COMMERCIAL

## 2022-04-21 VITALS
SYSTOLIC BLOOD PRESSURE: 104 MMHG | WEIGHT: 228 LBS | HEART RATE: 84 BPM | BODY MASS INDEX: 34.67 KG/M2 | DIASTOLIC BLOOD PRESSURE: 62 MMHG

## 2022-04-21 DIAGNOSIS — B37.31 VULVOVAGINITIS DUE TO YEAST: ICD-10-CM

## 2022-04-21 DIAGNOSIS — Z34.81 ENCOUNTER FOR SUPERVISION OF OTHER NORMAL PREGNANCY IN FIRST TRIMESTER: Primary | ICD-10-CM

## 2022-04-21 PROCEDURE — 1036F TOBACCO NON-USER: CPT | Performed by: OBSTETRICS & GYNECOLOGY

## 2022-04-21 PROCEDURE — 99213 OFFICE O/P EST LOW 20 MIN: CPT | Performed by: OBSTETRICS & GYNECOLOGY

## 2022-04-21 PROCEDURE — G8427 DOCREV CUR MEDS BY ELIG CLIN: HCPCS | Performed by: OBSTETRICS & GYNECOLOGY

## 2022-04-21 PROCEDURE — G8417 CALC BMI ABV UP PARAM F/U: HCPCS | Performed by: OBSTETRICS & GYNECOLOGY

## 2022-04-21 RX ORDER — CEFDINIR 300 MG/1
CAPSULE ORAL
Status: ON HOLD | COMMUNITY
Start: 2022-04-18 | End: 2022-08-23 | Stop reason: HOSPADM

## 2022-04-21 RX ORDER — CLOTRIMAZOLE 1 %
CREAM WITH APPLICATOR VAGINAL
Status: ON HOLD | COMMUNITY
Start: 2022-04-18 | End: 2022-08-23 | Stop reason: HOSPADM

## 2022-04-21 NOTE — PROGRESS NOTES
Patient's last menstrual period was 01/22/2022 (approximate). Please reference prenatal and OB flow chart for further information  PT here today for routine prenatal care  Pt endorses fetal movement and denies loss of fluid, contractions or vaginal bleeding  Has vulvar irritation and burning. no dysuria.   ROS:  Pt denies headache, vision changes, right upper quadrant pain, dysuria, or nausea/vomiting,     PE:  /62   Pulse 84   Wt 228 lb (103.4 kg)   LMP 01/22/2022 (Approximate)   BMI 34.67 kg/m²   Gen - Alert and oriented x 3  HEENT- NC/AT, CVS - RRR, Lungs - CTAB  Abd - FH     Yeast vulvovaginitis      ASSESSMENT AND PLAN:   IUP at 12 weeks and 5 days  Yeast vulvovaginitis  Patient to follow-up here in 4 weeks  Terazol 7

## 2022-04-22 LAB
CLUE CELLS: ABNORMAL
TRICHOMONAS PREP: ABNORMAL
TRICHOMONAS VAGINALIS SCREEN: NEGATIVE
YEAST WET PREP: ABNORMAL

## 2022-05-19 ENCOUNTER — ROUTINE PRENATAL (OUTPATIENT)
Dept: OBGYN CLINIC | Age: 22
End: 2022-05-19
Payer: COMMERCIAL

## 2022-05-19 VITALS
WEIGHT: 222 LBS | HEART RATE: 89 BPM | DIASTOLIC BLOOD PRESSURE: 76 MMHG | SYSTOLIC BLOOD PRESSURE: 110 MMHG | BODY MASS INDEX: 33.75 KG/M2

## 2022-05-19 DIAGNOSIS — Z34.92 PRENATAL CARE, SECOND TRIMESTER: Primary | ICD-10-CM

## 2022-05-19 PROCEDURE — 99213 OFFICE O/P EST LOW 20 MIN: CPT | Performed by: OBSTETRICS & GYNECOLOGY

## 2022-05-19 PROCEDURE — G8417 CALC BMI ABV UP PARAM F/U: HCPCS | Performed by: OBSTETRICS & GYNECOLOGY

## 2022-05-19 PROCEDURE — 1036F TOBACCO NON-USER: CPT | Performed by: OBSTETRICS & GYNECOLOGY

## 2022-05-19 PROCEDURE — G8428 CUR MEDS NOT DOCUMENT: HCPCS | Performed by: OBSTETRICS & GYNECOLOGY

## 2022-05-19 NOTE — PROGRESS NOTES
Patient's last menstrual period was 01/22/2022 (approximate). Please referen no complaints ce prenatal and OB flow chart for further information  PT here today for routine prenatal care  Pt endorses fetal movement and denies loss of fluid, contractions or vaginal bleeding  No complaints. 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 Her  will come in and get that blood work drawn.   NIPT low risk male  ROS:  Pt denies headache, vision changes, right upper quadrant pain, dysuria, or nausea/vomiting,     PE:  /76   Pulse 89   Wt 222 lb (100.7 kg)   LMP 01/22/2022 (Approximate)   BMI 33.75 kg/m²   Gen - Alert and oriented x 3  HEENT- NC/AT, CVS - RRR, Lungs - CTAB  Abd - FH 16        ASSESSMENT AND PLAN:   IUP at 16 weeks and 5 days  Patient to follow-up in 4 weeks  Patient to have an ultrasound done at 20 weeks

## 2022-05-27 NOTE — PROGRESS NOTES
SUBJECTIVE:   19 y.o. [id number] female here for amenorrhea and new ob. Pt denies any VB and is tolerating po daily. Pt taking PNV. Pt with h/o TSVD x 2. Pt with early [address] with ARNULFO c/w 7/4/22. Review of Systems:  General ROS: negative  Psychological ROS: negative  ENT ROS: negative  Endocrine ROS: negative  Respiratory ROS: no cough, shortness of breath, or wheezing  Cardiovascular ROS: no chest pain or dyspnea on exertion  Gastrointestinal ROS: no abdominal pain, change in bowel habits, or black or bloody stools  Genito-Urinary ROS: no dysuria, trouble voiding, or hematuria  Musculoskeletal ROS: negative  Neurological ROS: no TIA or stroke symptoms  Dermatological ROS: negative    OBJECTIVE:   /72   Pulse 84   Wt 236 lb (107 kg)   LMP  (LMP Unknown)   BMI 35.88 kg/m²     Physical Exam:  GEN: She appears well, afebrile. HEENT: normal cephalic, atraumatic  CVS: regular rate and rhythm  ABDOMEN: benign, soft, nontender, no masses. MUSCULOSKELETAL: normal gait, no masses  SKIN: normal texture and tone, no lesions  NEURO: normal tone, no hyperreflexia, 1+DTRs throughout    Pelvic Exam:   EFG: normal external genitalia  URETHRA: normal appearing without diverticula or lesions  VULVA: normal appearing vulva with no masses, tenderness or lesions  VAGINA: normal rugae, no discharge   CERVIX: parous, no lesions  UTERUS: uterus is normal shape, consistency and nontender - 10wks  ADNEXA: normal adnexa in size, nontender and no masses. PERINEUM: normal appearing without lesions or masses  ANUS: normal appearing without lesions or masses, no fissures or hemorrhoids    ASSESSMENT:   Supervision of pregnancy    PLAN:   Pap with GC/Chlam today  PN labs pending  US for anatomy at 18-20wks  Past medical, social and family history reviewed and updated in pt's chart. no

## 2022-06-01 ENCOUNTER — HOSPITAL ENCOUNTER (OUTPATIENT)
Dept: ULTRASOUND IMAGING | Age: 22
Discharge: HOME | End: 2022-06-03
Payer: COMMERCIAL

## 2022-06-01 DIAGNOSIS — Z34.92 PRENATAL CARE, SECOND TRIMESTER: ICD-10-CM

## 2022-06-01 PROCEDURE — 76817 TRANSVAGINAL US OBSTETRIC: CPT

## 2022-06-01 PROCEDURE — 76805 OB US >/= 14 WKS SNGL FETUS: CPT

## 2022-06-22 RX ORDER — FERROUS SULFATE 325(65) MG
TABLET ORAL
Status: ON HOLD | COMMUNITY
Start: 2022-05-05 | End: 2022-08-23 | Stop reason: HOSPADM

## 2022-06-22 RX ORDER — DOCUSATE SODIUM 100 MG/1
CAPSULE, LIQUID FILLED ORAL
Status: ON HOLD | COMMUNITY
Start: 2022-05-05 | End: 2022-08-23 | Stop reason: HOSPADM

## 2022-06-23 ENCOUNTER — ROUTINE PRENATAL (OUTPATIENT)
Dept: OBGYN CLINIC | Age: 22
End: 2022-06-23
Payer: COMMERCIAL

## 2022-06-23 VITALS
HEART RATE: 89 BPM | SYSTOLIC BLOOD PRESSURE: 104 MMHG | WEIGHT: 221 LBS | BODY MASS INDEX: 33.6 KG/M2 | DIASTOLIC BLOOD PRESSURE: 62 MMHG

## 2022-06-23 DIAGNOSIS — Z34.92 PRENATAL CARE, SECOND TRIMESTER: Primary | ICD-10-CM

## 2022-06-23 PROCEDURE — 99213 OFFICE O/P EST LOW 20 MIN: CPT | Performed by: OBSTETRICS & GYNECOLOGY

## 2022-06-23 PROCEDURE — G8428 CUR MEDS NOT DOCUMENT: HCPCS | Performed by: OBSTETRICS & GYNECOLOGY

## 2022-06-23 PROCEDURE — 1036F TOBACCO NON-USER: CPT | Performed by: OBSTETRICS & GYNECOLOGY

## 2022-06-23 PROCEDURE — G8417 CALC BMI ABV UP PARAM F/U: HCPCS | Performed by: OBSTETRICS & GYNECOLOGY

## 2022-06-23 NOTE — PROGRESS NOTES
Patient's last menstrual period was 01/22/2022 (approximate). Please reference prenatal and OB flow chart for further information  PT here today for routine prenatal care  Pt endorses fetal movement and denies loss of fluid, contractions or vaginal bleeding  Prenatal visit at 21 weeks and 5 days. Her only complaint is that she is tired. Ultrasound done at 18+ weeks multiple some visualized areas.   Patient also states that her  is unwilling to be tested for the carrier status for SMA  ROS:  Pt denies headache, vision changes, right upper quadrant pain, dysuria, or nausea/vomiting,     PE:  /62   Pulse 89   Wt 221 lb (100.2 kg)   LMP 01/22/2022 (Approximate)   BMI 33.60 kg/m²   Gen - Alert and oriented x 3  HEENT- NC/AT, CVS - RRR, Lungs - CTAB  Abd - FH 21        ASSESSMENT AND PLAN:   21 weeks and 5 days  Patient to have repeat ultrasound for some visualized areas  Patient to follow-up in 4 weeks

## 2022-07-21 ENCOUNTER — HOSPITAL ENCOUNTER (OUTPATIENT)
Dept: ULTRASOUND IMAGING | Age: 22
Discharge: HOME | End: 2022-07-23
Payer: COMMERCIAL

## 2022-07-21 DIAGNOSIS — Z34.92 PRENATAL CARE, SECOND TRIMESTER: ICD-10-CM

## 2022-07-21 PROCEDURE — 76805 OB US >/= 14 WKS SNGL FETUS: CPT

## 2022-08-09 ENCOUNTER — ROUTINE PRENATAL (OUTPATIENT)
Dept: OBGYN CLINIC | Age: 22
End: 2022-08-09
Payer: COMMERCIAL

## 2022-08-09 VITALS
WEIGHT: 224 LBS | DIASTOLIC BLOOD PRESSURE: 84 MMHG | BODY MASS INDEX: 34.06 KG/M2 | SYSTOLIC BLOOD PRESSURE: 120 MMHG | HEART RATE: 95 BPM

## 2022-08-09 DIAGNOSIS — Z34.92 PRENATAL CARE, SECOND TRIMESTER: Primary | ICD-10-CM

## 2022-08-09 PROCEDURE — G8417 CALC BMI ABV UP PARAM F/U: HCPCS | Performed by: OBSTETRICS & GYNECOLOGY

## 2022-08-09 PROCEDURE — 99213 OFFICE O/P EST LOW 20 MIN: CPT | Performed by: OBSTETRICS & GYNECOLOGY

## 2022-08-09 PROCEDURE — 1036F TOBACCO NON-USER: CPT | Performed by: OBSTETRICS & GYNECOLOGY

## 2022-08-09 PROCEDURE — G8428 CUR MEDS NOT DOCUMENT: HCPCS | Performed by: OBSTETRICS & GYNECOLOGY

## 2022-08-09 NOTE — PROGRESS NOTES
Unable to do one hour gtt today, drank an iced coffee.  Significant round lig pain and also c/o right sided sciatica

## 2022-08-09 NOTE — PROGRESS NOTES
Patient's last menstrual period was 01/22/2022 (approximate). Please reference prenatal and OB flow chart for further information  PT here today for routine prenatal care  Pt endorses fetal movement and denies loss of fluid, contractions or vaginal bleeding  Is here for prenatal visit at 20 weeks doing well. No complaints positive fetal movement  ROS:  Pt denies headache, vision changes, right upper quadrant pain, dysuria, or nausea/vomiting,     PE:  /84   Pulse 95   Wt 224 lb (101.6 kg)   LMP 01/22/2022 (Approximate)   BMI 34.06 kg/m²   Gen - Alert and oriented x 3  HEENT- NC/AT, CVS - RRR, Lungs - CTAB  Abd - FH 28        ASSESSMENT AND PLAN:   IUP at 28 weeks  Patient to follow-up in 4 weeks.   Patient to return later this week for her glucose tolerance test

## 2022-08-16 ENCOUNTER — ROUTINE PRENATAL (OUTPATIENT)
Dept: OBGYN CLINIC | Age: 22
End: 2022-08-16
Payer: COMMERCIAL

## 2022-08-16 VITALS
BODY MASS INDEX: 34.97 KG/M2 | SYSTOLIC BLOOD PRESSURE: 122 MMHG | DIASTOLIC BLOOD PRESSURE: 76 MMHG | WEIGHT: 230 LBS | HEART RATE: 89 BPM

## 2022-08-16 DIAGNOSIS — Z34.92 PRENATAL CARE, SECOND TRIMESTER: ICD-10-CM

## 2022-08-16 DIAGNOSIS — B37.31 VULVOVAGINITIS DUE TO YEAST: Primary | ICD-10-CM

## 2022-08-16 DIAGNOSIS — B37.31 VULVOVAGINITIS DUE TO YEAST: ICD-10-CM

## 2022-08-16 PROCEDURE — 99213 OFFICE O/P EST LOW 20 MIN: CPT | Performed by: OBSTETRICS & GYNECOLOGY

## 2022-08-16 PROCEDURE — 1036F TOBACCO NON-USER: CPT | Performed by: OBSTETRICS & GYNECOLOGY

## 2022-08-16 PROCEDURE — G8428 CUR MEDS NOT DOCUMENT: HCPCS | Performed by: OBSTETRICS & GYNECOLOGY

## 2022-08-16 PROCEDURE — G8417 CALC BMI ABV UP PARAM F/U: HCPCS | Performed by: OBSTETRICS & GYNECOLOGY

## 2022-08-16 NOTE — PROGRESS NOTES
Patient's last menstrual period was 01/22/2022 (approximate). Please reference prenatal and OB flow chart for further information  PT here today for routine prenatal care  Pt endorses fetal movement and denies loss of fluid, contractions or vaginal bleeding  Complains of vaginal burning and itching and a discharge. Positive fetal movement. ROS:  Pt denies headache, vision changes, right upper quadrant pain, dysuria, or nausea/vomiting,     PE:  /76   Pulse 89   Wt 230 lb (104.3 kg)   LMP 01/22/2022 (Approximate)   BMI 34.97 kg/m²   Gen - Alert and oriented x 3  HEENT- NC/AT, CVS - RRR, Lungs - CTAB  Abd - FH 32    Patient has yeast vulvovaginitis      ASSESSMENT AND PLAN:   Yeast vulvovaginitis  IUP at 29 weeks  Terazol 7 intravaginally and patient to apply medication to the vulvar region as well.   Patient to follow-up here in 2 weeks patient needs to do her GTT ASAP
No

## 2022-08-23 ENCOUNTER — HOSPITAL ENCOUNTER (OUTPATIENT)
Age: 22
Discharge: HOME | End: 2022-08-23
Attending: OBSTETRICS & GYNECOLOGY | Admitting: OBSTETRICS & GYNECOLOGY
Payer: COMMERCIAL

## 2022-08-23 VITALS
TEMPERATURE: 98.3 F | OXYGEN SATURATION: 97 % | SYSTOLIC BLOOD PRESSURE: 142 MMHG | HEART RATE: 100 BPM | RESPIRATION RATE: 18 BRPM | DIASTOLIC BLOOD PRESSURE: 76 MMHG

## 2022-08-23 LAB
AMPHETAMINE SCREEN, URINE: NORMAL
BACTERIA: ABNORMAL /HPF
BARBITURATE SCREEN URINE: NORMAL
BENZODIAZEPINE SCREEN, URINE: NORMAL
BILIRUBIN URINE: NEGATIVE
BLOOD, URINE: NEGATIVE
CANNABINOID SCREEN URINE: NORMAL
CLARITY: ABNORMAL
COCAINE METABOLITE SCREEN URINE: NORMAL
COLOR: YELLOW
EPITHELIAL CELLS, UA: ABNORMAL /HPF
FENTANYL SCREEN, URINE: NORMAL
GLUCOSE URINE: NEGATIVE MG/DL
KETONES, URINE: ABNORMAL MG/DL
LEUKOCYTE ESTERASE, URINE: ABNORMAL
Lab: NORMAL
METHADONE SCREEN, URINE: NORMAL
NITRITE, URINE: NEGATIVE
OPIATE SCREEN URINE: NORMAL
OXYCODONE URINE: NORMAL
PH UA: 7 (ref 5–9)
PHENCYCLIDINE SCREEN URINE: NORMAL
PROPOXYPHENE SCREEN: NORMAL
PROTEIN UA: 30 MG/DL
RBC UA: ABNORMAL /HPF (ref 0–2)
SPECIFIC GRAVITY UA: 1.02 (ref 1–1.03)
URINE REFLEX TO CULTURE: ABNORMAL
UROBILINOGEN, URINE: 1 E.U./DL
WBC UA: ABNORMAL /HPF (ref 0–5)

## 2022-08-23 PROCEDURE — 80307 DRUG TEST PRSMV CHEM ANLYZR: CPT

## 2022-08-23 PROCEDURE — 99283 EMERGENCY DEPT VISIT LOW MDM: CPT

## 2022-08-23 PROCEDURE — 81001 URINALYSIS AUTO W/SCOPE: CPT

## 2022-08-23 PROCEDURE — 99214 OFFICE O/P EST MOD 30 MIN: CPT | Performed by: OBSTETRICS & GYNECOLOGY

## 2022-08-23 RX ORDER — CEPHALEXIN 500 MG/1
500 CAPSULE ORAL 2 TIMES DAILY
Qty: 14 CAPSULE | Refills: 0 | Status: SHIPPED | OUTPATIENT
Start: 2022-08-23 | End: 2022-09-26

## 2022-08-23 NOTE — PROGRESS NOTES
Department of Obstetrics and Gynecology  Labor and Delivery Triage Note        CHIEF COMPLAINT: Decreased fetal movement     HISTORY OF PRESENT ILLNESS:      The patient is a 23 y.o.  30w3d. OB History          7    Para   2    Term   2       0    AB   3    Living   2         SAB   3    IAB   0    Ectopic   0    Molar   0    Multiple   0    Live Births   2              Patient presents with a chief complaint as above. Denies DFM/VB/LOF/CTX    Estimated Due Date:  Estimated Date of Delivery: 10/29/22    PAST MEDICAL HISTORY:   Past Medical History:   Diagnosis Date    Anxiety     BMI (body mass index), pediatric, 85% to less than 95% for age 2014    Chronic hypertension affecting pregnancy     Class 1 obesity due to excess calories without serious comorbidity with body mass index (BMI) of 33.0 to 33.9 in adult 2019    Depression     hx of depression    Generalized abdominal pain 2017    Headache(784.0)     History of depression 2013    Hypertension      labor third trimester with  delivery third trimester 2019       PAST  SURGICAL HISTORY:   Past Surgical History:   Procedure Laterality Date    TOOTH EXTRACTION      WISDOM TOOTH EXTRACTION         SOCIAL HISTORY:     reports that she has quit smoking. She has never used smokeless tobacco. She reports that she does not currently use alcohol. She reports that she does not use drugs. MEDICATIONS:    Prior to Admission medications    Medication Sig Start Date End Date Taking?  Authorizing Provider   terconazole (TERAZOL 7) 0.4 % vaginal cream Place 1 applicator vaginally nightly Place vaginally for 7 nights  Patient not taking: Reported on 2022   DO CAROL Martinez 325 (65 Fe) MG tablet TAKE 1 TABLET BY MOUTH THREE TIMES A DAY WITH FOOD  Patient not taking: Reported on 2022   Historical Provider, MD   docusate sodium (COLACE) 100 MG capsule TAKE 1 CAPSULE BY MOUTH THREE TIMES A DAY to be taken with ferrous sulfate  Patient not taking: Reported on 8/23/2022 5/5/22   Historical Provider, MD   clotrimazole (LOTRIMIN) 1 % vaginal cream Place vaginally  Patient not taking: Reported on 8/23/2022 4/18/22   Historical Provider, MD   cefdinir (OMNICEF) 300 MG capsule TAKE 1 CAPSULE BY MOUTH EVERY TWELVE HOURS  Patient not taking: Reported on 8/23/2022 4/18/22   Historical Provider, MD   acetaminophen (TYLENOL) 500 MG tablet Take 2 tablets by mouth 4 times daily as needed for Pain 4/14/22   Pinky Richardson DO   Prenatal Vit-Fe Fumarate-FA (PREPLUS) 27-1 MG TABS Take 1 tablet by mouth daily  Patient not taking: Reported on 8/23/2022 3/24/22   Ibis Kwong DO        PRENATAL CARE:    Complicated by: none    REVIEW OF SYSTEMS:     Respiratory: negative  Cardiovascular: negative  Gastrointestinal: negative    APPEARANCE:      Pain:  no      PHYSICAL EXAM:    Vital Signs: VS wnl-reviewed/Respirations normal effort    /82   Pulse (!) 109   Temp 98.3 °F (36.8 °C) (Oral)   Resp 18   LMP 01/22/2022 (Approximate)   SpO2 97%     Abdomen: soft, NT, ND, no rebound/guarding  Uterus:  gravid/non-tender  LE Edema: 1+  Fetal heart rate:  Category I  Cervix:  not examined  Contraction frequency:  none  Membranes:  Intact      RESULTS:    NST: Reactive      GENERAL LABS:      Recent Results (from the past 24 hour(s))   Urinalysis with Reflex to Culture    Collection Time: 08/23/22 12:00 PM    Specimen: Urine   Result Value Ref Range    Color, UA Yellow Straw/Yellow    Clarity, UA CLOUDY (A) Clear    Glucose, Ur Negative Negative mg/dL    Bilirubin Urine Negative Negative    Ketones, Urine TRACE (A) Negative mg/dL    Specific Gravity, UA 1.021 1.005 - 1.030    Blood, Urine Negative Negative    pH, UA 7.0 5.0 - 9.0    Protein, UA 30 (A) Negative mg/dL    Urobilinogen, Urine 1.0 <2.0 E.U./dL    Nitrite, Urine Negative Negative    Leukocyte Esterase, Urine LARGE (A) Negative   Urine Drug Screen Collection Time: 08/23/22 12:00 PM   Result Value Ref Range    Amphetamine Screen, Urine Neg Negative <1000 ng/mL    Barbiturate Screen, Ur Neg Negative < 200 ng/mL    Benzodiazepine Screen, Urine Neg Negative < 200 ng/mL    Cannabinoid Scrn, Ur Neg Negative < 50 ng/mL    Cocaine Metabolite Screen, Urine Neg Negative < 300 ng/mL    Opiate Scrn, Ur Neg Negative < 300 ng/mL    PCP Screen, Urine Neg Negative < 25 ng/mL    Methadone Screen, Urine Neg Negative <300 ng/mL    Propoxyphene Scrn, Ur Neg Negative <300 ng/mL    Oxycodone Urine Neg Negative <100 ng/mL    FENTANYL SCREEN, URINE Neg Negative < 50 ng/mL    Drug Screen Comment: see below        TRIAGE COURSE:  Pt admitted to triage for decreased fetal movement. Reactive NST. Fetal movement felt by patient.  Large leukocytes on UA    IMPRESSION:     IUP at 30w3d  UTI in pregnancy   Rx for Keflex 500 mg PO BID x 7 days  Decreased fetal movement  Reactive NST   Fetal movement felt by mom          DISPOSITION:  Discharge to Home  Keep appointment with OB provider     Discharge Information  Current Discharge Medication List        CONTINUE these medications which have NOT CHANGED    Details   terconazole (TERAZOL 7) 0.4 % vaginal cream Place 1 applicator vaginally nightly Place vaginally for 7 nights  Qty: 45 g, Refills: 1      FEROSUL 325 (65 Fe) MG tablet TAKE 1 TABLET BY MOUTH THREE TIMES A DAY WITH FOOD      docusate sodium (COLACE) 100 MG capsule TAKE 1 CAPSULE BY MOUTH THREE TIMES A DAY to be taken with ferrous sulfate      clotrimazole (LOTRIMIN) 1 % vaginal cream Place vaginally      cefdinir (OMNICEF) 300 MG capsule TAKE 1 CAPSULE BY MOUTH EVERY TWELVE HOURS      acetaminophen (TYLENOL) 500 MG tablet Take 2 tablets by mouth 4 times daily as needed for Pain  Qty: 120 tablet, Refills: 0      Prenatal Vit-Fe Fumarate-FA (PREPLUS) 27-1 MG TABS Take 1 tablet by mouth daily  Qty: 30 tablet, Refills: 11    Associated Diagnoses: Encounter for supervision of other normal pregnancy in first trimester             Pt instructed to return if:     - Leaking fluid  - Vaginal bleeding  - Regular contractions:  Every 5 minutes or closer for one hour  - Decreased fetal movement  - Worsening abdominal (belly) pain  - Headache, blurry vision, increased swelling, upper abdominal pain    Reza Goodman MBA, Sandy Dominique, [facility]  August 23, 2022 1:01 PM

## 2022-08-23 NOTE — FLOWSHEET NOTE
Pt given written and verbal discharge instructions. Kick counts information provided. Reviewed when to return with pt and understanding was verbalized.

## 2022-09-06 ENCOUNTER — ROUTINE PRENATAL (OUTPATIENT)
Dept: OBGYN CLINIC | Age: 22
End: 2022-09-06
Payer: COMMERCIAL

## 2022-09-06 VITALS
HEART RATE: 102 BPM | DIASTOLIC BLOOD PRESSURE: 78 MMHG | SYSTOLIC BLOOD PRESSURE: 112 MMHG | BODY MASS INDEX: 35.43 KG/M2 | WEIGHT: 233 LBS

## 2022-09-06 DIAGNOSIS — Z34.83 SUPERVISION OF NORMAL INTRAUTERINE PREGNANCY IN MULTIGRAVIDA IN THIRD TRIMESTER: Primary | ICD-10-CM

## 2022-09-06 PROCEDURE — G8428 CUR MEDS NOT DOCUMENT: HCPCS | Performed by: OBSTETRICS & GYNECOLOGY

## 2022-09-06 PROCEDURE — G8417 CALC BMI ABV UP PARAM F/U: HCPCS | Performed by: OBSTETRICS & GYNECOLOGY

## 2022-09-06 PROCEDURE — 99213 OFFICE O/P EST LOW 20 MIN: CPT | Performed by: OBSTETRICS & GYNECOLOGY

## 2022-09-06 PROCEDURE — 1036F TOBACCO NON-USER: CPT | Performed by: OBSTETRICS & GYNECOLOGY

## 2022-09-06 NOTE — PROGRESS NOTES
Patient's last menstrual period was 01/22/2022 (approximate). Please reference prenatal and OB flow chart for further information  PT here today for routine prenatal care  Pt endorses fetal movement and denies loss of fluid, contractions or vaginal bleeding  Patient complains of left-sided headaches rule out blood days. Positive fetal movement. Kick counts are being done twice daily and have been good  ROS:  Pt denies headache, vision changes, right upper quadrant pain, dysuria, or nausea/vomiting,     PE:  /78   Pulse (!) 102   Wt 233 lb (105.7 kg)   LMP 01/22/2022 (Approximate)   BMI 35.43 kg/m²   Gen - Alert and oriented x 3  HEENT- NC/AT, CVS - RRR, Lungs - CTAB  Abd - FH 33        ASSESSMENT AND PLAN:   IUP at 32 weeks plan patient to follow-up in 2 weeks.   Patient to do her GTT soon as possible

## 2022-09-12 DIAGNOSIS — Z34.92 PRENATAL CARE, SECOND TRIMESTER: ICD-10-CM

## 2022-09-12 LAB
GLUCOSE, 1HR PP: 152 MG/DL (ref 60–140)
HCT VFR BLD CALC: 31.8 % (ref 37–47)
HEMOGLOBIN: 10.3 G/DL (ref 12–16)

## 2022-09-13 LAB — RPR: NORMAL

## 2022-09-19 ENCOUNTER — TELEPHONE (OUTPATIENT)
Dept: OBGYN CLINIC | Age: 22
End: 2022-09-19

## 2022-09-19 ENCOUNTER — HOSPITAL ENCOUNTER (OUTPATIENT)
Age: 22
Discharge: HOME | End: 2022-09-19
Attending: OBSTETRICS & GYNECOLOGY | Admitting: OBSTETRICS & GYNECOLOGY
Payer: COMMERCIAL

## 2022-09-19 VITALS
DIASTOLIC BLOOD PRESSURE: 82 MMHG | TEMPERATURE: 98.2 F | HEART RATE: 100 BPM | SYSTOLIC BLOOD PRESSURE: 138 MMHG | RESPIRATION RATE: 18 BRPM

## 2022-09-19 DIAGNOSIS — Z34.81 ENCOUNTER FOR SUPERVISION OF OTHER NORMAL PREGNANCY IN FIRST TRIMESTER: ICD-10-CM

## 2022-09-19 PROBLEM — R10.2 PELVIC PRESSURE IN PREGNANCY, ANTEPARTUM, THIRD TRIMESTER: Status: ACTIVE | Noted: 2022-09-19

## 2022-09-19 PROBLEM — O26.893 PELVIC PRESSURE IN PREGNANCY, ANTEPARTUM, THIRD TRIMESTER: Status: ACTIVE | Noted: 2022-09-19

## 2022-09-19 PROBLEM — Z3A.34 34 WEEKS GESTATION OF PREGNANCY: Status: ACTIVE | Noted: 2022-09-19

## 2022-09-19 LAB
ALBUMIN SERPL-MCNC: 3.4 G/DL (ref 3.5–4.6)
ALP BLD-CCNC: 122 U/L (ref 40–130)
ALT SERPL-CCNC: 9 U/L (ref 0–33)
AMPHETAMINE SCREEN, URINE: NORMAL
ANION GAP SERPL CALCULATED.3IONS-SCNC: 10 MEQ/L (ref 9–15)
AST SERPL-CCNC: 10 U/L (ref 0–35)
BACTERIA: ABNORMAL /HPF
BARBITURATE SCREEN URINE: NORMAL
BASOPHILS ABSOLUTE: 0 K/UL (ref 0–0.2)
BASOPHILS RELATIVE PERCENT: 0.3 %
BENZODIAZEPINE SCREEN, URINE: NORMAL
BILIRUB SERPL-MCNC: 0.3 MG/DL (ref 0.2–0.7)
BILIRUBIN URINE: NEGATIVE
BLOOD, URINE: NEGATIVE
BUN BLDV-MCNC: 6 MG/DL (ref 6–20)
CALCIUM SERPL-MCNC: 8.4 MG/DL (ref 8.5–9.9)
CANNABINOID SCREEN URINE: NORMAL
CHLORIDE BLD-SCNC: 103 MEQ/L (ref 95–107)
CLARITY: CLEAR
CO2: 22 MEQ/L (ref 20–31)
COCAINE METABOLITE SCREEN URINE: NORMAL
COLOR: YELLOW
CREAT SERPL-MCNC: 0.38 MG/DL (ref 0.5–0.9)
CREATININE URINE: 90 MG/DL
EOSINOPHILS ABSOLUTE: 0.1 K/UL (ref 0–0.7)
EOSINOPHILS RELATIVE PERCENT: 0.8 %
EPITHELIAL CELLS, UA: ABNORMAL /HPF (ref 0–5)
FENTANYL SCREEN, URINE: NORMAL
GFR AFRICAN AMERICAN: >60
GFR NON-AFRICAN AMERICAN: >60
GLOBULIN: 3 G/DL (ref 2.3–3.5)
GLUCOSE BLD-MCNC: 92 MG/DL (ref 70–99)
GLUCOSE URINE: NEGATIVE MG/DL
HCT VFR BLD CALC: 31 % (ref 37–47)
HEMOGLOBIN: 10.3 G/DL (ref 12–16)
HYALINE CASTS: ABNORMAL /HPF (ref 0–5)
KETONES, URINE: NEGATIVE MG/DL
LACTATE DEHYDROGENASE: 150 U/L (ref 135–214)
LEUKOCYTE ESTERASE, URINE: ABNORMAL
LYMPHOCYTES ABSOLUTE: 1.4 K/UL (ref 1–4.8)
LYMPHOCYTES RELATIVE PERCENT: 18.1 %
Lab: NORMAL
MCH RBC QN AUTO: 25.1 PG (ref 27–31.3)
MCHC RBC AUTO-ENTMCNC: 33.2 % (ref 33–37)
MCV RBC AUTO: 75.7 FL (ref 82–100)
METHADONE SCREEN, URINE: NORMAL
MONOCYTES ABSOLUTE: 0.6 K/UL (ref 0.2–0.8)
MONOCYTES RELATIVE PERCENT: 7.5 %
NEUTROPHILS ABSOLUTE: 5.8 K/UL (ref 1.4–6.5)
NEUTROPHILS RELATIVE PERCENT: 73.3 %
NITRITE, URINE: NEGATIVE
OPIATE SCREEN URINE: NORMAL
OXYCODONE URINE: NORMAL
PDW BLD-RTO: 15.1 % (ref 11.5–14.5)
PH UA: 7 (ref 5–9)
PHENCYCLIDINE SCREEN URINE: NORMAL
PLATELET # BLD: 294 K/UL (ref 130–400)
POTASSIUM SERPL-SCNC: 3.7 MEQ/L (ref 3.4–4.9)
PROPOXYPHENE SCREEN: NORMAL
PROTEIN PROTEIN: 16 MG/DL
PROTEIN UA: NEGATIVE MG/DL
PROTEIN/CREAT RATIO: 0.2 ML/ML
PROTEIN/CREAT RATIO: 0.2 ML/ML (ref 0–0.2)
RBC # BLD: 4.1 M/UL (ref 4.2–5.4)
RBC UA: ABNORMAL /HPF (ref 0–5)
SODIUM BLD-SCNC: 135 MEQ/L (ref 135–144)
SPECIFIC GRAVITY UA: 1.01 (ref 1–1.03)
TOTAL PROTEIN: 6.4 G/DL (ref 6.3–8)
URIC ACID, SERUM: 2.8 MG/DL (ref 2.4–5.7)
URINE REFLEX TO CULTURE: ABNORMAL
UROBILINOGEN, URINE: 1 E.U./DL
WBC # BLD: 7.9 K/UL (ref 4.8–10.8)
WBC UA: ABNORMAL /HPF (ref 0–5)

## 2022-09-19 PROCEDURE — 99283 EMERGENCY DEPT VISIT LOW MDM: CPT

## 2022-09-19 PROCEDURE — 81001 URINALYSIS AUTO W/SCOPE: CPT

## 2022-09-19 PROCEDURE — 84156 ASSAY OF PROTEIN URINE: CPT

## 2022-09-19 PROCEDURE — 80053 COMPREHEN METABOLIC PANEL: CPT

## 2022-09-19 PROCEDURE — 85025 COMPLETE CBC W/AUTO DIFF WBC: CPT

## 2022-09-19 PROCEDURE — 80307 DRUG TEST PRSMV CHEM ANLYZR: CPT

## 2022-09-19 PROCEDURE — 83615 LACTATE (LD) (LDH) ENZYME: CPT

## 2022-09-19 PROCEDURE — 84550 ASSAY OF BLOOD/URIC ACID: CPT

## 2022-09-19 PROCEDURE — 99213 OFFICE O/P EST LOW 20 MIN: CPT | Performed by: OBSTETRICS & GYNECOLOGY

## 2022-09-19 NOTE — ED TRIAGE NOTES
Department of Obstetrics and Gynecology  Labor and Delivery  Yuliya Lara MD: [facility] Triage Note      SUBJECTIVE:  Patient is a 15yo [address] [facility]  female @ 34.2 weeks, [facility] 10/29/2022 who presents with 2 day history of DFM, vaginal Pressure, and occasional dizziness. She desired evaluation as she has history of Pre-E. She reports DFM, but denies LOF, VB, VD, or contractions. She does report vaginal pressure. She admits to recent coitus, last night and prior to arrival. She has occasional dizziness but wanted to be assessed now for Pre-Eclampsia. She denies any current symptoms of dizziness    OBJECTIVE    ROS:  Gen: Fatigue  CV: Occasional Dizziness  Lungs: Negative  Abdomen: DFM  Pelvis: Pressure  Rest of systems reviewed and found to be negative.     Vitals:  /82   Pulse 100   Temp 98.2 °F (36.8 °C) (Oral)   Resp 18   LMP 01/22/2022 (Approximate)     PE:   Gen: AxO x 3, In NAD  Abdomen: Soft, Gravid, +FM  Cervix:             Dilation:  0         Effacement:  40         Station:  -1         Consistency:  Medium         Position: Posterior    Fetal Position:  Vertex    Membranes: Intact    Fetal heart rate:  Category I       Baseline Heart Rate: 140s R       Accelerations:  present       Decelerations:  none       Variability:  moderate    Contraction frequency: Occasional        DATA:  Results     Component Value Units   Protein Total Urine Random [[id number]]    Collected: 09/19/22 1210    Updated: 09/19/22 1359     Creatinine, Ur 90.0 mg/dL   Urinalysis with Reflex to Culture [[id number]] (Abnormal)    Collected: 09/19/22 1210    Updated: 09/19/22 1359    Specimen Source: Urine, clean catch     Color, UA Yellow    Clarity, UA Clear    Glucose, Ur Negative mg/dL    Bilirubin Urine Negative    Ketones, Urine Negative mg/dL    Specific Gravity, UA 1.015    Blood, Urine Negative    pH, UA 7.0    Protein, UA Negative mg/dL    Urobilinogen, Urine 1.0 E.U./dL    Nitrite, Urine Negative    Leukocyte Esterase, Urine TRACE Abnormal     Urine Reflex to Culture Not Indicated   Microscopic Urinalysis [[id number]] (Abnormal)    Collected: 09/19/22 1210    Updated: 09/19/22 1355     Bacteria, UA FEW Abnormal  /HPF    Hyaline Casts, UA 5-10 /HPF    WBC, UA 6-9 Abnormal  /HPF    RBC, UA 0-2 /HPF    Epithelial Cells, UA 20-50 /HPF   Uric Acid [[id number]]    Collected: 09/19/22 1222    Updated: 09/19/22 1333    Specimen Source: Blood     Uric Acid, Serum 2.8 mg/dL   Lactate Dehydrogenase [[id number]]    Collected: 09/19/22 1222    Updated: 09/19/22 1333    Specimen Source: Blood      U/L   Comprehensive Metabolic Panel [[id number]] (Abnormal)    Collected: 09/19/22 1222    Updated: 09/19/22 1333    Specimen Source: Blood     Sodium 135 mEq/L    Potassium 3.7 mEq/L    Chloride 103 mEq/L    CO2 22 mEq/L    Anion Gap 10 mEq/L    Glucose 92 mg/dL    BUN 6 mg/dL    Creatinine 0.38 Low  mg/dL    GFR Non- >60.0    Comment: >60 mL/min/1.73m2 EGFR, calc. for ages 25 and older using the   MDRD formula (not corrected for weight), is valid for stable   renal function. GFR  >60.0    Comment: >60 mL/min/1.73m2 EGFR, calc. for ages 25 and older using the   MDRD formula (not corrected for weight), is valid for stable   renal function.         Calcium 8.4 Low  mg/dL    Total Protein 6.4 g/dL    Albumin 3.4 Low  g/dL    Total Bilirubin 0.3 mg/dL    Alkaline Phosphatase 122 U/L    ALT 9 U/L    AST 10 U/L    Globulin 3.0 g/dL   CBC with Auto Differential [[id number]] (Abnormal)    Collected: 09/19/22 1222    Updated: 09/19/22 1237    Specimen Source: Blood     WBC 7.9 K/uL    RBC 4.10 Low  M/uL    Hemoglobin 10.3 Low  g/dL    Hematocrit 31.0 Low  %    MCV 75.7 Low  fL    MCH 25.1 Low  pg    MCHC 33.2 %    RDW 15.1 High  %    Platelets 246 K/uL    Neutrophils % 73.3 %    Lymphocytes % 18.1 %    Monocytes % 7.5 %    Eosinophils % 0.8 %    Basophils % 0.3 %    Neutrophils Absolute 5.8 K/uL    Lymphocytes Absolute 1.4 K/uL    Monocytes Absolute 0.6 K/uL    Eosinophils Absolute 0.1 K/uL    Basophils Absolute 0.0 K/uL   Urine Drug Screen [[id number]]    Collected: 09/19/22 1210    Updated: 09/19/22 1236    Specimen Source: Urine, clean catch     Amphetamine Screen, Urine Neg    Barbiturate Screen, Ur Neg    Benzodiazepine Screen, Urine Neg    Cannabinoid Scrn, Ur Neg    Cocaine Metabolite Screen, Urine Neg    Opiate Scrn, Ur Neg    PCP Screen, Urine Neg    Methadone Screen, Urine Neg    Propoxyphene Scrn, Ur Neg    Oxycodone Urine Neg    FENTANYL SCREEN, URINE Neg    Drug Screen Comment: see below    Comment: This method is a screening test to detect only these drug   classes as part of a medical workup. Confirmatory testing   by another method should be ordered if clinically indicated. ASSESSMENT & PLAN:      Assessment:   IUP @ 34.2 weeks with DFM  Pelvic Pressure  History of Pre-Eclampsia    Plan:    NST reactive. Reassurance given. Patient reports feeling fetal movement during monitoring. Cervix is closed and long. [address] work up and BP were within desired range. Reassurance given. Discharge home with instructions, fetal kick counts, Pre-Eclampsia precautions.  Patient to follow up with Dr. Yuriy Ramírez 9/20/2022 @ 11am.

## 2022-09-19 NOTE — TELEPHONE ENCOUNTER
Pt is 34 weeks. Decreased fetal movement for 2 days. Vaginal pressure, right side pain under breast, fatigued, headache, nausea. Pt advised to go to L&D and L&D was notified.

## 2022-09-19 NOTE — FLOWSHEET NOTE
Pt arrived with complaints of pressure since Saturday 9/17, occasional dizziness, decreased fetal movement, denies complicaitons during this pregnancy, states previous prenancy with preeclampsia and delivered 3 weeks early, both deliveries vaginal.

## 2022-09-20 ENCOUNTER — ROUTINE PRENATAL (OUTPATIENT)
Dept: OBGYN CLINIC | Age: 22
End: 2022-09-20
Payer: COMMERCIAL

## 2022-09-20 VITALS
BODY MASS INDEX: 35.88 KG/M2 | HEART RATE: 98 BPM | WEIGHT: 236 LBS | SYSTOLIC BLOOD PRESSURE: 114 MMHG | DIASTOLIC BLOOD PRESSURE: 79 MMHG

## 2022-09-20 DIAGNOSIS — Z34.83 SUPERVISION OF NORMAL INTRAUTERINE PREGNANCY IN MULTIGRAVIDA IN THIRD TRIMESTER: Primary | ICD-10-CM

## 2022-09-20 PROCEDURE — 99213 OFFICE O/P EST LOW 20 MIN: CPT | Performed by: OBSTETRICS & GYNECOLOGY

## 2022-09-20 PROCEDURE — G8417 CALC BMI ABV UP PARAM F/U: HCPCS | Performed by: OBSTETRICS & GYNECOLOGY

## 2022-09-20 PROCEDURE — G8428 CUR MEDS NOT DOCUMENT: HCPCS | Performed by: OBSTETRICS & GYNECOLOGY

## 2022-09-20 PROCEDURE — 1036F TOBACCO NON-USER: CPT | Performed by: OBSTETRICS & GYNECOLOGY

## 2022-09-20 NOTE — PROGRESS NOTES
Patient's last menstrual period was 01/22/2022 (approximate). Please reference prenatal and OB flow chart for further information  PT here today for routine prenatal care  Pt endorses fetal movement and denies loss of fluid, contractions or vaginal bleeding  Is here today at 34 weeks. Positive fetal movement. She went to labor and delivery yesterday with decreased fetal movement evaluation was normal.  Her blood pressure was initially elevated there but her PIH work-up was negative. Blood pressure today is normal.  Is doing kick counts and they have been normal  ROS:  Pt denies headache, vision changes, right upper quadrant pain, dysuria, or nausea/vomiting,     PE:  /79   Pulse 98   Wt 236 lb (107 kg)   LMP 01/22/2022 (Approximate)   BMI 35.88 kg/m²   Gen - Alert and oriented x 3  HEENT- NC/AT, CVS - RRR, Lungs - CTAB  Abd - FH 34        ASSESSMENT AND PLAN:   IUP at 34 weeks and 3 days  Patient to follow-up in 1 week.

## 2022-09-20 NOTE — PROGRESS NOTES
Mild swelling. Pressure in vaginal area. C/o lightheaded at times.  Round ligament pain on left side

## 2022-09-26 PROBLEM — K76.0 FATTY (CHANGE OF) LIVER, NOT ELSEWHERE CLASSIFIED: Status: ACTIVE | Noted: 2021-09-23

## 2022-09-26 PROBLEM — R11.14 BILIOUS VOMITING: Status: ACTIVE | Noted: 2021-09-23

## 2022-09-26 PROBLEM — K82.4 CHOLESTEROLOSIS OF GALLBLADDER: Status: ACTIVE | Noted: 2021-09-23

## 2022-09-27 ENCOUNTER — ROUTINE PRENATAL (OUTPATIENT)
Dept: OBGYN CLINIC | Age: 22
End: 2022-09-27
Payer: COMMERCIAL

## 2022-09-27 VITALS
BODY MASS INDEX: 36.19 KG/M2 | HEART RATE: 106 BPM | DIASTOLIC BLOOD PRESSURE: 74 MMHG | WEIGHT: 238 LBS | SYSTOLIC BLOOD PRESSURE: 132 MMHG

## 2022-09-27 DIAGNOSIS — R73.09 ELEVATED GLUCOSE TOLERANCE TEST: Primary | ICD-10-CM

## 2022-09-27 DIAGNOSIS — Z34.83 SUPERVISION OF NORMAL INTRAUTERINE PREGNANCY IN MULTIGRAVIDA IN THIRD TRIMESTER: ICD-10-CM

## 2022-09-27 PROCEDURE — G8428 CUR MEDS NOT DOCUMENT: HCPCS | Performed by: OBSTETRICS & GYNECOLOGY

## 2022-09-27 PROCEDURE — 99213 OFFICE O/P EST LOW 20 MIN: CPT | Performed by: OBSTETRICS & GYNECOLOGY

## 2022-09-27 PROCEDURE — 1036F TOBACCO NON-USER: CPT | Performed by: OBSTETRICS & GYNECOLOGY

## 2022-09-27 PROCEDURE — G8417 CALC BMI ABV UP PARAM F/U: HCPCS | Performed by: OBSTETRICS & GYNECOLOGY

## 2022-09-27 NOTE — PROGRESS NOTES
Patient's last menstrual period was 01/22/2022 (approximate). Please reference prenatal and OB flow chart for further information  PT here today for routine prenatal care  Pt endorses fetal movement and denies loss of fluid, contractions or vaginal bleeding  Is uncomfortable otherwise doing well. She is feeling good movement now. Kick counts twice daily are good  ROS:  Pt denies headache, vision changes, right upper quadrant pain, dysuria, or nausea/vomiting,     PE:  /74   Pulse (!) 106   Wt 238 lb (108 kg)   LMP 01/22/2022 (Approximate)   BMI 36.19 kg/m²   Gen - Alert and oriented x 3  HEENT- NC/AT, CVS - RRR, Lungs - CTAB  Abd - FH 36        ASSESSMENT AND PLAN:   EP at 35 weeks  Plan patient to follow-up in 1 week.   Patient to continue to do kick counts twice daily

## 2022-10-06 ENCOUNTER — ROUTINE PRENATAL (OUTPATIENT)
Dept: OBGYN CLINIC | Age: 22
End: 2022-10-06
Payer: COMMERCIAL

## 2022-10-06 VITALS
DIASTOLIC BLOOD PRESSURE: 84 MMHG | BODY MASS INDEX: 36.95 KG/M2 | SYSTOLIC BLOOD PRESSURE: 124 MMHG | HEART RATE: 91 BPM | WEIGHT: 243 LBS

## 2022-10-06 DIAGNOSIS — Z34.83 SUPERVISION OF NORMAL INTRAUTERINE PREGNANCY IN MULTIGRAVIDA IN THIRD TRIMESTER: ICD-10-CM

## 2022-10-06 DIAGNOSIS — R73.09 ELEVATED GLUCOSE TOLERANCE TEST: Primary | ICD-10-CM

## 2022-10-06 PROCEDURE — G8484 FLU IMMUNIZE NO ADMIN: HCPCS | Performed by: OBSTETRICS & GYNECOLOGY

## 2022-10-06 PROCEDURE — 99213 OFFICE O/P EST LOW 20 MIN: CPT | Performed by: OBSTETRICS & GYNECOLOGY

## 2022-10-06 PROCEDURE — G8417 CALC BMI ABV UP PARAM F/U: HCPCS | Performed by: OBSTETRICS & GYNECOLOGY

## 2022-10-06 PROCEDURE — G8428 CUR MEDS NOT DOCUMENT: HCPCS | Performed by: OBSTETRICS & GYNECOLOGY

## 2022-10-06 PROCEDURE — 1036F TOBACCO NON-USER: CPT | Performed by: OBSTETRICS & GYNECOLOGY

## 2022-10-06 NOTE — PROGRESS NOTES
Patient's last menstrual period was 01/22/2022 (approximate). Please reference prenatal and OB flow chart for further information  PT here today for routine prenatal care  Pt endorses fetal movement and denies loss of fluid, contractions or vaginal bleeding  Patient is uncomfortable. Complaining of some inguinal ligament pain.   She still has not done her 3-hour GTT  ROS:  Pt denies headache, vision changes, right upper quadrant pain, dysuria, or nausea/vomiting,     PE:  /84   Pulse 91   Wt 243 lb (110.2 kg)   LMP 01/22/2022 (Approximate)   BMI 36.95 kg/m²   Gen - Alert and oriented x 3  HEENT- NC/AT, CVS - RRR, Lungs - CTAB  Abd - FH 38    Group B strep culture done      ASSESSMENT AND PLAN:   IUP at 36 weeks and 5 days  Patient to follow-up in 1 week

## 2022-10-10 LAB — GROUP B STREP CULTURE: NORMAL

## 2022-10-12 DIAGNOSIS — R73.09 ELEVATED GLUCOSE TOLERANCE TEST: ICD-10-CM

## 2022-10-12 LAB
GLUCOSE FASTING: 75 MG/DL (ref 0–95)
GLUCOSE TOLERANCE TEST 1 HOUR: 144 MG/DL (ref 0–180)
GLUCOSE TOLERANCE TEST 2 HOUR: 162 MG/DL (ref 0–155)
GLUCOSE TOLERANCE TEST 3 HOUR: 59 MG/DL (ref 0–140)

## 2022-10-13 ENCOUNTER — ROUTINE PRENATAL (OUTPATIENT)
Dept: OBGYN CLINIC | Age: 22
End: 2022-10-13
Payer: COMMERCIAL

## 2022-10-13 VITALS — WEIGHT: 243 LBS | SYSTOLIC BLOOD PRESSURE: 138 MMHG | BODY MASS INDEX: 36.95 KG/M2 | DIASTOLIC BLOOD PRESSURE: 88 MMHG

## 2022-10-13 DIAGNOSIS — Z34.83 SUPERVISION OF NORMAL INTRAUTERINE PREGNANCY IN MULTIGRAVIDA IN THIRD TRIMESTER: Primary | ICD-10-CM

## 2022-10-13 PROCEDURE — 1036F TOBACCO NON-USER: CPT | Performed by: OBSTETRICS & GYNECOLOGY

## 2022-10-13 PROCEDURE — G8417 CALC BMI ABV UP PARAM F/U: HCPCS | Performed by: OBSTETRICS & GYNECOLOGY

## 2022-10-13 PROCEDURE — G8484 FLU IMMUNIZE NO ADMIN: HCPCS | Performed by: OBSTETRICS & GYNECOLOGY

## 2022-10-13 PROCEDURE — G8428 CUR MEDS NOT DOCUMENT: HCPCS | Performed by: OBSTETRICS & GYNECOLOGY

## 2022-10-13 PROCEDURE — 99213 OFFICE O/P EST LOW 20 MIN: CPT | Performed by: OBSTETRICS & GYNECOLOGY

## 2022-10-13 NOTE — PROGRESS NOTES
Patient's last menstrual period was 01/22/2022 (approximate). Please reference prenatal and OB flow chart for further information  PT here today for routine prenatal care  Pt endorses fetal movement and denies loss of fluid, contractions or vaginal bleeding  Patient is having occasional contractions. Positive fetal movement kick counts twice daily are good  ROS:  Pt denies headache, vision changes, right upper quadrant pain, dysuria, or nausea/vomiting,     PE:  /88   Wt 243 lb (110.2 kg)   LMP 01/22/2022 (Approximate)   BMI 36.95 kg/m²   Gen - Alert and oriented x 3  HEENT- NC/AT, CVS - RRR, Lungs - CTAB  Abd - FH 40 cm    Her cervix is 1 and 50% effaced there is a significant amount of pressure palpable on the cervix. Vertex is at a -1 station      ASSESSMENT AND PLAN:   IUP at 37 weeks and 5 days.  patient to follow-up here in 1 week  Labor warnings given

## 2022-10-20 ENCOUNTER — ROUTINE PRENATAL (OUTPATIENT)
Dept: OBGYN CLINIC | Age: 22
End: 2022-10-20

## 2022-10-20 VITALS
DIASTOLIC BLOOD PRESSURE: 88 MMHG | WEIGHT: 241 LBS | HEART RATE: 99 BPM | BODY MASS INDEX: 36.64 KG/M2 | SYSTOLIC BLOOD PRESSURE: 126 MMHG

## 2022-10-20 DIAGNOSIS — Z34.83 SUPERVISION OF NORMAL INTRAUTERINE PREGNANCY IN MULTIGRAVIDA IN THIRD TRIMESTER: Primary | ICD-10-CM

## 2022-10-20 NOTE — PROGRESS NOTES
Patient's last menstrual period was 01/22/2022 (approximate). Please reference prenatal and OB flow chart for further information  PT here today for routine prenatal care  Pt endorses fetal movement and denies loss of fluid, contractions or vaginal bleeding  Prenatal visit at 45 and 5. She is uncomfortable. Positive fetal movement. No contractions. ROS:  Pt denies headache, vision changes, right upper quadrant pain, dysuria, or nausea/vomiting,     PE:  /88   Pulse 99   Wt 241 lb (109.3 kg)   LMP 01/22/2022 (Approximate)   BMI 36.64 kg/m²   Gen - Alert and oriented x 3  HEENT- NC/AT, CVS - RRR, Lungs - CTAB  Abd - FH 42    Cervix is 3 cm dilated 50% effaced vertex is at a 0 station      ASSESSMENT AND PLAN:   IUP at 38 weeks and 5 days  Plan patient to be induced on Monday the 24th at 07 100.   Set up with [employer]uss in labor lower

## 2022-10-24 ENCOUNTER — APPOINTMENT (OUTPATIENT)
Dept: LABOR AND DELIVERY | Age: 22
DRG: 540 | End: 2022-10-24
Payer: COMMERCIAL

## 2022-10-24 ENCOUNTER — ANESTHESIA (OUTPATIENT)
Dept: LABOR AND DELIVERY | Age: 22
DRG: 540 | End: 2022-10-24
Payer: COMMERCIAL

## 2022-10-24 ENCOUNTER — APPOINTMENT (OUTPATIENT)
Dept: GENERAL RADIOLOGY | Age: 22
DRG: 540 | End: 2022-10-24
Payer: COMMERCIAL

## 2022-10-24 ENCOUNTER — ANESTHESIA EVENT (OUTPATIENT)
Dept: LABOR AND DELIVERY | Age: 22
DRG: 540 | End: 2022-10-24
Payer: COMMERCIAL

## 2022-10-24 ENCOUNTER — HOSPITAL ENCOUNTER (INPATIENT)
Age: 22
LOS: 3 days | Discharge: HOME | DRG: 540 | End: 2022-10-27
Attending: OBSTETRICS & GYNECOLOGY | Admitting: OBSTETRICS & GYNECOLOGY
Payer: COMMERCIAL

## 2022-10-24 DIAGNOSIS — G89.18 POSTOPERATIVE PAIN: Primary | ICD-10-CM

## 2022-10-24 PROBLEM — Z3A.39 39 WEEKS GESTATION OF PREGNANCY: Status: ACTIVE | Noted: 2022-10-24

## 2022-10-24 PROBLEM — Z34.90 ENCOUNTER FOR ELECTIVE INDUCTION OF LABOR: Status: ACTIVE | Noted: 2022-10-24

## 2022-10-24 LAB
ABO/RH: NORMAL
ALBUMIN SERPL-MCNC: 3.5 G/DL (ref 3.5–4.6)
ALP BLD-CCNC: 181 U/L (ref 40–130)
ALT SERPL-CCNC: 11 U/L (ref 0–33)
AMPHETAMINE SCREEN, URINE: NORMAL
ANION GAP SERPL CALCULATED.3IONS-SCNC: 11 MEQ/L (ref 9–15)
ANISOCYTOSIS: ABNORMAL
ANTIBODY SCREEN: NORMAL
AST SERPL-CCNC: 14 U/L (ref 0–35)
BACTERIA: ABNORMAL /HPF
BARBITURATE SCREEN URINE: NORMAL
BASOPHILS ABSOLUTE: 0 K/UL (ref 0–0.2)
BASOPHILS RELATIVE PERCENT: 0.3 %
BENZODIAZEPINE SCREEN, URINE: NORMAL
BILIRUB SERPL-MCNC: 0.3 MG/DL (ref 0.2–0.7)
BILIRUBIN URINE: NEGATIVE
BLOOD, URINE: NEGATIVE
BUN BLDV-MCNC: 13 MG/DL (ref 6–20)
CALCIUM SERPL-MCNC: 8.5 MG/DL (ref 8.5–9.9)
CANNABINOID SCREEN URINE: NORMAL
CHLORIDE BLD-SCNC: 102 MEQ/L (ref 95–107)
CLARITY: CLEAR
CO2: 23 MEQ/L (ref 20–31)
COCAINE METABOLITE SCREEN URINE: NORMAL
COLOR: YELLOW
CREAT SERPL-MCNC: 0.46 MG/DL (ref 0.5–0.9)
EOSINOPHILS ABSOLUTE: 0.2 K/UL (ref 0–0.7)
EOSINOPHILS RELATIVE PERCENT: 3 %
EPITHELIAL CELLS, UA: ABNORMAL /HPF (ref 0–5)
FENTANYL SCREEN, URINE: NORMAL
GFR SERPL CREATININE-BSD FRML MDRD: >60 ML/MIN/{1.73_M2}
GLOBULIN: 3.5 G/DL (ref 2.3–3.5)
GLUCOSE BLD-MCNC: 76 MG/DL (ref 70–99)
GLUCOSE URINE: NEGATIVE MG/DL
HCT VFR BLD CALC: 32.2 % (ref 37–47)
HEMOGLOBIN: 10.2 G/DL (ref 12–16)
HEPATITIS B SURFACE ANTIGEN INTERPRETATION: NORMAL
HYALINE CASTS: ABNORMAL /HPF (ref 0–5)
HYPOCHROMIA: ABNORMAL
KETONES, URINE: NEGATIVE MG/DL
LEUKOCYTE ESTERASE, URINE: ABNORMAL
LYMPHOCYTES ABSOLUTE: 1.2 K/UL (ref 1–4.8)
LYMPHOCYTES RELATIVE PERCENT: 17 %
Lab: NORMAL
MCH RBC QN AUTO: 23.2 PG (ref 27–31.3)
MCHC RBC AUTO-ENTMCNC: 31.8 % (ref 33–37)
MCV RBC AUTO: 72.9 FL (ref 79.4–94.8)
METAMYELOCYTES RELATIVE PERCENT: 1 %
METHADONE SCREEN, URINE: NORMAL
MICROCYTES: ABNORMAL
MONOCYTES ABSOLUTE: 0.3 K/UL (ref 0.2–0.8)
MONOCYTES RELATIVE PERCENT: 3.8 %
NEUTROPHILS ABSOLUTE: 5.5 K/UL (ref 1.4–6.5)
NEUTROPHILS RELATIVE PERCENT: 76 %
NITRITE, URINE: NEGATIVE
OPIATE SCREEN URINE: NORMAL
OVALOCYTES: ABNORMAL
OXYCODONE URINE: NORMAL
PDW BLD-RTO: 15.5 % (ref 11.5–14.5)
PH UA: 6.5 (ref 5–9)
PHENCYCLIDINE SCREEN URINE: NORMAL
PLATELET # BLD: 332 K/UL (ref 130–400)
PLATELET SLIDE REVIEW: NORMAL
POIKILOCYTES: ABNORMAL
POTASSIUM SERPL-SCNC: 3.8 MEQ/L (ref 3.4–4.9)
PROPOXYPHENE SCREEN: NORMAL
PROTEIN UA: NEGATIVE MG/DL
RBC # BLD: 4.41 M/UL (ref 4.2–5.4)
RBC UA: ABNORMAL /HPF (ref 0–5)
RPR: NORMAL
SARS-COV-2, NAAT: NOT DETECTED
SODIUM BLD-SCNC: 136 MEQ/L (ref 135–144)
SPECIFIC GRAVITY UA: 1.01 (ref 1–1.03)
TOTAL PROTEIN: 7 G/DL (ref 6.3–8)
URINE REFLEX TO CULTURE: ABNORMAL
UROBILINOGEN, URINE: 0.2 E.U./DL
WBC # BLD: 7.2 K/UL (ref 4.8–10.8)
WBC UA: ABNORMAL /HPF (ref 0–5)

## 2022-10-24 PROCEDURE — 2500000003 HC RX 250 WO HCPCS: Performed by: NURSE ANESTHETIST, CERTIFIED REGISTERED

## 2022-10-24 PROCEDURE — 59514 CESAREAN DELIVERY ONLY: CPT | Performed by: OBSTETRICS & GYNECOLOGY

## 2022-10-24 PROCEDURE — 80053 COMPREHEN METABOLIC PANEL: CPT

## 2022-10-24 PROCEDURE — 86850 RBC ANTIBODY SCREEN: CPT

## 2022-10-24 PROCEDURE — 6360000002 HC RX W HCPCS: Performed by: OBSTETRICS & GYNECOLOGY

## 2022-10-24 PROCEDURE — 1220000000 HC SEMI PRIVATE OB R&B

## 2022-10-24 PROCEDURE — 7100000000 HC PACU RECOVERY - FIRST 15 MIN: Performed by: OBSTETRICS & GYNECOLOGY

## 2022-10-24 PROCEDURE — 2500000003 HC RX 250 WO HCPCS: Performed by: OBSTETRICS & GYNECOLOGY

## 2022-10-24 PROCEDURE — 86900 BLOOD TYPING SEROLOGIC ABO: CPT

## 2022-10-24 PROCEDURE — 2580000003 HC RX 258: Performed by: OBSTETRICS & GYNECOLOGY

## 2022-10-24 PROCEDURE — 2709999900 HC NON-CHARGEABLE SUPPLY: Performed by: OBSTETRICS & GYNECOLOGY

## 2022-10-24 PROCEDURE — 3609079900 HC CESAREAN SECTION: Performed by: OBSTETRICS & GYNECOLOGY

## 2022-10-24 PROCEDURE — 86592 SYPHILIS TEST NON-TREP QUAL: CPT

## 2022-10-24 PROCEDURE — 99024 POSTOP FOLLOW-UP VISIT: CPT | Performed by: OBSTETRICS & GYNECOLOGY

## 2022-10-24 PROCEDURE — 59025 FETAL NON-STRESS TEST: CPT | Performed by: OBSTETRICS & GYNECOLOGY

## 2022-10-24 PROCEDURE — 86901 BLOOD TYPING SEROLOGIC RH(D): CPT

## 2022-10-24 PROCEDURE — 87340 HEPATITIS B SURFACE AG IA: CPT

## 2022-10-24 PROCEDURE — 81001 URINALYSIS AUTO W/SCOPE: CPT

## 2022-10-24 PROCEDURE — 7100000001 HC PACU RECOVERY - ADDTL 15 MIN: Performed by: OBSTETRICS & GYNECOLOGY

## 2022-10-24 PROCEDURE — 74018 RADEX ABDOMEN 1 VIEW: CPT

## 2022-10-24 PROCEDURE — 2580000003 HC RX 258: Performed by: NURSE ANESTHETIST, CERTIFIED REGISTERED

## 2022-10-24 PROCEDURE — 3700000025 EPIDURAL BLOCK: Performed by: NURSE ANESTHETIST, CERTIFIED REGISTERED

## 2022-10-24 PROCEDURE — 85025 COMPLETE CBC W/AUTO DIFF WBC: CPT

## 2022-10-24 PROCEDURE — 87635 SARS-COV-2 COVID-19 AMP PRB: CPT

## 2022-10-24 PROCEDURE — 80307 DRUG TEST PRSMV CHEM ANLYZR: CPT

## 2022-10-24 PROCEDURE — 6360000002 HC RX W HCPCS: Performed by: NURSE ANESTHETIST, CERTIFIED REGISTERED

## 2022-10-24 RX ORDER — SODIUM CHLORIDE 0.9 % (FLUSH) 0.9 %
5-40 SYRINGE (ML) INJECTION EVERY 12 HOURS SCHEDULED
Status: DISCONTINUED | OUTPATIENT
Start: 2022-10-24 | End: 2022-10-27 | Stop reason: HOSPADM

## 2022-10-24 RX ORDER — LIDOCAINE HYDROCHLORIDE AND EPINEPHRINE 20; 5 MG/ML; UG/ML
INJECTION, SOLUTION EPIDURAL; INFILTRATION; INTRACAUDAL; PERINEURAL PRN
Status: DISCONTINUED | OUTPATIENT
Start: 2022-10-24 | End: 2022-10-24 | Stop reason: SDUPTHER

## 2022-10-24 RX ORDER — DEXAMETHASONE SODIUM PHOSPHATE 10 MG/ML
INJECTION, SOLUTION INTRAMUSCULAR; INTRAVENOUS PRN
Status: DISCONTINUED | OUTPATIENT
Start: 2022-10-24 | End: 2022-10-24 | Stop reason: SDUPTHER

## 2022-10-24 RX ORDER — SODIUM CHLORIDE 0.9 % (FLUSH) 0.9 %
5-40 SYRINGE (ML) INJECTION PRN
Status: DISCONTINUED | OUTPATIENT
Start: 2022-10-24 | End: 2022-10-27 | Stop reason: HOSPADM

## 2022-10-24 RX ORDER — ACETAMINOPHEN 325 MG/1
650 TABLET ORAL EVERY 4 HOURS PRN
Status: DISCONTINUED | OUTPATIENT
Start: 2022-10-24 | End: 2022-10-27 | Stop reason: HOSPADM

## 2022-10-24 RX ORDER — SODIUM CHLORIDE, SODIUM LACTATE, POTASSIUM CHLORIDE, CALCIUM CHLORIDE 600; 310; 30; 20 MG/100ML; MG/100ML; MG/100ML; MG/100ML
INJECTION, SOLUTION INTRAVENOUS CONTINUOUS
Status: CANCELLED | OUTPATIENT
Start: 2022-10-24

## 2022-10-24 RX ORDER — ONDANSETRON 2 MG/ML
INJECTION INTRAMUSCULAR; INTRAVENOUS PRN
Status: DISCONTINUED | OUTPATIENT
Start: 2022-10-24 | End: 2022-10-24 | Stop reason: SDUPTHER

## 2022-10-24 RX ORDER — OXYCODONE HYDROCHLORIDE 5 MG/1
5 TABLET ORAL EVERY 4 HOURS PRN
Status: CANCELLED | OUTPATIENT
Start: 2022-10-24

## 2022-10-24 RX ORDER — DIPHENHYDRAMINE HCL 25 MG
25 TABLET ORAL EVERY 4 HOURS PRN
Status: DISCONTINUED | OUTPATIENT
Start: 2022-10-24 | End: 2022-10-27 | Stop reason: HOSPADM

## 2022-10-24 RX ORDER — SODIUM CHLORIDE, SODIUM LACTATE, POTASSIUM CHLORIDE, CALCIUM CHLORIDE 600; 310; 30; 20 MG/100ML; MG/100ML; MG/100ML; MG/100ML
INJECTION, SOLUTION INTRAVENOUS CONTINUOUS
Status: DISCONTINUED | OUTPATIENT
Start: 2022-10-24 | End: 2022-10-27 | Stop reason: HOSPADM

## 2022-10-24 RX ORDER — FENTANYL CITRATE 50 UG/ML
INJECTION, SOLUTION INTRAMUSCULAR; INTRAVENOUS PRN
Status: DISCONTINUED | OUTPATIENT
Start: 2022-10-24 | End: 2022-10-24 | Stop reason: SDUPTHER

## 2022-10-24 RX ORDER — KETOROLAC TROMETHAMINE 30 MG/ML
INJECTION, SOLUTION INTRAMUSCULAR; INTRAVENOUS PRN
Status: DISCONTINUED | OUTPATIENT
Start: 2022-10-24 | End: 2022-10-24 | Stop reason: SDUPTHER

## 2022-10-24 RX ORDER — SODIUM CHLORIDE, SODIUM LACTATE, POTASSIUM CHLORIDE, AND CALCIUM CHLORIDE .6; .31; .03; .02 G/100ML; G/100ML; G/100ML; G/100ML
500 INJECTION, SOLUTION INTRAVENOUS PRN
Status: DISCONTINUED | OUTPATIENT
Start: 2022-10-24 | End: 2022-10-27 | Stop reason: HOSPADM

## 2022-10-24 RX ORDER — SODIUM CHLORIDE 9 MG/ML
INJECTION, SOLUTION INTRAVENOUS PRN
Status: CANCELLED | OUTPATIENT
Start: 2022-10-24

## 2022-10-24 RX ORDER — SODIUM CHLORIDE, SODIUM LACTATE, POTASSIUM CHLORIDE, AND CALCIUM CHLORIDE .6; .31; .03; .02 G/100ML; G/100ML; G/100ML; G/100ML
1000 INJECTION, SOLUTION INTRAVENOUS PRN
Status: DISCONTINUED | OUTPATIENT
Start: 2022-10-24 | End: 2022-10-27 | Stop reason: HOSPADM

## 2022-10-24 RX ORDER — NALBUPHINE HYDROCHLORIDE 20 MG/ML
5 INJECTION, SOLUTION INTRAMUSCULAR; INTRAVENOUS; SUBCUTANEOUS
Status: ACTIVE | OUTPATIENT
Start: 2022-10-24 | End: 2022-10-25

## 2022-10-24 RX ORDER — BUPIVACAINE HYDROCHLORIDE 5 MG/ML
INJECTION, SOLUTION EPIDURAL; INTRACAUDAL PRN
Status: DISCONTINUED | OUTPATIENT
Start: 2022-10-24 | End: 2022-10-24

## 2022-10-24 RX ORDER — CEFAZOLIN SODIUM 1 G/3ML
INJECTION, POWDER, FOR SOLUTION INTRAMUSCULAR; INTRAVENOUS PRN
Status: DISCONTINUED | OUTPATIENT
Start: 2022-10-24 | End: 2022-10-24 | Stop reason: SDUPTHER

## 2022-10-24 RX ORDER — MISOPROSTOL 200 UG/1
800 TABLET ORAL PRN
Status: DISCONTINUED | OUTPATIENT
Start: 2022-10-24 | End: 2022-10-27 | Stop reason: HOSPADM

## 2022-10-24 RX ORDER — SODIUM CHLORIDE 0.9 % (FLUSH) 0.9 %
5-40 SYRINGE (ML) INJECTION PRN
Status: CANCELLED | OUTPATIENT
Start: 2022-10-24

## 2022-10-24 RX ORDER — PROPOFOL 10 MG/ML
INJECTION, EMULSION INTRAVENOUS PRN
Status: DISCONTINUED | OUTPATIENT
Start: 2022-10-24 | End: 2022-10-24 | Stop reason: SDUPTHER

## 2022-10-24 RX ORDER — ONDANSETRON 2 MG/ML
4 INJECTION INTRAMUSCULAR; INTRAVENOUS EVERY 6 HOURS PRN
Status: CANCELLED | OUTPATIENT
Start: 2022-10-24

## 2022-10-24 RX ORDER — DOCUSATE SODIUM 100 MG/1
100 CAPSULE, LIQUID FILLED ORAL 2 TIMES DAILY PRN
Status: DISCONTINUED | OUTPATIENT
Start: 2022-10-24 | End: 2022-10-27 | Stop reason: HOSPADM

## 2022-10-24 RX ORDER — SODIUM CHLORIDE 9 MG/ML
25 INJECTION, SOLUTION INTRAVENOUS PRN
Status: DISCONTINUED | OUTPATIENT
Start: 2022-10-24 | End: 2022-10-27 | Stop reason: HOSPADM

## 2022-10-24 RX ORDER — ONDANSETRON 2 MG/ML
4 INJECTION INTRAMUSCULAR; INTRAVENOUS EVERY 6 HOURS PRN
Status: DISCONTINUED | OUTPATIENT
Start: 2022-10-24 | End: 2022-10-27 | Stop reason: HOSPADM

## 2022-10-24 RX ORDER — METHYLERGONOVINE MALEATE 0.2 MG/ML
200 INJECTION INTRAVENOUS PRN
Status: DISCONTINUED | OUTPATIENT
Start: 2022-10-24 | End: 2022-10-27 | Stop reason: HOSPADM

## 2022-10-24 RX ORDER — DOCUSATE SODIUM 100 MG/1
100 CAPSULE, LIQUID FILLED ORAL 2 TIMES DAILY
Status: CANCELLED | OUTPATIENT
Start: 2022-10-24

## 2022-10-24 RX ORDER — CARBOPROST TROMETHAMINE 250 UG/ML
250 INJECTION, SOLUTION INTRAMUSCULAR PRN
Status: DISCONTINUED | OUTPATIENT
Start: 2022-10-24 | End: 2022-10-27 | Stop reason: HOSPADM

## 2022-10-24 RX ORDER — NALOXONE HYDROCHLORIDE 0.4 MG/ML
INJECTION, SOLUTION INTRAMUSCULAR; INTRAVENOUS; SUBCUTANEOUS PRN
Status: DISCONTINUED | OUTPATIENT
Start: 2022-10-24 | End: 2022-10-27 | Stop reason: HOSPADM

## 2022-10-24 RX ORDER — SODIUM CHLORIDE 0.9 % (FLUSH) 0.9 %
5-40 SYRINGE (ML) INJECTION EVERY 12 HOURS SCHEDULED
Status: CANCELLED | OUTPATIENT
Start: 2022-10-24

## 2022-10-24 RX ORDER — SODIUM CHLORIDE, SODIUM LACTATE, POTASSIUM CHLORIDE, CALCIUM CHLORIDE 600; 310; 30; 20 MG/100ML; MG/100ML; MG/100ML; MG/100ML
INJECTION, SOLUTION INTRAVENOUS CONTINUOUS PRN
Status: DISCONTINUED | OUTPATIENT
Start: 2022-10-24 | End: 2022-10-24 | Stop reason: SDUPTHER

## 2022-10-24 RX ORDER — BUPIVACAINE HYDROCHLORIDE 5 MG/ML
INJECTION, SOLUTION EPIDURAL; INTRACAUDAL PRN
Status: DISCONTINUED | OUTPATIENT
Start: 2022-10-24 | End: 2022-10-24 | Stop reason: SDUPTHER

## 2022-10-24 RX ORDER — BISACODYL 10 MG
10 SUPPOSITORY, RECTAL RECTAL DAILY PRN
Status: CANCELLED | OUTPATIENT
Start: 2022-10-24

## 2022-10-24 RX ORDER — MODIFIED LANOLIN
OINTMENT (GRAM) TOPICAL
Status: CANCELLED | OUTPATIENT
Start: 2022-10-24

## 2022-10-24 RX ADMIN — FENTANYL CITRATE 100 MCG: 50 INJECTION, SOLUTION INTRAMUSCULAR; INTRAVENOUS at 20:20

## 2022-10-24 RX ADMIN — SODIUM CHLORIDE, POTASSIUM CHLORIDE, SODIUM LACTATE AND CALCIUM CHLORIDE: 600; 310; 30; 20 INJECTION, SOLUTION INTRAVENOUS at 20:00

## 2022-10-24 RX ADMIN — PROPOFOL 20 MG: 10 INJECTION, EMULSION INTRAVENOUS at 20:16

## 2022-10-24 RX ADMIN — BUPIVACAINE HYDROCHLORIDE 4 ML: 5 INJECTION, SOLUTION EPIDURAL; INTRACAUDAL; PERINEURAL at 20:20

## 2022-10-24 RX ADMIN — KETOROLAC TROMETHAMINE 30 MG: 30 INJECTION, SOLUTION INTRAMUSCULAR at 20:34

## 2022-10-24 RX ADMIN — DEXAMETHASONE SODIUM PHOSPHATE 10 MG: 10 INJECTION, SOLUTION INTRAMUSCULAR; INTRAVENOUS at 20:20

## 2022-10-24 RX ADMIN — Medication 12 ML/HR: at 14:46

## 2022-10-24 RX ADMIN — ONDANSETRON 4 MG: 2 INJECTION INTRAMUSCULAR; INTRAVENOUS at 20:20

## 2022-10-24 RX ADMIN — CEFAZOLIN 2 G: 1 INJECTION, POWDER, FOR SOLUTION INTRAMUSCULAR; INTRAVENOUS at 20:07

## 2022-10-24 RX ADMIN — LIDOCAINE HYDROCHLORIDE AND EPINEPHRINE 8 ML: 20; 5 INJECTION, SOLUTION EPIDURAL; INFILTRATION; INTRACAUDAL; PERINEURAL at 20:03

## 2022-10-24 RX ADMIN — Medication 3 ML: at 14:45

## 2022-10-24 RX ADMIN — Medication 800 ML/HR: at 20:12

## 2022-10-24 RX ADMIN — Medication 1 MILLI-UNITS/MIN: at 10:17

## 2022-10-24 RX ADMIN — SODIUM CHLORIDE, POTASSIUM CHLORIDE, SODIUM LACTATE AND CALCIUM CHLORIDE: 600; 310; 30; 20 INJECTION, SOLUTION INTRAVENOUS at 10:15

## 2022-10-24 RX ADMIN — LIDOCAINE HYDROCHLORIDE AND EPINEPHRINE 10 ML: 20; 5 INJECTION, SOLUTION EPIDURAL; INFILTRATION; INTRACAUDAL; PERINEURAL at 20:00

## 2022-10-24 RX ADMIN — SODIUM CHLORIDE, POTASSIUM CHLORIDE, SODIUM LACTATE AND CALCIUM CHLORIDE: 600; 310; 30; 20 INJECTION, SOLUTION INTRAVENOUS at 20:30

## 2022-10-24 RX ADMIN — PROPOFOL 20 MG: 10 INJECTION, EMULSION INTRAVENOUS at 20:19

## 2022-10-24 NOTE — FLOWSHEET NOTE
[facility] in room 1414  Patient sitting up 1415  Time out 1418  Test dose 1436  Bolus 1442  Wedged to side 1441

## 2022-10-24 NOTE — FLOWSHEET NOTE
Dr Dewayne Nayak at bedside, AROM clear, large amount. SVE 4,80%, -1. Patient tolerated procedure well. Jo care performed.

## 2022-10-24 NOTE — PROGRESS NOTES
Department of Obstetrics and Gynecology  Labor and Delivery   Progress Note      SUBJECTIVE:  In to evaluate for AROM. Patient is Elective Induction @ 39.2 weeks. She is comfortable with epidural, currently on 10mU of Pitocin. OBJECTIVE:      Vitals:    /73   Pulse 89   Temp 98 °F (36.7 °C) (Oral)   Resp 17   Ht 5' 7\" (1.702 m)   Wt 247 lb 0.9 oz (112.1 kg)   LMP 01/22/2022 (Approximate)   SpO2 99%   BMI 38.69 kg/m²         Fetal heart rate: Category I       Baseline Heart Rate:  140s R       Accelerations:  present       Long Term Variability:  moderate       Decelerations:  variable         Contraction frequency: 3 minutes    Fetal Presentation:  Cephalic,     Membranes:  Ruptured clear fluid    Cervix:           Dilation:  4 cm         Effacement:  80%         Station:  -1         Consistency:  medium         Position:  mid          ASSESSMENT & PLAN:    Assessment:   IUP @ 39.2 weeks for Elective Induction  A Positive  GBS Negative    Plan:   Fetal Monitoring reassuring  AROM with copious amount of clear fluid. Continue with labor augmentation. Anticipate routine vaginal delivery.

## 2022-10-24 NOTE — H&P
Department of Obstetrics and Gynecology   History & Physical    Pt Name: Richard Blanco  MRN: [id number] [place] #: [de-identified]  YOB: 2000  Estimated Date of Delivery: 10/29/22      HPI: The patient is a 23 y.o. [id number] female  who presents for induction of labor at 44 + weeks    Allergies:     Allergies as of 10/24/2022    (No Known Allergies)       Medications:    Current Facility-Administered Medications:     oxytocin (PITOCIN) 30 units in 500 mL infusion, 1 tay-units/min, IntraVENous, Continuous, Praveen Che, , Last Rate: 1 mL/hr at 10/24/22 1017, 1 tay-units/min at 10/24/22 1017    lactated ringers infusion, , IntraVENous, Continuous, Shirley Estrada, , Last Rate: 125 mL/hr at 10/24/22 1015, New Bag at 10/24/22 1015    lactated ringers bolus, 500 mL, IntraVENous, PRN **OR** lactated ringers bolus, 1,000 mL, IntraVENous, PRN, Praveen Che, DO    sodium chloride flush 0.9 % injection 5-40 mL, 5-40 mL, IntraVENous, 2 times per day, Shirley Estrada, DO    sodium chloride flush 0.9 % injection 5-40 mL, 5-40 mL, IntraVENous, PRN, Praveen Che, DO    0.9 % sodium chloride infusion, 25 mL, IntraVENous, PRN, Praveen Che, DO    nalbuphine (NUBAIN) injection 5 mg, 5 mg, IntraMUSCular, Once PRN **AND** nalbuphine (NUBAIN) injection 5 mg, 5 mg, IntraVENous, Once PRN, Praveen Che, DO    ondansetron [facility]) injection 4 mg, 4 mg, IntraVENous, Q6H PRN, Praveen Che, DO    diphenhydrAMINE (BENADRYL) tablet 25 mg, 25 mg, Oral, Q4H PRN, Praveen Che, DO    methylergonovine (METHERGINE) injection 200 mcg, 200 mcg, IntraMUSCular, PRN, Praveen Che, DO    carboprost (HEMABATE) injection 250 mcg, 250 mcg, IntraMUSCular, PRN, Praveen Che DO    miSOPROStol (CYTOTEC) tablet 800 mcg, 800 mcg, Rectal, PRN, Praveen Che DO    tranexamic acid (CYKLOKAPRON) 1,000 mg in sodium chloride 0.9 % 110 mL IVPB (mini-bag), 1,000 mg, IntraVENous, Once PRN, Shirley Estrada DO acetaminophen (TYLENOL) tablet 650 mg, 650 mg, Oral, Q4H PRN, Kiran Che DO    benzocaine-menthol (DERMOPLAST) 20-0.5 % spray, , Topical, PRN, Kiran Che DO    docusate sodium (COLACE) capsule 100 mg, 100 mg, Oral, BID PRN, Kiran Che DO    naloxone 0.4 mg in 10 mL sodium chloride syringe, , IntraVENous, PRN, Kiran Che DO    ondansetron [facility]) injection 4 mg, 4 mg, IntraVENous, Q6H PRN, Kiran Che DO    fentaNYL 125 mcg, ropivacaine 0.2% in sodium chloride 0.9% 127.5ml (OB) epidural, 12 mL/hr, Epidural, Continuous, Kiran Stephen DO    [facility] History: [id number]    Gyn History: Denies h/o abnormal pap smear, h/o STDs. Past Medical History:   Past Medical History:   Diagnosis Date    Anxiety     BMI (body mass index), pediatric, 85% to less than 95% for age 2014    Chronic hypertension affecting pregnancy     Class 1 obesity due to excess calories without serious comorbidity with body mass index (BMI) of 33.0 to 33.9 in adult 2019    Depression     hx of depression    Generalized abdominal pain 2017    Headache(784.0)     History of depression 2013    Hypertension      labor third trimester with  delivery third trimester 2019       Past Surgical History:   Past Surgical History:   Procedure Laterality Date    TOOTH EXTRACTION      WISDOM TOOTH EXTRACTION         Social History:   Social History     Tobacco Use   Smoking Status Former   Smokeless Tobacco Never        Family History: Noncontributory; Denies h/o cancer. ROS:  Negative except as stated in HPI, denies nausea, vomiting, fever, chills, headache or dysuria.      PE:  Vitals:    10/24/22 0932   BP:    Pulse:    Resp:    Temp:    SpO2: 99%       General: well nourished, well developed, in no acute distress  CV: Normal heart sounds  Resp: breathing unlabored  Abdomen: Nontender, no rebound, no guarding  FH:  Cx:  3cm  NST - Cat 1: FHR 140s, moderate variability, +accels, -decels    Labs:       Assessment:   23 y.o. [id number] female at 44 weeks     Plan: pitocin induction      Sadia Alcantar DO

## 2022-10-24 NOTE — FLOWSHEET NOTE
Dr Madelaine Yusuf and Dr Sergio Santiago at [facility], viewed strip. Dr Madelaine Yusuf and Dr Sergio Santiago notified of late declels with interventions resolving. New orders to leave pitocin at current level of 10 and AROM at 1800. Patient notified of POC.

## 2022-10-24 NOTE — FLOWSHEET NOTE
Dr Hammer Bills in room, aware of patient arrival for induction. New orders for pitocin induction, per protocol at this time. Patient may have epidural. Patient agrees with POC.

## 2022-10-24 NOTE — ANESTHESIA PRE PROCEDURE
Department of Anesthesiology  Preprocedure Note       Name:  Deep Camejo   Age:  23 y. o.  :  2000                                          MRN:  [id number]         Date:  10/24/2022      Surgeon: * No surgeons listed *    Procedure: * No procedures listed *    Medications prior to admission:   Prior to Admission medications    Medication Sig Start Date End Date Taking?  Authorizing Provider   Prenatal Vit-Fe Fumarate-FA (PREPLUS) 27-1 MG TABS Take 1 tablet by mouth daily  Patient not taking: Reported on 2022 3/24/22 8/23/22  Mireille Caller, DO       Current medications:    Current Facility-Administered Medications   Medication Dose Route Frequency Provider Last Rate Last Admin    oxytocin (PITOCIN) 30 units in 500 mL infusion  1 tay-units/min IntraVENous Continuous Mireille Caller, DO        lactated ringers infusion   IntraVENous Continuous Mireille Caller, DO        lactated ringers bolus  500 mL IntraVENous PRN Mireille Caller, DO        Or    lactated ringers bolus  1,000 mL IntraVENous PRN Mireille Caller, DO        sodium chloride flush 0.9 % injection 5-40 mL  5-40 mL IntraVENous 2 times per day Mireille Caller, DO        sodium chloride flush 0.9 % injection 5-40 mL  5-40 mL IntraVENous PRN Mireille Caller, DO        0.9 % sodium chloride infusion  25 mL IntraVENous PRN Mireille Caller, DO        nalbuphine (NUBAIN) injection 5 mg  5 mg IntraMUSCular Once PRN Mireille Caller, DO        And    nalbuphine (NUBAIN) injection 5 mg  5 mg IntraVENous Once PRN Mireille Caller, DO        ondansetron [facility] injection 4 mg  4 mg IntraVENous Q6H PRN Mireille Caller, DO        diphenhydrAMINE (BENADRYL) tablet 25 mg  25 mg Oral Q4H PRN Mireille Caller, DO        methylergonovine (METHERGINE) injection 200 mcg  200 mcg IntraMUSCular PRN Mireille Caller, DO        carboprost (HEMABATE) injection 250 mcg  250 mcg IntraMUSCular PRN Mireille Caller, DO        miSOPROStol (CYTOTEC) tablet 800 mcg  800 mcg Rectal PRN Vearl Lush, DO        tranexamic acid (CYKLOKAPRON) 1,000 mg in sodium chloride 0.9 % 110 mL IVPB (mini-bag)  1,000 mg IntraVENous Once PRN Vearl Lush, DO        acetaminophen (TYLENOL) tablet 650 mg  650 mg Oral Q4H PRN Cole Buckleyon,         benzocaine-menthol (DERMOPLAST) 20-0.5 % spray   Topical PRN Vearl Lush, DO        docusate sodium (COLACE) capsule 100 mg  100 mg Oral BID PRN Vearl Lush, DO           Allergies:  No Known Allergies    Problem List:    Patient Active Problem List   Diagnosis Code    Obsessive-compulsive disorder F42.9    Migraine without aura and without status migrainosus, not intractable G43.009    Anxiety F41.9    Recurrent depression (HCC) F33.9    Class 1 obesity due to excess calories without serious comorbidity with body mass index (BMI) of 33.0 to 33.9 in adult E66.09, Z68.33    Dysfunctional uterine bleeding N93.8    Gastroesophageal reflux disease K21.9    Nausea and vomiting R11.2    Vitamin D deficiency E55.9    Right lower quadrant abdominal pain affecting pregnancy O26.899, R10.31    Normal labor O80, Z37.9    Pelvic pressure in pregnancy, antepartum, third trimester O26.893, R10.2    34 weeks gestation of pregnancy Z3A.34    Bilious vomiting R11.14    Cholesterolosis of gallbladder K82.4    Fatty (change of) liver, not elsewhere classified K76.0    Normal labor and delivery O80       Past Medical History:        Diagnosis Date    Anxiety     BMI (body mass index), pediatric, 85% to less than 95% for age 7/23/2014    Chronic hypertension affecting pregnancy     Class 1 obesity due to excess calories without serious comorbidity with body mass index (BMI) of 33.0 to 33.9 in adult 9/13/2019    Depression     hx of depression    Fatty (change of) liver, not elsewhere classified 9/23/2021    Generalized abdominal pain 11/7/2017    Headache(784.0)     History of depression 8/23/2013  Hypertension      labor third trimester with  delivery third trimester 2019       Past Surgical History:        Procedure Laterality Date    TOOTH EXTRACTION      WISDOM TOOTH EXTRACTION         Social History:    Social History     Tobacco Use    Smoking status: Former    Smokeless tobacco: Never   Substance Use Topics    Alcohol use: Not Currently                                Counseling given: Not Answered      Vital Signs (Current):   Vitals:    10/24/22 0818 10/24/22 0819 10/24/22 0932   BP: 133/87 (!) 131/91    Pulse: 96 (!) 107    Resp:  17    Temp:  36.8 °C (98.2 °F)    TempSrc:  Oral    SpO2:   99%                                              BP Readings from Last 3 Encounters:   10/24/22 (!) 131/91   10/20/22 126/88   10/13/22 138/88       NPO Status:                                                                                 BMI:   Wt Readings from Last 3 Encounters:   10/20/22 241 lb (109.3 kg)   10/13/22 243 lb (110.2 kg)   10/06/22 243 lb (110.2 kg)     There is no height or weight on file to calculate BMI.    CBC:   Lab Results   Component Value Date/Time    WBC 7.9 2022 12:22 PM    RBC 4.10 2022 12:22 PM    RBC 4.45 2018 03:22 PM    HGB 10.3 2022 12:22 PM    HCT 31.0 2022 12:22 PM    MCV 75.7 2022 12:22 PM    RDW 15.1 2022 12:22 PM     2022 12:22 PM       CMP:   Lab Results   Component Value Date/Time     2022 12:22 PM    K 3.7 2022 12:22 PM    K 4.2 2021 06:30 PM     2022 12:22 PM    CO2 22 2022 12:22 PM    BUN 6 2022 12:22 PM    CREATININE 0.38 2022 12:22 PM    GFRAA >60.0 2022 12:22 PM    AGRATIO 1.6 2018 03:22 PM    LABGLOM >60.0 2022 12:22 PM    GLUCOSE 92 2022 12:22 PM    GLUCOSE 78 2018 03:22 PM    PROT 6.4 2022 12:22 PM    CALCIUM 8.4 2022 12:22 PM    BILITOT 0.3 2022 12:22 PM    ALKPHOS 122 2022 12:22 PM    AST 10 09/19/2022 12:22 PM    ALT 9 09/19/2022 12:22 PM       POC Tests: No results for input(s): POCGLU, POCNA, POCK, POCCL, POCBUN, POCHEMO, POCHCT in the last 72 hours. Coags: No results found for: PROTIME, INR, APTT    HCG (If Applicable):   Lab Results   Component Value Date    PREGTESTUR positive 09/04/2018    PREGSERUM Positive 07/03/2018    HCGQUANT 67837 07/06/2018        ABGs: No results found for: PHART, PO2ART, BCG9EPS, VQA9VEU, BEART, P8LYRFFQ     Type & Screen (If Applicable):  No results found for: LABABO, LABRH    Drug/Infectious Status (If Applicable):  Lab Results   Component Value Date/Time    HEPCAB NONREACTIVE 03/30/2022 01:31 PM       COVID-19 Screening (If Applicable):   Lab Results   Component Value Date/Time    COVID19 Not Detected 07/06/2021 06:15 PM           Anesthesia Evaluation  Patient summary reviewed and Nursing notes reviewed  Airway: Mallampati: II  TM distance: >3 FB   Neck ROM: full  Mouth opening: > = 3 FB   Dental: normal exam         Pulmonary:                              Cardiovascular:                      Neuro/Psych:               GI/Hepatic/Renal:             Endo/Other:                     Abdominal:             Vascular: Other Findings:           Anesthesia Plan      epidural     ASA 2             Anesthetic plan and risks discussed with patient. Use of blood products discussed with patient whom consented to blood products.                      ANNEL Henley CRNA   10/24/2022

## 2022-10-24 NOTE — ANESTHESIA PROCEDURE NOTES
Epidural Block    Patient location during procedure: OB  Start time: 10/24/2022 2:35 PM  End time: 10/24/2022 2:40 PM  Reason for block: labor epidural  Staffing  Performed: resident/CRNA   Resident/CRNA: ANNEL Mckenna - LARISA  Epidural  Patient position: sitting  Prep: ChloraPrep and site prepped and draped  Patient monitoring: continuous pulse ox and frequent blood pressure checks  Approach: midline  Location: L3-4  Injection technique: ROSANNA saline  Provider prep: mask and sterile gloves  Needle  Needle type: Tuohy   Needle gauge: 17 G  Needle length: 3.5 in  Needle insertion depth: 10 cm  Catheter type: side hole  Catheter size: 19 G  Catheter at skin depth: 15 cm  Test dose: negative (@1443 4 ml 1.5% lido w 1:200k epi)Catheter Secured: tegaderm and tape  Assessment  Sensory level: T6  Hemodynamics: stable  Attempts: 1  Outcomes: uncomplicated  Additional Notes  Negative heme, negative CSF to catheter aspiration.  Negative parasthesia  Preanesthetic Checklist  Completed: patient identified, IV checked, risks and benefits discussed, surgical/procedural consents, equipment checked, pre-op evaluation, timeout performed, anesthesia consent given, oxygen available, monitors applied/VS acknowledged, fire risk safety assessment completed and verbalized and blood product R/B/A discussed and consented

## 2022-10-25 LAB
ANISOCYTOSIS: ABNORMAL
BASOPHILS ABSOLUTE: 0 K/UL (ref 0–0.2)
BASOPHILS RELATIVE PERCENT: 0.2 %
EOSINOPHILS ABSOLUTE: 0 K/UL (ref 0–0.7)
EOSINOPHILS RELATIVE PERCENT: 0.3 %
HCT VFR BLD CALC: 29 % (ref 37–47)
HEMOGLOBIN: 8.8 G/DL (ref 12–16)
LYMPHOCYTES ABSOLUTE: 1 K/UL (ref 1–4.8)
LYMPHOCYTES RELATIVE PERCENT: 5.8 %
MCH RBC QN AUTO: 22.6 PG (ref 27–31.3)
MCHC RBC AUTO-ENTMCNC: 30.3 % (ref 33–37)
MCV RBC AUTO: 74.7 FL (ref 79.4–94.8)
MICROCYTES: ABNORMAL
MONOCYTES ABSOLUTE: 0.5 K/UL (ref 0.2–0.8)
MONOCYTES RELATIVE PERCENT: 2.6 %
NEUTROPHILS ABSOLUTE: 15.6 K/UL (ref 1.4–6.5)
NEUTROPHILS RELATIVE PERCENT: 91.1 %
PDW BLD-RTO: 15.2 % (ref 11.5–14.5)
PLATELET # BLD: 312 K/UL (ref 130–400)
PLATELET SLIDE REVIEW: NORMAL
POIKILOCYTES: ABNORMAL
RBC # BLD: 3.88 M/UL (ref 4.2–5.4)
WBC # BLD: 17.1 K/UL (ref 4.8–10.8)

## 2022-10-25 PROCEDURE — 6370000000 HC RX 637 (ALT 250 FOR IP): Performed by: OBSTETRICS & GYNECOLOGY

## 2022-10-25 PROCEDURE — 36415 COLL VENOUS BLD VENIPUNCTURE: CPT

## 2022-10-25 PROCEDURE — 6360000002 HC RX W HCPCS: Performed by: OBSTETRICS & GYNECOLOGY

## 2022-10-25 PROCEDURE — 2580000003 HC RX 258: Performed by: OBSTETRICS & GYNECOLOGY

## 2022-10-25 PROCEDURE — 1220000000 HC SEMI PRIVATE OB R&B

## 2022-10-25 PROCEDURE — 99024 POSTOP FOLLOW-UP VISIT: CPT | Performed by: OBSTETRICS & GYNECOLOGY

## 2022-10-25 PROCEDURE — 85025 COMPLETE CBC W/AUTO DIFF WBC: CPT

## 2022-10-25 RX ORDER — SIMETHICONE 80 MG
80 TABLET,CHEWABLE ORAL EVERY 6 HOURS PRN
Status: DISCONTINUED | OUTPATIENT
Start: 2022-10-25 | End: 2022-10-27 | Stop reason: HOSPADM

## 2022-10-25 RX ORDER — OXYCODONE HYDROCHLORIDE AND ACETAMINOPHEN 5; 325 MG/1; MG/1
2 TABLET ORAL EVERY 4 HOURS PRN
Status: DISCONTINUED | OUTPATIENT
Start: 2022-10-25 | End: 2022-10-27 | Stop reason: HOSPADM

## 2022-10-25 RX ORDER — KETOROLAC TROMETHAMINE 30 MG/ML
30 INJECTION, SOLUTION INTRAMUSCULAR; INTRAVENOUS EVERY 6 HOURS PRN
Status: DISPENSED | OUTPATIENT
Start: 2022-10-25 | End: 2022-10-26

## 2022-10-25 RX ADMIN — ACETAMINOPHEN 650 MG: 325 TABLET ORAL at 06:42

## 2022-10-25 RX ADMIN — OXYCODONE AND ACETAMINOPHEN 1 TABLET: 5; 325 TABLET ORAL at 17:27

## 2022-10-25 RX ADMIN — OXYCODONE AND ACETAMINOPHEN 2 TABLET: 5; 325 TABLET ORAL at 03:28

## 2022-10-25 RX ADMIN — OXYCODONE AND ACETAMINOPHEN 1 TABLET: 5; 325 TABLET ORAL at 11:54

## 2022-10-25 RX ADMIN — ACETAMINOPHEN 650 MG: 325 TABLET ORAL at 00:44

## 2022-10-25 RX ADMIN — IRON SUCROSE 500 MG: 20 INJECTION, SOLUTION INTRAVENOUS at 09:49

## 2022-10-25 ASSESSMENT — PAIN DESCRIPTION - LOCATION
LOCATION: ABDOMEN
LOCATION: INCISION
LOCATION: ABDOMEN
LOCATION: ABDOMEN

## 2022-10-25 ASSESSMENT — PAIN - FUNCTIONAL ASSESSMENT
PAIN_FUNCTIONAL_ASSESSMENT: ACTIVITIES ARE NOT PREVENTED
PAIN_FUNCTIONAL_ASSESSMENT: ACTIVITIES ARE NOT PREVENTED

## 2022-10-25 ASSESSMENT — PAIN DESCRIPTION - DESCRIPTORS
DESCRIPTORS: DISCOMFORT
DESCRIPTORS: CRAMPING;ACHING
DESCRIPTORS: SORE

## 2022-10-25 ASSESSMENT — PAIN SCALES - GENERAL
PAINLEVEL_OUTOF10: 3
PAINLEVEL_OUTOF10: 2
PAINLEVEL_OUTOF10: 5
PAINLEVEL_OUTOF10: 6
PAINLEVEL_OUTOF10: 8

## 2022-10-25 NOTE — PROGRESS NOTES
C Section Postpartum Progress Note    Subjective:      23 y.o.  [id number] @ 39w2d    Postpartum Day 1:  Delivery for cord prolapse    The patient feels well. The patient denies emotional concerns. Pain is well controlled with current medications. The baby iswell. Baby is feeding via breast. Urinary output is adequate. The patient is ambulating well. The patient is tolerating a normal diet. Flatus denies been passed. Objective:      Patient Vitals for the past 8 hrs:   BP Temp Temp src Pulse Resp SpO2   10/25/22 0637 (!) 142/92 98.7 °F (37.1 °C) Oral 78 18 98 %   10/25/22 0328 -- -- -- -- 18 --   10/24/22 2317 137/76 98.2 °F (36.8 °C) Oral 93 16 99 %     General:    alert, appears stated age, and cooperative   Bowel Sounds:  active   Lochia:  appropriate   Uterine Fundus:   firm   Incision:  healing well, no significant drainage, no dehiscence, no significant erythema   DVT Evaluation:  No evidence of DVT seen on physical exam.  Negative Johnathan's sign. Assessment:     Status post  section. Doing well postoperatively. Anemia due to blood loss  Plan: 1. Routine post op care    Venofer.     Josh Galindo DO

## 2022-10-25 NOTE — LACTATION NOTE
This note was copied from a baby's chart.  -initial lactation consult  -mom has been mostly formula feeding, citing fatigue and latch difficulties  -reports intent to breastfeed  -provided with breastfeeding info guide and reviewed normal infant feeding patterns, benefits of skin to skin and observing for hunger cues  -mom verbalized understanding  -encouraged to call for next feed    1342-follow up  -mom reports baby last took a bottle of formula while she was napping  -offered assistance with breastfeeding if desired  -mother declines at this time  -baby currently swaddled and asleep in crib, not showing feeding cues at this time  -offered support for feeding choice and encouraged to call for help as needed  -mom verbalized understanding

## 2022-10-25 NOTE — ANESTHESIA POSTPROCEDURE EVALUATION
Department of Anesthesiology  Postprocedure Note    Patient: Devan Lopez  MRN: [id number]  YOB: 2000  Date of evaluation: 10/24/2022      Procedure Summary     Date: 10/24/22 Room / Location:     Anesthesia Start: 1430 Anesthesia Stop: 2058    Procedure: Labor Analgesia Diagnosis:     Scheduled Providers:  Responsible Provider: ANNEL Mcdonald CRNA    Anesthesia Type: epidural ASA Status: 2          Anesthesia Type: No value filed.     Leodan Phase I:      Leodan Phase II:        Anesthesia Post Evaluation    Patient location during evaluation: PACU  Patient participation: complete - patient participated  Level of consciousness: awake and alert  Pain score: 0  Airway patency: patent  Nausea & Vomiting: no vomiting and no nausea  Complications: no  Cardiovascular status: blood pressure returned to baseline and hemodynamically stable  Respiratory status: nonlabored ventilation, spontaneous ventilation and acceptable  Hydration status: euvolemic

## 2022-10-25 NOTE — OP NOTE
Department of Obstetrics and Gynecology   Section Note      Indications: Acute cord prolapse    Pre-operative Diagnosis: IUP at 39 weeks. Face presentation. Cord prolapse    Post-operative Diagnosis: Same with the delivery of a viable male    Surgeon: Michael Jimenez DO    Assistants: The use of a first assistant was necessary because there was no qualified resident surgeon available. Maroa Areas assisted in the proper positioning, prepping, and draping of the patient, intraoperative retraction and suctioning for visualization, passing sutures and suture management. Anesthesia: Epidural    HPI: This is a 20-year-old female  6 para 2-0-3-2. Who is here for induction of labor at 39 weeks and 2 days. Patient is large for gestational age fetus. Procedure Details  The risks, benefits, complications, treatment options, and expected outcomes were discussed with the patient. The patient concurred with the proposed plan, giving informed consent. The site of surgery properly noted/marked. The patient was taken to Operating Room  identified as Lenka Snow and the procedure verified as  Delivery. A Time Out was held and the above information confirmed. This was an emergency  section due to the cord prolapse. Sponges and sharps were counted before the procedure. The patient's abdomen was prepped in the proper fashion. A nurse was lifting the baby's head up off of the cord from the vaginal route   the patient was draped and prepped in the usual sterile manner. A transverse suprapubic skin incision was made then the abdomen was opened with a Pfannenstiel incision. Fascial incision was made and extended transversely. The fascia was  from the underlying rectus tissue superiorly and inferiorly. The peritoneum was identified and entered. Peritoneal incision was extended longitudinally.  The utero-vesical peritoneal reflection was incised transversely and the bladder flap was bluntly freed from the lower uterine segment. The myometrium was scored and the intrauterine cavity was entered bluntly. The myometrial incision was extended bilaterally. The head was delivered with a face presentation the head was brought into flexion was delivered from the low segment incision. The nose and mouth were suctioned free of secretions. there was a nuchal cord released without difficulty. the infant was vigorous and spontaneous respirations occurred. The umbilical cord was clamped and cut and the baby was handed to the waiting RN. It was a viable male. TOB was . The baby weighed 4038 grams or 8 pounds 14 ounces lbs. .Apgar scores of 8 at one minute and 9 at five minutes. Cord blood was obtained for evaluation. The placenta was removed intact and appeared normal. Membranes and cotyledons were intact. The intrauterine cavity was wiped free of all membranes. The uterine outline, tubes and ovaries appeared normal. The uterine incision was closed with running sutures of 0 Vicryl. Double layered closure. Hemostasis was observed. Surgicel powder was sprayed over the myometrial incision. The peritoneum was closed with 0-Vicryl. The fascial layers were then reapproximated with running sutures of #1 Vicryl. The subcutaneous layer was closed with 3-0 Vicryl. The skin  edge was reapproximated with Insorb staples. The incision was cleansed and appropriately dressed. The uterus was cleared of any clots and irrigating fluid. Instrument, sponge, and needle counts were correct prior the abdominal closure and at the conclusion of the case. Findings:  Face presentation multiple loops of cord in the vagina. The head was lifted out of the pelvis by the RN as we moved back to the operating room. The fetal heart rate was stable into the back. She was prepped in an appropriate fashion with 30 seconds of prep over the planned incision line.   Sponges were counted prior to the procedure  There was a nuchal cord and a true knot in the cord. The infant was vigorous and spontaneous respirations occurred  EBL: 1000 cc         Drains: Ham                Specimens: Placenta                    Complications: none    Disposition: to recovery room in stable condition           Condition: stable  Per protocol an abdominal flat plate xray was taken. I read it and it was negative.  A formal read will be made by the radiologist      Mireille Ferreira DO  October 24, 2022

## 2022-10-25 NOTE — FLOWSHEET NOTE
Dr. Whitney Galan at patient bedside. Informed of Hemoglobin decrease to 8.8. Per Dr. Whitney Galan he will place order for IV Venofer.

## 2022-10-25 NOTE — PLAN OF CARE
Problem: Pain  Goal: Verbalizes/displays adequate comfort level or baseline comfort level  10/25/2022 1119 by Shira Rosas RN  Outcome: Progressing  10/25/2022 0410 by Omar Glez RN  Outcome: Progressing     Problem: Safety - Adult  Goal: Free from fall injury  10/25/2022 1119 by Shira Rosas RN  Outcome: Progressing  10/25/2022 0410 by Omar Glez RN  Outcome: Progressing

## 2022-10-25 NOTE — FLOWSHEET NOTE
Called Dr. Yohana Marquez to inform x-ray results are in Epic. Results: negative for any foreign body in abdomen/pelvis.

## 2022-10-25 NOTE — FLOWSHEET NOTE
Second nurse at patient bedside. SVE performed + cord. Both nurse continuous stay at patient bedside, one nurse continuous ultrasound adjustment, infant heart rate 138bpm, moderate variability with accelerations and variables, second nurse hold infant head off cord. Call for provider at [facility]. Second call for provider place at 5. Provider at patient bedside , SVE performed and confirm cord prolapse. Emergency  declare at 26. CRNA call at 26. Nurses continuous stay at patient bedside and prep for OR second nurse continuous hold infant head off of cord.

## 2022-10-25 NOTE — FLOWSHEET NOTE
Viable infant baby boy born at 2011. Dr. Lo Hall present in [address] [place] [address] with nursery nurse with infant in  open crib.

## 2022-10-25 NOTE — FLOWSHEET NOTE
SVE performed outer os 5-6cm, inner os 3cm, feel infant head and possible cord, request SVE check from another nurse.

## 2022-10-26 PROCEDURE — 6370000000 HC RX 637 (ALT 250 FOR IP): Performed by: OBSTETRICS & GYNECOLOGY

## 2022-10-26 PROCEDURE — 1220000000 HC SEMI PRIVATE OB R&B

## 2022-10-26 PROCEDURE — 99024 POSTOP FOLLOW-UP VISIT: CPT | Performed by: OBSTETRICS & GYNECOLOGY

## 2022-10-26 RX ORDER — IBUPROFEN 600 MG/1
600 TABLET ORAL EVERY 6 HOURS PRN
Status: DISCONTINUED | OUTPATIENT
Start: 2022-10-26 | End: 2022-10-27 | Stop reason: HOSPADM

## 2022-10-26 RX ORDER — BISACODYL 10 MG
10 SUPPOSITORY, RECTAL RECTAL DAILY PRN
Status: DISCONTINUED | OUTPATIENT
Start: 2022-10-26 | End: 2022-10-27 | Stop reason: HOSPADM

## 2022-10-26 RX ADMIN — OXYCODONE AND ACETAMINOPHEN 2 TABLET: 5; 325 TABLET ORAL at 23:19

## 2022-10-26 RX ADMIN — OXYCODONE AND ACETAMINOPHEN 1 TABLET: 5; 325 TABLET ORAL at 00:10

## 2022-10-26 RX ADMIN — SIMETHICONE 80 MG: 80 TABLET, CHEWABLE ORAL at 00:10

## 2022-10-26 RX ADMIN — OXYCODONE AND ACETAMINOPHEN 2 TABLET: 5; 325 TABLET ORAL at 13:16

## 2022-10-26 RX ADMIN — DOCUSATE SODIUM 100 MG: 100 CAPSULE, LIQUID FILLED ORAL at 07:40

## 2022-10-26 RX ADMIN — IBUPROFEN 600 MG: 600 TABLET, FILM COATED ORAL at 18:47

## 2022-10-26 RX ADMIN — OXYCODONE AND ACETAMINOPHEN 2 TABLET: 5; 325 TABLET ORAL at 07:39

## 2022-10-26 RX ADMIN — SIMETHICONE 80 MG: 80 TABLET, CHEWABLE ORAL at 07:42

## 2022-10-26 RX ADMIN — BISACODYL 10 MG: 10 SUPPOSITORY RECTAL at 13:17

## 2022-10-26 ASSESSMENT — PAIN DESCRIPTION - LOCATION: LOCATION: ABDOMEN

## 2022-10-26 ASSESSMENT — PAIN DESCRIPTION - DESCRIPTORS
DESCRIPTORS: SHOOTING;SHARP
DESCRIPTORS: ACHING;SORE

## 2022-10-26 ASSESSMENT — PAIN SCALES - GENERAL
PAINLEVEL_OUTOF10: 7
PAINLEVEL_OUTOF10: 7
PAINLEVEL_OUTOF10: 0
PAINLEVEL_OUTOF10: 4
PAINLEVEL_OUTOF10: 4

## 2022-10-26 ASSESSMENT — PAIN - FUNCTIONAL ASSESSMENT: PAIN_FUNCTIONAL_ASSESSMENT: PREVENTS OR INTERFERES SOME ACTIVE ACTIVITIES AND ADLS

## 2022-10-26 NOTE — PLAN OF CARE
Problem: Pain  Goal: Verbalizes/displays adequate comfort level or baseline comfort level  10/25/2022 2343 by Iris Thornton RN  Outcome: Progressing  10/25/2022 1119 by Aline Vail RN  Outcome: Progressing     Problem: Safety - Adult  Goal: Free from fall injury  10/25/2022 2343 by Iris Thornton RN  Outcome: Progressing  10/25/2022 1119 by Aline Vail RN  Outcome: Progressing 4

## 2022-10-26 NOTE — PROGRESS NOTES
C Section Postpartum Progress Note    Subjective:      23 y.o.  [id number] @ 39w2d    Postpartum Day 2:  Delivery for cord prolapse    The patient feels well. The patient denies emotional concerns. Pain is moderately controlled with current medications. The baby iswell. Baby is feeding via breast. Urinary output is adequate. The patient is ambulating well. The patient is tolerating a normal diet. Flatus denies been passed. Objective:      Patient Vitals for the past 8 hrs:   BP Temp Temp src Pulse Resp SpO2   10/26/22 0814 133/88 97.8 °F (36.6 °C) Oral 86 16 --   10/26/22 0208 135/78 -- -- 94 -- --   10/26/22 0120 (!) 147/82 -- -- (!) 105 -- 98 %     General:    alert, appears stated age, and cooperative   Bowel Sounds:  active   Lochia:  appropriate   Uterine Fundus:   firm   Incision:  healing well, no significant drainage, no dehiscence, no significant erythema   DVT Evaluation:  No evidence of DVT seen on physical exam.         Assessment:     Status post  section. Doing well postoperatively. Plan: 1. Routine post op care    Continue current care.   Possible discharge later today  Prince Yin DO

## 2022-10-26 NOTE — PLAN OF CARE
Problem: Pain  Goal: Verbalizes/displays adequate comfort level or baseline comfort level  10/26/2022 0943 by Sadia Brewer RN  Outcome: Progressing  10/25/2022 2343 by Mango Flowers RN  Outcome: Progressing     Problem: Safety - Adult  Goal: Free from fall injury  10/26/2022 0943 by Sadia Brewer RN  Outcome: Progressing  10/25/2022 2343 by Mango Flowers RN  Outcome: Progressing

## 2022-10-27 VITALS
DIASTOLIC BLOOD PRESSURE: 67 MMHG | TEMPERATURE: 97.1 F | HEIGHT: 67 IN | HEART RATE: 102 BPM | RESPIRATION RATE: 16 BRPM | WEIGHT: 247.06 LBS | SYSTOLIC BLOOD PRESSURE: 136 MMHG | BODY MASS INDEX: 38.78 KG/M2 | OXYGEN SATURATION: 98 %

## 2022-10-27 PROCEDURE — S9443 LACTATION CLASS: HCPCS

## 2022-10-27 PROCEDURE — 6370000000 HC RX 637 (ALT 250 FOR IP): Performed by: OBSTETRICS & GYNECOLOGY

## 2022-10-27 PROCEDURE — 99024 POSTOP FOLLOW-UP VISIT: CPT | Performed by: OBSTETRICS & GYNECOLOGY

## 2022-10-27 RX ORDER — LANOLIN ALCOHOL/MO/W.PET/CERES
325 CREAM (GRAM) TOPICAL 2 TIMES DAILY
Qty: 90 TABLET | Refills: 3 | Status: SHIPPED | OUTPATIENT
Start: 2022-10-27

## 2022-10-27 RX ORDER — OXYCODONE HYDROCHLORIDE AND ACETAMINOPHEN 5; 325 MG/1; MG/1
1 TABLET ORAL EVERY 6 HOURS PRN
Qty: 28 TABLET | Refills: 0 | Status: SHIPPED | OUTPATIENT
Start: 2022-10-27 | End: 2022-11-03

## 2022-10-27 RX ORDER — IBUPROFEN 600 MG/1
600 TABLET ORAL 4 TIMES DAILY PRN
Qty: 60 TABLET | Refills: 3 | Status: SHIPPED | OUTPATIENT
Start: 2022-10-27

## 2022-10-27 RX ADMIN — IBUPROFEN 600 MG: 600 TABLET, FILM COATED ORAL at 08:53

## 2022-10-27 ASSESSMENT — PAIN SCALES - GENERAL: PAINLEVEL_OUTOF10: 6

## 2022-10-27 NOTE — LACTATION NOTE
In to visit pt  Pt has a breast pump  Mother had a c/section  Mother gives history of attempting to breast feed  Encouraged mother to breast feed every 2-3 hours for 10-20 minutes per breast  Reviewed intake and output of the  ,  the supply and demand   Spoke with mother about using the football position to feed infant  Mother bottle fed infant the last 24 hours with one attempt at breast feeding

## 2022-10-27 NOTE — DISCHARGE SUMMARY
Obstetric Discharge Summary    Reasons for Admission on 10/24/2022  7:06 AM  Induction of Labor    Prenatal Procedures  None    Intrapartum Procedures  Primary [facility]    Postpartum Procedures  None    [place] Data  Information for the patient's :  Gabriella Mosqueda [facility] [[id number]]   male   Birth Weight: 8 lb 14.4 oz (4.038 kg)   Discharge With Mother  Complications: No    Discharge Diagnosis  Patient Active Problem List    Diagnosis Date Noted    Normal labor and delivery 10/24/2022    39 weeks gestation of pregnancy 10/24/2022    Encounter for elective induction of labor 10/24/2022    Pelvic pressure in pregnancy, antepartum, third trimester 2022    34 weeks gestation of pregnancy 2022    Bilious vomiting 2021    Cholesterolosis of gallbladder 2021    Fatty (change of) liver, not elsewhere classified 2021    Normal labor 2021    Right lower quadrant abdominal pain affecting pregnancy     Dysfunctional uterine bleeding 2020    Gastroesophageal reflux disease 2020    Nausea and vomiting 2020    Vitamin D deficiency 2020    Recurrent depression ([facility] [address] 2019    Class 1 obesity due to excess calories without serious comorbidity with body mass index (BMI) of 33.0 to 33.9 in adult 2019    Migraine without aura and without status migrainosus, not intractable 2017    Anxiety 2017    Obsessive-compulsive disorder 08/10/2017   Acute blood loss anemia    Discharge Information  Current Discharge Medication List        STOP taking these medications       Prenatal Vit-Fe Fumarate-FA (PREPLUS) 27-1 MG TABS Comments:   Reason for Stopping:               No discharge procedures on file.     Discharge to: Home        Discharge Date: 2022      Clive Busby DO  2022

## 2022-10-27 NOTE — PLAN OF CARE
Problem: Pain  Goal: Verbalizes/displays adequate comfort level or baseline comfort level  10/26/2022 2151 by Shyam Agosto RN  Outcome: Progressing  10/26/2022 0943 by Sabrina Pal RN  Outcome: Progressing     Problem: Safety - Adult  Goal: Free from fall injury  10/26/2022 2151 by Shyam Agosto RN  Outcome: Progressing  10/26/2022 0943 by Sabrina Pal RN  Outcome: Progressing

## 2022-11-08 ENCOUNTER — OFFICE VISIT (OUTPATIENT)
Dept: OBGYN CLINIC | Age: 22
End: 2022-11-08
Payer: COMMERCIAL

## 2022-11-08 VITALS
SYSTOLIC BLOOD PRESSURE: 132 MMHG | WEIGHT: 236 LBS | HEART RATE: 84 BPM | DIASTOLIC BLOOD PRESSURE: 86 MMHG | BODY MASS INDEX: 36.96 KG/M2

## 2022-11-08 DIAGNOSIS — B37.2 YEAST INFECTION OF THE SKIN: ICD-10-CM

## 2022-11-08 PROCEDURE — 99213 OFFICE O/P EST LOW 20 MIN: CPT | Performed by: OBSTETRICS & GYNECOLOGY

## 2022-11-08 PROCEDURE — 1111F DSCHRG MED/CURRENT MED MERGE: CPT | Performed by: OBSTETRICS & GYNECOLOGY

## 2022-11-08 PROCEDURE — G8417 CALC BMI ABV UP PARAM F/U: HCPCS | Performed by: OBSTETRICS & GYNECOLOGY

## 2022-11-08 PROCEDURE — 1036F TOBACCO NON-USER: CPT | Performed by: OBSTETRICS & GYNECOLOGY

## 2022-11-08 PROCEDURE — G8428 CUR MEDS NOT DOCUMENT: HCPCS | Performed by: OBSTETRICS & GYNECOLOGY

## 2022-11-08 PROCEDURE — G8484 FLU IMMUNIZE NO ADMIN: HCPCS | Performed by: OBSTETRICS & GYNECOLOGY

## 2022-11-08 RX ORDER — CLOTRIMAZOLE AND BETAMETHASONE DIPROPIONATE 10; .64 MG/G; MG/G
CREAM TOPICAL
Qty: 45 G | Refills: 1 | Status: SHIPPED | OUTPATIENT
Start: 2022-11-08

## 2022-11-08 RX ORDER — FLUCONAZOLE 150 MG/1
150 TABLET ORAL
Qty: 3 TABLET | Refills: 1 | Status: SHIPPED | OUTPATIENT
Start: 2022-11-08

## 2022-11-08 RX ORDER — DOCUSATE SODIUM 100 MG/1
100 CAPSULE, LIQUID FILLED ORAL DAILY PRN
Qty: 30 CAPSULE | Refills: 0 | Status: SHIPPED | OUTPATIENT
Start: 2022-11-08

## 2022-11-08 ASSESSMENT — ENCOUNTER SYMPTOMS
ABDOMINAL DISTENTION: 0
ABDOMINAL PAIN: 0
CHEST TIGHTNESS: 0
BLOOD IN STOOL: 0
COUGH: 0
SHORTNESS OF BREATH: 0
WHEEZING: 0
BACK PAIN: 0
NAUSEA: 0
COLOR CHANGE: 0
TROUBLE SWALLOWING: 0
CONSTIPATION: 0
SORE THROAT: 0
VOMITING: 0
VOICE CHANGE: 0

## 2022-11-08 NOTE — PROGRESS NOTES
HPI:  Faith Hua (: 2000) is a 23 y.o. female, Established patient, here for evaluation of the following chief complaint(s):  Postpartum Care (Incision check;red and bumpy rash below the incision)  Patient is here 1 week following a primary  section. She had a cord prolapse. Complaint is irritation and itching on the incision. Is doing well. Her lochia is scant and she has some constipation. SUBJECTIVE/OBJECTIVE:    Past Surgical History:   Procedure Laterality Date     SECTION N/A 10/24/2022     SECTION performed by Paul Talavera DO at [facility]    TOOTH EXTRACTION      WISDOM TOOTH EXTRACTION          Review of Systems   Constitutional:  Negative for activity change, appetite change, fatigue and unexpected weight change. HENT:  Negative for dental problem, ear pain, hearing loss, nosebleeds, sore throat, trouble swallowing and voice change. Eyes:  Negative for visual disturbance. Respiratory:  Negative for cough, chest tightness, shortness of breath and wheezing. Cardiovascular:  Negative for chest pain and palpitations. Gastrointestinal:  Negative for abdominal distention, abdominal pain, blood in stool, constipation, nausea and vomiting. Endocrine: Negative for cold intolerance, heat intolerance, polydipsia, polyphagia and polyuria. Genitourinary:  Negative for difficulty urinating, dyspareunia, dysuria, flank pain, frequency, genital sores, hematuria, menstrual problem, pelvic pain, urgency, vaginal bleeding, vaginal discharge and vaginal pain. Musculoskeletal:  Negative for arthralgias, back pain, joint swelling and myalgias. Skin:  Negative for color change and rash. Allergic/Immunologic: Negative for environmental allergies, food allergies and immunocompromised state. Neurological:  Negative for dizziness, seizures, syncope, speech difficulty, weakness, numbness and headaches. Hematological:  Negative for adenopathy.  Does not bruise/bleed easily. Psychiatric/Behavioral:  Negative for agitation, behavioral problems, confusion, decreased concentration, dysphoric mood and suicidal ideas. The patient is not nervous/anxious and is not hyperactive. Physical Exam  Vitals and nursing note reviewed. Exam conducted with a chaperone present. Constitutional:       Appearance: She is well-developed. HENT:      Head: Normocephalic and atraumatic. Eyes:      Pupils: Pupils are equal, round, and reactive to light. Neck:      Thyroid: No thyromegaly. Trachea: No tracheal deviation. Cardiovascular:      Rate and Rhythm: Normal rate and regular rhythm. Heart sounds: Normal heart sounds. Pulmonary:      Effort: Pulmonary effort is normal.      Breath sounds: Normal breath sounds. Chest:      Chest wall: No tenderness. Abdominal:      General: Bowel sounds are normal. There is no distension. Palpations: Abdomen is soft. There is no mass. Tenderness: There is no abdominal tenderness. There is no guarding or rebound. Hernia: There is no hernia in the left inguinal area. Genitourinary:     Labia:         Right: No rash, tenderness, lesion or injury. Left: No rash, tenderness, lesion or injury. Vagina: Normal. No foreign body. No vaginal discharge, erythema, tenderness or bleeding. Cervix: No cervical motion tenderness or discharge. Uterus: Not deviated, not enlarged, not fixed and not tender. Adnexa:         Right: No mass, tenderness or fullness. Left: No mass, tenderness or fullness. Rectum: Normal. No mass, tenderness, anal fissure, external hemorrhoid or internal hemorrhoid. Normal anal tone. Musculoskeletal:         General: Normal range of motion. Cervical back: Normal range of motion. Lymphadenopathy:      Cervical: No cervical adenopathy. Neurological:      Mental Status: She is alert and oriented to person, place, and time.        Vitals: 22 1325   BP: 132/86   Pulse: 84   Weight: 236 lb (107 kg)         ASSESSMENT/PLAN:    postop exam following primary  section  Cutaneous yeast  PLAN: diflucan x3  Lotrisone  Follow-up in 4 weeks      No follow-ups on file. On this date 2022 I have spent 20 minutes reviewing previous notes, test results and face to face with the patient discussing the diagnosis and importance of compliance with the treatment plan as well as documenting on the day of the visit. An electronic signature was used to authenticate this note. Please note this report has been partially produced using speech recognition software and may cause contain errors related to that system including grammar, punctuation and spelling as well as words and phrases that may seem inappropriate. If there are questions or concerns please feel free to contact me to clarify.

## 2023-03-12 PROBLEM — D50.0 IRON DEFICIENCY ANEMIA DUE TO CHRONIC BLOOD LOSS: Status: ACTIVE | Noted: 2023-03-12

## 2023-03-12 PROBLEM — R51.9 HEADACHE DISORDER: Status: ACTIVE | Noted: 2023-03-12

## 2023-03-12 PROBLEM — R11.14 BILIOUS VOMITING WITH NAUSEA: Status: ACTIVE | Noted: 2023-03-12

## 2023-03-12 PROBLEM — R11.0 NAUSEA IN ADULT: Status: ACTIVE | Noted: 2023-03-12

## 2023-03-12 PROBLEM — E66.811 CLASS 1 OBESITY DUE TO EXCESS CALORIES WITH BODY MASS INDEX (BMI) OF 34.0 TO 34.9 IN ADULT: Status: ACTIVE | Noted: 2023-03-12

## 2023-03-12 PROBLEM — F32.A DEPRESSION: Status: ACTIVE | Noted: 2023-03-12

## 2023-03-12 PROBLEM — G43.109 MIGRAINE WITH AURA AND WITHOUT STATUS MIGRAINOSUS, NOT INTRACTABLE: Status: ACTIVE | Noted: 2023-03-12

## 2023-03-12 PROBLEM — E66.09 CLASS 1 OBESITY DUE TO EXCESS CALORIES WITH BODY MASS INDEX (BMI) OF 34.0 TO 34.9 IN ADULT: Status: ACTIVE | Noted: 2023-03-12

## 2023-03-12 PROBLEM — N93.8 DUB (DYSFUNCTIONAL UTERINE BLEEDING): Status: ACTIVE | Noted: 2023-03-12

## 2023-03-12 PROBLEM — R53.83 FATIGUE: Status: ACTIVE | Noted: 2023-03-12

## 2023-03-12 PROBLEM — N39.0 UTI (URINARY TRACT INFECTION): Status: ACTIVE | Noted: 2023-03-12

## 2023-03-12 PROBLEM — N89.8 VAGINAL ITCHING: Status: ACTIVE | Noted: 2023-03-12

## 2023-03-12 PROBLEM — E55.9 VITAMIN D DEFICIENCY: Status: ACTIVE | Noted: 2023-03-12

## 2023-03-12 PROBLEM — K21.9 GERD (GASTROESOPHAGEAL REFLUX DISEASE): Status: ACTIVE | Noted: 2023-03-12

## 2023-03-12 PROBLEM — G44.89 HEADACHE SYNDROME: Status: ACTIVE | Noted: 2023-03-12

## 2023-03-12 PROBLEM — Z86.59 HISTORY OF OCD (OBSESSIVE COMPULSIVE DISORDER): Status: ACTIVE | Noted: 2023-03-12

## 2023-03-12 PROBLEM — R11.2 NAUSEA WITH VOMITING: Status: ACTIVE | Noted: 2023-03-12

## 2023-03-12 PROBLEM — E78.5 HYPERLIPIDEMIA: Status: ACTIVE | Noted: 2023-03-12

## 2023-03-12 RX ORDER — ERGOCALCIFEROL 1.25 MG/1
1 CAPSULE ORAL
COMMUNITY
End: 2023-05-02 | Stop reason: SDUPTHER

## 2023-03-12 RX ORDER — PROPRANOLOL HYDROCHLORIDE 60 MG/1
1 CAPSULE, EXTENDED RELEASE ORAL DAILY
COMMUNITY
End: 2023-07-25 | Stop reason: SDUPTHER

## 2023-03-12 RX ORDER — SUMATRIPTAN SUCCINATE 100 MG/1
1 TABLET ORAL
COMMUNITY
End: 2023-05-02 | Stop reason: ALTCHOICE

## 2023-03-12 RX ORDER — FERROUS SULFATE 325(65) MG
1 TABLET ORAL 3 TIMES DAILY
COMMUNITY
End: 2023-11-02 | Stop reason: ALTCHOICE

## 2023-03-12 RX ORDER — SUMATRIPTAN 20 MG/1
1 SPRAY NASAL AS NEEDED
COMMUNITY
End: 2023-05-02

## 2023-03-12 RX ORDER — ACETAMINOPHEN 500 MG
1 TABLET ORAL DAILY
COMMUNITY
End: 2023-11-02 | Stop reason: SDUPTHER

## 2023-04-17 ENCOUNTER — TELEPHONE (OUTPATIENT)
Dept: PRIMARY CARE | Facility: CLINIC | Age: 23
End: 2023-04-17
Payer: COMMERCIAL

## 2023-04-17 DIAGNOSIS — G43.109 MIGRAINE WITH AURA AND WITHOUT STATUS MIGRAINOSUS, NOT INTRACTABLE: ICD-10-CM

## 2023-04-17 DIAGNOSIS — R51.9 HEADACHE DISORDER: ICD-10-CM

## 2023-04-25 NOTE — PROGRESS NOTES
Subjective   Patient ID: Nancy Jose is a 22 y.o. female who presents for 3 month check up (And follow up labs ).    Hyperlipidemia  This is a chronic problem. The current episode started more than 1 year ago. The problem is controlled. Recent lipid tests were reviewed and are normal. Exacerbating diseases include obesity. Factors aggravating her hyperlipidemia include beta blockers. Pertinent negatives include no myalgias. Current antihyperlipidemic treatment includes diet change and exercise. The current treatment provides moderate improvement of lipids. There are no compliance problems.    Migraine   This is a chronic problem. The current episode started more than 1 year ago. The problem occurs intermittently (2 x wk on avg). The pain is located in the Right unilateral region. The pain radiates to the face. The pain quality is similar to prior headaches. The quality of the pain is described as dull and pulsating. Associated symptoms include blurred vision, eye pain, nausea, phonophobia, photophobia, a visual change and vomiting. Pertinent negatives include no abdominal pain, abnormal behavior, anorexia, back pain, coughing, dizziness, drainage, ear pain, eye redness, facial sweating, fever, hearing loss, insomnia, loss of balance, muscle aches, neck pain, numbness, rhinorrhea, scalp tenderness, seizures, sinus pressure, sore throat, swollen glands, tingling, tinnitus, weakness or weight loss. Associated symptoms comments: Right eye lid drooping. The symptoms are aggravated by bright light, noise, fatigue and emotional stress. She has tried triptans, antidepressants, beta blockers, NSAIDs and Excedrin for the symptoms. The treatment provided mild relief. Her past medical history is significant for migraine headaches, migraines in the family and obesity. There is no history of cancer, cluster headaches, hypertension, immunosuppression, pseudotumor cerebri, recent head traumas, sinus disease or TMJ.     "    Review of Systems   Constitutional:  Negative for activity change, appetite change, chills, fever, unexpected weight change and weight loss.   HENT:  Negative for congestion, ear pain, hearing loss, nosebleeds, postnasal drip, rhinorrhea, sinus pressure, sneezing, sore throat, tinnitus, trouble swallowing and voice change.    Eyes:  Positive for blurred vision, photophobia and pain. Negative for discharge, redness, itching and visual disturbance.   Respiratory:  Negative for cough, choking, chest tightness and wheezing.    Cardiovascular:  Negative for leg swelling.   Gastrointestinal:  Positive for nausea and vomiting. Negative for abdominal distention, abdominal pain, anorexia, blood in stool, constipation and diarrhea.   Endocrine: Negative for cold intolerance, heat intolerance, polydipsia and polyuria.   Genitourinary:  Negative for dysuria, flank pain, frequency, hematuria and urgency.   Musculoskeletal:  Negative for arthralgias, back pain, joint swelling, myalgias, neck pain and neck stiffness.   Skin:  Negative for rash and wound.   Allergic/Immunologic: Negative for immunocompromised state.   Neurological:  Negative for dizziness, tingling, tremors, seizures, facial asymmetry, speech difficulty, weakness, numbness and loss of balance.   Hematological:  Negative for adenopathy. Does not bruise/bleed easily.   Psychiatric/Behavioral:  Negative for agitation, behavioral problems, dysphoric mood, hallucinations, self-injury, sleep disturbance and suicidal ideas. The patient is not nervous/anxious and does not have insomnia.        Objective   /73 (BP Location: Left arm, Patient Position: Sitting, BP Cuff Size: Large adult)   Pulse 95   Temp 36.5 °C (97.7 °F) (Temporal)   Resp 16   Ht 1.727 m (5' 8\")   Wt 110 kg (243 lb)   LMP 03/02/2023   SpO2 98%   BMI 36.95 kg/m²     Physical Exam  Constitutional:       General: She is not in acute distress.     Appearance: She is not ill-appearing or " diaphoretic.   HENT:      Head: Normocephalic and atraumatic.      Right Ear: External ear normal.      Left Ear: External ear normal.      Nose: Nose normal. No rhinorrhea.   Eyes:      General: Lids are normal. No scleral icterus.        Right eye: No discharge.         Left eye: No discharge.      Conjunctiva/sclera: Conjunctivae normal.   Cardiovascular:      Rate and Rhythm: Normal rate and regular rhythm.      Pulses: Normal pulses.      Heart sounds: No murmur heard.  Pulmonary:      Effort: Pulmonary effort is normal. No respiratory distress.      Breath sounds: No decreased breath sounds, wheezing, rhonchi or rales.   Abdominal:      General: Bowel sounds are normal. There is no distension.      Palpations: Abdomen is soft. There is no mass.      Tenderness: There is no abdominal tenderness. There is no guarding or rebound.   Musculoskeletal:         General: No swelling, tenderness or deformity.      Cervical back: No rigidity or tenderness.      Right lower leg: No edema.      Left lower leg: No edema.   Lymphadenopathy:      Cervical: No cervical adenopathy.      Upper Body:      Right upper body: No supraclavicular adenopathy.      Left upper body: No supraclavicular adenopathy.   Skin:     General: Skin is warm and dry.      Coloration: Skin is not jaundiced or pale.      Findings: No erythema, lesion or rash.   Neurological:      General: No focal deficit present.      Mental Status: She is alert and oriented to person, place, and time.      Sensory: No sensory deficit.      Motor: No weakness or tremor.      Coordination: Coordination normal.      Gait: Gait normal.   Psychiatric:         Mood and Affect: Mood normal. Affect is not inappropriate.         Behavior: Behavior normal.         Assessment/Plan   Diagnoses and all orders for this visit:  Vitamin D deficiency  -     ergocalciferol (Vitamin D-2) 1.25 MG (09373 UT) capsule; Take 1 capsule (1,250 mcg) by mouth 1 (one) time per week. For 12  weeks  -     Follow Up In [facility] - PCP; Future  -     Vitamin D, Total; Future  Migraine with aura and without status migrainosus, not intractable  -     galcanezumab (Emgality Syringe) 120 mg/mL prefilled syringe; Inject 1 Syringe (120 mg) under the skin every 28 (twenty-eight) days.  -     Follow Up In [facility] - PCP; Future  Headache disorder  -     Follow Up In [facility] - PCP; Future  Mixed hyperlipidemia  -     Follow Up In [facility] - PCP; Future  -     CBC and Auto Differential; Future  -     Comprehensive Metabolic Panel; Future  -     Lipid Panel; Future  -     Magnesium; Future  -     TSH with reflex to Free T4 if abnormal; Future  Yeast infection  -     fluconazole (Diflucan) 150 mg tablet; Take 1 tablet (150 mg) by mouth 1 time for 1 dose.  Class 2 severe obesity due to excess calories with serious comorbidity and body mass index (BMI) of 36.0 to 36.9 in adult (CMS/[facility])  Depression, unspecified depression type  -     Follow Up In [facility]; Future  Anxiety  -     Follow Up In [facility]; Future

## 2023-04-28 LAB
BASOPHILS (10*3/UL) IN BLOOD BY AUTOMATED COUNT: 0.02 X10E9/L (ref 0–0.1)
BASOPHILS/100 LEUKOCYTES IN BLOOD BY AUTOMATED COUNT: 0.3 % (ref 0–2)
CALCIDIOL (25 OH VITAMIN D3) (NG/ML) IN SER/PLAS: 21 NG/ML
CHOLESTEROL (MG/DL) IN SER/PLAS: 180 MG/DL (ref 0–199)
CHOLESTEROL IN HDL (MG/DL) IN SER/PLAS: 45.4 MG/DL
CHOLESTEROL/HDL RATIO: 4
EOSINOPHILS (10*3/UL) IN BLOOD BY AUTOMATED COUNT: 0.11 X10E9/L (ref 0–0.7)
EOSINOPHILS/100 LEUKOCYTES IN BLOOD BY AUTOMATED COUNT: 1.5 % (ref 0–6)
ERYTHROCYTE DISTRIBUTION WIDTH (RATIO) BY AUTOMATED COUNT: 15.9 % (ref 11.5–14.5)
ERYTHROCYTE MEAN CORPUSCULAR HEMOGLOBIN CONCENTRATION (G/DL) BY AUTOMATED: 31.4 G/DL (ref 32–36)
ERYTHROCYTE MEAN CORPUSCULAR VOLUME (FL) BY AUTOMATED COUNT: 82 FL (ref 80–100)
ERYTHROCYTES (10*6/UL) IN BLOOD BY AUTOMATED COUNT: 4.98 X10E12/L (ref 4–5.2)
HEMATOCRIT (%) IN BLOOD BY AUTOMATED COUNT: 40.8 % (ref 36–46)
HEMOGLOBIN (G/DL) IN BLOOD: 12.8 G/DL (ref 12–16)
IMMATURE GRANULOCYTES/100 LEUKOCYTES IN BLOOD BY AUTOMATED COUNT: 0.3 % (ref 0–0.9)
IRON (UG/DL) IN SER/PLAS: 51 UG/DL (ref 35–150)
IRON BINDING CAPACITY (UG/DL) IN SER/PLAS: 354 UG/DL (ref 240–445)
IRON SATURATION (%) IN SER/PLAS: 14 % (ref 25–45)
LDL: 105 MG/DL (ref 0–119)
LEUKOCYTES (10*3/UL) IN BLOOD BY AUTOMATED COUNT: 7.4 X10E9/L (ref 4.4–11.3)
LYMPHOCYTES (10*3/UL) IN BLOOD BY AUTOMATED COUNT: 2.03 X10E9/L (ref 1.2–4.8)
LYMPHOCYTES/100 LEUKOCYTES IN BLOOD BY AUTOMATED COUNT: 27.5 % (ref 13–44)
MONOCYTES (10*3/UL) IN BLOOD BY AUTOMATED COUNT: 0.51 X10E9/L (ref 0.1–1)
MONOCYTES/100 LEUKOCYTES IN BLOOD BY AUTOMATED COUNT: 6.9 % (ref 2–10)
NEUTROPHILS (10*3/UL) IN BLOOD BY AUTOMATED COUNT: 4.68 X10E9/L (ref 1.2–7.7)
NEUTROPHILS/100 LEUKOCYTES IN BLOOD BY AUTOMATED COUNT: 63.5 % (ref 40–80)
PLATELETS (10*3/UL) IN BLOOD AUTOMATED COUNT: 340 X10E9/L (ref 150–450)
TRIGLYCERIDE (MG/DL) IN SER/PLAS: 150 MG/DL (ref 0–149)
VLDL: 30 MG/DL (ref 0–40)

## 2023-05-02 ENCOUNTER — OFFICE VISIT (OUTPATIENT)
Dept: PRIMARY CARE | Facility: CLINIC | Age: 23
End: 2023-05-02
Payer: COMMERCIAL

## 2023-05-02 VITALS
TEMPERATURE: 97.7 F | SYSTOLIC BLOOD PRESSURE: 109 MMHG | RESPIRATION RATE: 16 BRPM | WEIGHT: 243 LBS | HEART RATE: 95 BPM | HEIGHT: 68 IN | BODY MASS INDEX: 36.83 KG/M2 | OXYGEN SATURATION: 98 % | DIASTOLIC BLOOD PRESSURE: 73 MMHG

## 2023-05-02 DIAGNOSIS — F32.A DEPRESSION, UNSPECIFIED DEPRESSION TYPE: ICD-10-CM

## 2023-05-02 DIAGNOSIS — F41.9 ANXIETY: ICD-10-CM

## 2023-05-02 DIAGNOSIS — R51.9 HEADACHE DISORDER: ICD-10-CM

## 2023-05-02 DIAGNOSIS — E66.01 CLASS 2 SEVERE OBESITY DUE TO EXCESS CALORIES WITH SERIOUS COMORBIDITY AND BODY MASS INDEX (BMI) OF 36.0 TO 36.9 IN ADULT (MULTI): ICD-10-CM

## 2023-05-02 DIAGNOSIS — G43.109 MIGRAINE WITH AURA AND WITHOUT STATUS MIGRAINOSUS, NOT INTRACTABLE: ICD-10-CM

## 2023-05-02 DIAGNOSIS — E78.2 MIXED HYPERLIPIDEMIA: ICD-10-CM

## 2023-05-02 DIAGNOSIS — E55.9 VITAMIN D DEFICIENCY: Primary | ICD-10-CM

## 2023-05-02 DIAGNOSIS — B37.9 YEAST INFECTION: ICD-10-CM

## 2023-05-02 PROBLEM — E66.812 CLASS 2 OBESITY DUE TO EXCESS CALORIES WITH BODY MASS INDEX (BMI) OF 36.0 TO 36.9 IN ADULT: Status: ACTIVE | Noted: 2023-03-12

## 2023-05-02 PROCEDURE — 99214 OFFICE O/P EST MOD 30 MIN: CPT | Performed by: FAMILY MEDICINE

## 2023-05-02 PROCEDURE — 1036F TOBACCO NON-USER: CPT | Performed by: FAMILY MEDICINE

## 2023-05-02 PROCEDURE — 3008F BODY MASS INDEX DOCD: CPT | Performed by: FAMILY MEDICINE

## 2023-05-02 RX ORDER — GALCANEZUMAB 120 MG/ML
120 INJECTION, SOLUTION SUBCUTANEOUS
Qty: 1 EACH | Refills: 11 | Status: SHIPPED | OUTPATIENT
Start: 2023-05-02 | End: 2024-05-03 | Stop reason: SDUPTHER

## 2023-05-02 RX ORDER — FLUCONAZOLE 150 MG/1
150 TABLET ORAL ONCE
Qty: 1 TABLET | Refills: 2 | Status: SHIPPED | OUTPATIENT
Start: 2023-05-02 | End: 2023-05-02

## 2023-05-02 RX ORDER — ERGOCALCIFEROL 1.25 MG/1
1 CAPSULE ORAL
Qty: 12 CAPSULE | Refills: 0 | Status: SHIPPED | OUTPATIENT
Start: 2023-05-02 | End: 2023-11-02 | Stop reason: SDUPTHER

## 2023-05-02 ASSESSMENT — ENCOUNTER SYMPTOMS
DYSPHORIC MOOD: 0
PALPITATIONS: 0
APPETITE CHANGE: 0
NAUSEA: 1
NUMBNESS: 0
COUGH: 0
PHOTOPHOBIA: 1
SORE THROAT: 0
FEVER: 0
FACIAL SWEATING: 0
SINUS PRESSURE: 0
NECK PAIN: 0
SLEEP DISTURBANCE: 0
DIZZINESS: 0
LIGHT-HEADEDNESS: 0
FATIGUE: 1
DIAPHORESIS: 0
EYE PAIN: 1
FOCAL SENSORY LOSS: 0
WEIGHT LOSS: 0
BOWEL INCONTINENCE: 0
HEMATURIA: 0
SWOLLEN GLANDS: 0
CHILLS: 0
VISUAL CHANGE: 0
ABNORMAL BEHAVIOR: 0
NERVOUS/ANXIOUS: 0
BLURRED VISION: 1
WEAKNESS: 0
DYSURIA: 0
AURA: 0
FREQUENCY: 0
FLANK PAIN: 0
BRUISES/BLEEDS EASILY: 0
VERTIGO: 0
BACK PAIN: 0
NEAR-SYNCOPE: 0
EYE REDNESS: 0
ABDOMINAL DISTENTION: 0
ADENOPATHY: 0
UNEXPECTED WEIGHT CHANGE: 0
SHORTNESS OF BREATH: 1
HEADACHES: 1
TREMORS: 0
VISUAL CHANGE: 1
SCALP TENDERNESS: 0
TROUBLE SWALLOWING: 0
VOMITING: 1
MEMORY LOSS: 0
SPEECH DIFFICULTY: 0
RHINORRHEA: 0
ARTHRALGIAS: 0
HALLUCINATIONS: 0
TINGLING: 0
LOSS OF BALANCE: 0
INSOMNIA: 0
MIGRAINE HEADACHES: 1
ACTIVITY CHANGE: 0
CONFUSION: 0
WOUND: 0
CONSTIPATION: 0
EYE ITCHING: 0
CLUMSINESS: 0
ANOREXIA: 0
AGITATION: 0
SEIZURES: 0
ABDOMINAL PAIN: 0
SLURRED SPEECH: 0
JOINT SWELLING: 0
NECK STIFFNESS: 0
POLYDIPSIA: 0
CLUSTER HEADACHES: 0
DIARRHEA: 0
EYE DISCHARGE: 0
VOICE CHANGE: 0
WHEEZING: 0
CHOKING: 0
FACIAL ASYMMETRY: 0
FOCAL WEAKNESS: 0
MYALGIAS: 0
ALTERED MENTAL STATUS: 0
NEUROLOGIC COMPLAINT: 1
BLOOD IN STOOL: 0
CHEST TIGHTNESS: 0

## 2023-05-22 ENCOUNTER — TELEPHONE (OUTPATIENT)
Dept: PRIMARY CARE | Facility: CLINIC | Age: 23
End: 2023-05-22
Payer: COMMERCIAL

## 2023-05-25 ENCOUNTER — TELEPHONE (OUTPATIENT)
Dept: PRIMARY CARE | Facility: CLINIC | Age: 23
End: 2023-05-25
Payer: COMMERCIAL

## 2023-05-25 NOTE — TELEPHONE ENCOUNTER
patient called states she was suppose to have consult with you but has not been contacted please reachout when you can

## 2023-05-30 NOTE — TELEPHONE ENCOUNTER
Phoned patient and offered community resources    Patient verbalized will stop-in the office to pick-up resource contacts - while awaiting [place] call for an appointment.

## 2023-06-13 ENCOUNTER — OFFICE VISIT (OUTPATIENT)
Dept: PRIMARY CARE | Facility: CLINIC | Age: 23
End: 2023-06-13
Payer: COMMERCIAL

## 2023-06-13 VITALS
HEIGHT: 68 IN | SYSTOLIC BLOOD PRESSURE: 106 MMHG | RESPIRATION RATE: 18 BRPM | TEMPERATURE: 97.7 F | BODY MASS INDEX: 36.53 KG/M2 | DIASTOLIC BLOOD PRESSURE: 72 MMHG | HEART RATE: 65 BPM | WEIGHT: 241 LBS | OXYGEN SATURATION: 97 %

## 2023-06-13 DIAGNOSIS — K21.9 CHEST PAIN DUE TO GERD: Primary | ICD-10-CM

## 2023-06-13 DIAGNOSIS — R07.9 CHEST PAIN DUE TO GERD: Primary | ICD-10-CM

## 2023-06-13 PROCEDURE — 99213 OFFICE O/P EST LOW 20 MIN: CPT | Performed by: FAMILY MEDICINE

## 2023-06-13 PROCEDURE — 1036F TOBACCO NON-USER: CPT | Performed by: FAMILY MEDICINE

## 2023-06-13 PROCEDURE — 3008F BODY MASS INDEX DOCD: CPT | Performed by: FAMILY MEDICINE

## 2023-06-13 PROCEDURE — 93000 ELECTROCARDIOGRAM COMPLETE: CPT | Performed by: FAMILY MEDICINE

## 2023-06-13 ASSESSMENT — ENCOUNTER SYMPTOMS
FEVER: 0
COUGH: 0
HEMOPTYSIS: 0
ABDOMINAL PAIN: 0
CLAUDICATION: 0
LOWER EXTREMITY EDEMA: 0
WEAKNESS: 0
SYNCOPE: 0
BACK PAIN: 0
NAUSEA: 0
VOMITING: 0
EXERTIONAL CHEST PRESSURE: 0
NEAR-SYNCOPE: 0
PALPITATIONS: 0
SHORTNESS OF BREATH: 0
SPUTUM PRODUCTION: 0
HEADACHES: 1
NUMBNESS: 0
PND: 0
LEG PAIN: 0
ORTHOPNEA: 0
IRREGULAR HEARTBEAT: 0
DIAPHORESIS: 0
DIZZINESS: 0

## 2023-06-13 NOTE — PROGRESS NOTES
Subjective   Patient ID: Nancy Jose is a 23 y.o. female who presents for Chest Pain (Right side when she take deep breaths x 1 week ).    Chest Pain   This is a new problem. The current episode started in the past 7 days. The onset quality is sudden. The problem occurs rarely. The problem has been resolved. The pain is present in the lateral region and epigastric region. The pain is severe. The quality of the pain is described as sharp. The pain does not radiate. Associated symptoms include headaches. Pertinent negatives include no abdominal pain, back pain, claudication, cough, diaphoresis, dizziness, exertional chest pressure, fever, hemoptysis, irregular heartbeat, leg pain, lower extremity edema, malaise/fatigue, nausea, near-syncope, numbness, orthopnea, palpitations, PND, shortness of breath, sputum production, syncope, vomiting or weakness. The pain is aggravated by deep breathing, lifting and movement. She has tried rest for the symptoms. Improvement on treatment: complete. Risk factors include lack of exercise, obesity and sedentary lifestyle.   Her past medical history is significant for hyperlipidemia.   Her family medical history is significant for CAD, heart disease, hyperlipidemia and hypertension.   Pertinent negatives for family medical history include: no early MI.        Review of Systems   Constitutional:  Negative for diaphoresis, fever and malaise/fatigue.   Respiratory:  Negative for cough, hemoptysis, sputum production and shortness of breath.    Cardiovascular:  Positive for chest pain. Negative for palpitations, orthopnea, claudication, syncope, PND and near-syncope.   Gastrointestinal:  Negative for abdominal pain, nausea and vomiting.   Musculoskeletal:  Negative for back pain.   Neurological:  Positive for headaches. Negative for dizziness, weakness and numbness.       Objective   /72 (BP Location: Right arm, Patient Position: Sitting, BP Cuff Size: Large adult)   Pulse 65   " Temp 36.5 °C (97.7 °F) (Temporal)   Resp 18   Ht 1.727 m (5' 8\")   Wt 109 kg (241 lb)   SpO2 97%   BMI 36.64 kg/m²     Physical Exam  Constitutional:       General: She is not in acute distress.     Appearance: She is not ill-appearing or diaphoretic.   HENT:      Head: Normocephalic and atraumatic.      Right Ear: External ear normal.      Left Ear: External ear normal.      Nose: Nose normal. No rhinorrhea.   Eyes:      General: Lids are normal. No scleral icterus.        Right eye: No discharge.         Left eye: No discharge.      Conjunctiva/sclera: Conjunctivae normal.   Cardiovascular:      Rate and Rhythm: Normal rate and regular rhythm.      Pulses: Normal pulses.      Heart sounds: No murmur heard.  Pulmonary:      Effort: Pulmonary effort is normal. No respiratory distress.      Breath sounds: No decreased breath sounds, wheezing, rhonchi or rales.   Abdominal:      General: Bowel sounds are normal. There is no distension.      Palpations: Abdomen is soft. There is no mass.      Tenderness: There is no abdominal tenderness. There is no guarding or rebound.   Musculoskeletal:         General: No swelling, tenderness or deformity.      Cervical back: No rigidity or tenderness.      Right lower leg: No edema.      Left lower leg: No edema.   Lymphadenopathy:      Cervical: No cervical adenopathy.      Upper Body:      Right upper body: No supraclavicular adenopathy.      Left upper body: No supraclavicular adenopathy.   Skin:     General: Skin is warm and dry.      Coloration: Skin is not jaundiced or pale.      Findings: No erythema, lesion or rash.   Neurological:      General: No focal deficit present.      Mental Status: She is alert and oriented to person, place, and time.      Sensory: No sensory deficit.      Motor: No weakness or tremor.      Coordination: Coordination normal.      Gait: Gait normal.   Psychiatric:         Mood and Affect: Mood normal. Affect is not inappropriate.         " Behavior: Behavior normal.         Assessment/Plan   Diagnoses and all orders for this visit:  Chest pain due to GERD  -     ECG 12 lead  Symptomatology reviewed.  Symptomatology likely secondary to reflux.  EKG reviewed with patient and compared to prior EKG done with similar symptomatology in 2017 and EKG reveals no changes from 2017.  Recommend in the future if she develops repeat symptomatology start OTC Mylanta 30 mL orally 4 times daily as needed.  Discussed option of famotidine or omeprazole but at this point declines due to intermittent nature and would prefer to just utilize something as needed for now.  Recommend keep next regular scheduled follow-up appointment.

## 2023-06-29 ENCOUNTER — TELEPHONE (OUTPATIENT)
Dept: PRIMARY CARE | Facility: CLINIC | Age: 23
End: 2023-06-29
Payer: COMMERCIAL

## 2023-06-29 NOTE — PROGRESS NOTES
Writer outreached pt regarding their referral to [facility]. Explained program and answered pt's questions. Pt requested to schedule future initial assessment. Pt is scheduled for 08/15 @ 1pm in person.

## 2023-07-05 DIAGNOSIS — F32.A DEPRESSION, UNSPECIFIED DEPRESSION TYPE: Primary | ICD-10-CM

## 2023-07-05 RX ORDER — FLUOXETINE 10 MG/1
CAPSULE ORAL
Qty: 30 CAPSULE | Refills: 0 | Status: SHIPPED | OUTPATIENT
Start: 2023-07-05 | End: 2023-07-31

## 2023-07-23 DIAGNOSIS — G43.109 MIGRAINE WITH AURA AND WITHOUT STATUS MIGRAINOSUS, NOT INTRACTABLE: ICD-10-CM

## 2023-07-23 DIAGNOSIS — E55.9 VITAMIN D DEFICIENCY: ICD-10-CM

## 2023-07-24 RX ORDER — ERGOCALCIFEROL 1.25 MG/1
1 CAPSULE ORAL
Qty: 12 CAPSULE | Refills: 0 | OUTPATIENT
Start: 2023-07-24

## 2023-07-25 RX ORDER — PROPRANOLOL HYDROCHLORIDE 60 MG/1
60 CAPSULE, EXTENDED RELEASE ORAL DAILY
Qty: 90 CAPSULE | Refills: 1 | Status: SHIPPED | OUTPATIENT
Start: 2023-07-25 | End: 2023-11-13

## 2023-07-30 DIAGNOSIS — F32.A DEPRESSION, UNSPECIFIED DEPRESSION TYPE: ICD-10-CM

## 2023-07-31 RX ORDER — FLUOXETINE 10 MG/1
CAPSULE ORAL
Qty: 30 CAPSULE | Refills: 0 | Status: SHIPPED | OUTPATIENT
Start: 2023-07-31 | End: 2023-08-01

## 2023-08-01 DIAGNOSIS — F32.A DEPRESSION, UNSPECIFIED DEPRESSION TYPE: ICD-10-CM

## 2023-08-01 RX ORDER — FLUOXETINE 10 MG/1
CAPSULE ORAL
Qty: 30 CAPSULE | Refills: 0 | Status: SHIPPED | OUTPATIENT
Start: 2023-08-01 | End: 2023-08-18 | Stop reason: SINTOL

## 2023-08-09 ENCOUNTER — TELEPHONE (OUTPATIENT)
Dept: PRIMARY CARE | Facility: CLINIC | Age: 23
End: 2023-08-09
Payer: COMMERCIAL

## 2023-08-09 DIAGNOSIS — N92.6 IRREGULAR MENSES: ICD-10-CM

## 2023-08-15 ENCOUNTER — SOCIAL WORK (OUTPATIENT)
Dept: PRIMARY CARE | Facility: CLINIC | Age: 23
End: 2023-08-15
Payer: COMMERCIAL

## 2023-08-15 ASSESSMENT — ANXIETY QUESTIONNAIRES
7. FEELING AFRAID AS IF SOMETHING AWFUL MIGHT HAPPEN: NEARLY EVERY DAY
3. WORRYING TOO MUCH ABOUT DIFFERENT THINGS: NEARLY EVERY DAY
1. FEELING NERVOUS, ANXIOUS, OR ON EDGE: NEARLY EVERY DAY
GAD7 TOTAL SCORE: 17
4. TROUBLE RELAXING: NEARLY EVERY DAY
5. BEING SO RESTLESS THAT IT IS HARD TO SIT STILL: NOT AT ALL
6. BECOMING EASILY ANNOYED OR IRRITABLE: MORE THAN HALF THE DAYS
IF YOU CHECKED OFF ANY PROBLEMS ON THIS QUESTIONNAIRE, HOW DIFFICULT HAVE THESE PROBLEMS MADE IT FOR YOU TO DO YOUR WORK, TAKE CARE OF THINGS AT HOME, OR GET ALONG WITH OTHER PEOPLE: VERY DIFFICULT
2. NOT BEING ABLE TO STOP OR CONTROL WORRYING: NEARLY EVERY DAY

## 2023-08-15 ASSESSMENT — PATIENT HEALTH QUESTIONNAIRE - PHQ9
6. FEELING BAD ABOUT YOURSELF - OR THAT YOU ARE A FAILURE OR HAVE LET YOURSELF OR YOUR FAMILY DOWN: NEARLY EVERY DAY
SUM OF ALL RESPONSES TO PHQ QUESTIONS 1-9: 20
7. TROUBLE CONCENTRATING ON THINGS, SUCH AS READING THE NEWSPAPER OR WATCHING TELEVISION: NEARLY EVERY DAY
SUM OF ALL RESPONSES TO PHQ9 QUESTIONS 1 & 2: 6
2. FEELING DOWN, DEPRESSED OR HOPELESS: NEARLY EVERY DAY
4. FEELING TIRED OR HAVING LITTLE ENERGY: NEARLY EVERY DAY
1. LITTLE INTEREST OR PLEASURE IN DOING THINGS: NEARLY EVERY DAY
10. IF YOU CHECKED OFF ANY PROBLEMS, HOW DIFFICULT HAVE THESE PROBLEMS MADE IT FOR YOU TO DO YOUR WORK, TAKE CARE OF THINGS AT HOME, OR GET ALONG WITH OTHER PEOPLE: VERY DIFFICULT
9. THOUGHTS THAT YOU WOULD BE BETTER OFF DEAD, OR OF HURTING YOURSELF: NOT AT ALL
5. POOR APPETITE OR OVEREATING: SEVERAL DAYS
8. MOVING OR SPEAKING SO SLOWLY THAT OTHER PEOPLE COULD HAVE NOTICED. OR THE OPPOSITE, BEING SO FIGETY OR RESTLESS THAT YOU HAVE BEEN MOVING AROUND A LOT MORE THAN USUAL: NEARLY EVERY DAY
3. TROUBLE FALLING OR STAYING ASLEEP: SEVERAL DAYS
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
## 2023-08-18 ENCOUNTER — TELEPHONE (OUTPATIENT)
Dept: PRIMARY CARE | Facility: CLINIC | Age: 23
End: 2023-08-18
Payer: COMMERCIAL

## 2023-08-18 DIAGNOSIS — F32.A DEPRESSION, UNSPECIFIED DEPRESSION TYPE: Primary | ICD-10-CM

## 2023-08-18 RX ORDER — DULOXETIN HYDROCHLORIDE 20 MG/1
20 CAPSULE, DELAYED RELEASE ORAL DAILY
Qty: 30 CAPSULE | Refills: 1 | Status: SHIPPED | OUTPATIENT
Start: 2023-08-18 | End: 2023-08-21 | Stop reason: ALTCHOICE

## 2023-08-18 NOTE — PROGRESS NOTES
Writer attempted to outreach pt to review psych recs. [facility]. Psych recs via email attached:     From: Mena Saldivar  Sent: Friday, August 18, 2023 8:59:57 AM  To: Rob Parks  Subject: **Confidential**   Hi Dr. Parks,     I met with a patient (F, 22yo) this week for an initial assessment (MRN: [id number]) and she is reporting that since starting her Prozac on 05/02 she has been experiencing a heavy feeling in her chest about a half hour after taking the medication. She shared that it feels as if it's humid outside and it is more challenging to breathe. I wanted to check in with you about this prior to our psych consult on 08/23. Is that a normal side effect of the medication? She also reported a decreased libido and does not notice a change in mood or anxiety since being on the medication. She shared this feeling goes away throughout the day.     Patient Health Questionnaire-9 Score: 20 (8/15/2023 12:59 PM)  JOSE MANUEL-7 Total Score: 17 (8/15/2023 1:00 PM)    Multiple situational stressors in the home, three kids (4y, 2y, 9 months), living with her father due to an eviction.     Please advise, thank you.     Per Dr. Parks:     Sounds like she's not tolerating, and side effects didn't subside, I'd stop the fluoxetine (presume it's at 20mg right now?) and start Cymbalta 20mg daily and see if she tolerates that    Pat    Rob Parks MD, ILA  Chief Medical Officer, [facility]  Professor, Department of Psychiatry  [employer]

## 2023-08-18 NOTE — PROGRESS NOTES
Pt has shared concerning side effects from her fluoxetine 10mg. Reported a heavy chest feeling and difficulty breathing approx 30 mins after taking her medication. After consulting with Dr. Parks, he is suggesting changing her medication to Cymbalta at 20mgs.     Recommendations per Dr. Parks:   Sounds like she's not tolerating, and side effects didn't subside, I'd stop the fluoxetine and start Cymbalta 20mg daily and see if she tolerates that     Please advise,

## 2023-08-21 ENCOUNTER — PATIENT MESSAGE (OUTPATIENT)
Dept: PRIMARY CARE | Facility: CLINIC | Age: 23
End: 2023-08-21
Payer: COMMERCIAL

## 2023-08-21 DIAGNOSIS — F32.A DEPRESSION, UNSPECIFIED DEPRESSION TYPE: ICD-10-CM

## 2023-08-21 RX ORDER — FLUOXETINE 10 MG/1
10 CAPSULE ORAL DAILY
Qty: 30 CAPSULE | Refills: 0 | Status: SHIPPED | OUTPATIENT
Start: 2023-08-21 | End: 2023-09-08 | Stop reason: SDUPTHER

## 2023-08-23 ENCOUNTER — DOCUMENTATION (OUTPATIENT)
Dept: BEHAVIORAL HEALTH | Facility: CLINIC | Age: 23
End: 2023-08-23
Payer: COMMERCIAL
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
## 2023-08-31 ENCOUNTER — DOCUMENTATION (OUTPATIENT)
Dept: PRIMARY CARE | Facility: CLINIC | Age: 23
End: 2023-08-31
Payer: COMMERCIAL

## 2023-08-31 DIAGNOSIS — F33.1 MODERATE EPISODE OF RECURRENT MAJOR DEPRESSIVE DISORDER (MULTI): Primary | ICD-10-CM

## 2023-08-31 PROCEDURE — 99492 1ST PSYC COLLAB CARE MGMT: CPT | Performed by: FAMILY MEDICINE

## 2023-08-31 PROCEDURE — 99494 1ST/SBSQ PSYC COLLAB CARE: CPT | Performed by: FAMILY MEDICINE

## 2023-09-08 ENCOUNTER — SOCIAL WORK (OUTPATIENT)
Dept: PRIMARY CARE | Facility: CLINIC | Age: 23
End: 2023-09-08
Payer: COMMERCIAL

## 2023-09-08 ENCOUNTER — TELEPHONE (OUTPATIENT)
Dept: PRIMARY CARE | Facility: CLINIC | Age: 23
End: 2023-09-08

## 2023-09-08 DIAGNOSIS — F32.A DEPRESSION, UNSPECIFIED DEPRESSION TYPE: ICD-10-CM

## 2023-09-08 RX ORDER — FLUOXETINE HYDROCHLORIDE 20 MG/1
20 CAPSULE ORAL DAILY
Qty: 30 CAPSULE | Refills: 0 | Status: SHIPPED | OUTPATIENT
Start: 2023-09-08 | End: 2023-10-16

## 2023-09-08 NOTE — PROGRESS NOTES
Met with patient in office today, pt is requesting to move forward with Dr. Parks recommendations to increase her medication     Recommendations:  Patient prefers to stick with fluoxetine right now - and minimal concern for health based on side effects, but can increase to 20mg, and titrate by 10mg daily every 4 weeks, until she is at 40mg

## 2023-09-08 NOTE — PROGRESS NOTES
Collaborative Care (CoCM)  Progress Note    Type of Interaction: In Office    Start Time: 2:30pm    End Time: 3:20pm    Appointment: Not Scheduled    Reason for Visit:   Chief Complaint   Patient presents with    Depression    Anxiety      Interval History / Patient Symptoms:     No data recorded    Interventions Provided: [place] Setting, Psychoeducation, Motivational Interviewing, Solution Focused Therapy, Strengths Exploration, Values Exploration, Communication Training, Develop Coping Strategies, Review Progress/Goals Stress Management, Mindfulness, and Treatment Planning    Progress Made: Moderate    Response to Intervention:     Processed anxieties about starting and alternative medication   Reported that her heart pounding stopped, feeling tired more frequently.   Requested to increase her fluoxetine, informed PCP.   Living with Dad and , both have short fuses.   Processed values and ways to communicate needs more assertively with . Wants to have family dinners, ask him to help around the house more  Pt reported running into her mother today, explored her feelings associated with seeing her mother and ways she wishes she would be more present in her life. Pt's mother tends to decline invitations to things.     Plan: PHQ/JOSE MANUEL at next appt, two weeks on increase of medication     Patient Instructions   Pt is scheduled for 09/22 @ 1pm    Follow Up / Next Appointment: Next appointment: 09/22/23

## 2023-09-11 ENCOUNTER — LAB (OUTPATIENT)
Dept: LAB | Facility: LAB | Age: 23
End: 2023-09-11
Payer: COMMERCIAL

## 2023-09-11 DIAGNOSIS — N92.6 IRREGULAR MENSES: ICD-10-CM

## 2023-09-11 LAB — CHORIOGONADOTROPIN (MIU/ML) IN SER/PLAS: <2 MIU/ML

## 2023-09-11 PROCEDURE — 84702 CHORIONIC GONADOTROPIN TEST: CPT

## 2023-09-11 PROCEDURE — 36415 COLL VENOUS BLD VENIPUNCTURE: CPT

## 2023-09-13 DIAGNOSIS — F32.A DEPRESSION, UNSPECIFIED DEPRESSION TYPE: ICD-10-CM

## 2023-09-13 RX ORDER — FLUOXETINE 10 MG/1
CAPSULE ORAL
Qty: 30 CAPSULE | Refills: 0 | OUTPATIENT
Start: 2023-09-13

## 2023-09-22 ENCOUNTER — SOCIAL WORK (OUTPATIENT)
Dept: PRIMARY CARE | Facility: CLINIC | Age: 23
End: 2023-09-22
Payer: COMMERCIAL

## 2023-09-22 NOTE — PROGRESS NOTES
Collaborative Care (CoCM)  Progress Note    Type of Interaction: In Office    Start Time: 1:00pm    End Time: 2:02pm    Appointment: Not Scheduled    Reason for Visit:   Chief Complaint   Patient presents with    Anxiety      Interval History / Patient Symptoms:     Drowsiness     Interventions Provided: [place] Setting, Psychoeducation, Motivational Interviewing, Strengths Exploration, Values Exploration, Review Progress/Goals Stress Management, Mindfulness, and Treatment Planning    Progress Made: Moderate    Response to Intervention:     Feels like a Zombie on the increase of Fluoxetine 20mg  Feels depression and anxiety have improved. -Does believe that she needs to be on medication, but uncertain if this is the correct one for her. Anxiety is primary concern, “It's not really depression itself, it's just the anxiety I get”. Would taking it at night matter?  Four year old, two year old, and 11month old.   Reviewed ways pt spends her day, getting behind on laundry   Has started meal planning, dinners at the table together as family, routine with her ex and childcare, and playing family games.  Continued communication psychoeducation; provided psychoeducation on trauma.   Pt shared it's easier to do things herself opposed to talking with her  about helping her.   Recalled ways her past traumatic experiences have impacted her personality, passive communication style  Encouraged continuing her coping skills such as facundo painting    Plan: PHQ/JOSE MANUEL at next appt. Assess energy levels. Continue BA values work and working on assertive communication skills.     Patient Instructions   Pt is scheduled for in person follow up on 10/10 @ 1pm    Follow Up / Next Appointment: Next appointment: 10/10/23

## 2023-09-28 ENCOUNTER — DOCUMENTATION (OUTPATIENT)
Dept: PRIMARY CARE | Facility: CLINIC | Age: 23
End: 2023-09-28
Payer: COMMERCIAL

## 2023-09-28 DIAGNOSIS — F33.1 MODERATE EPISODE OF RECURRENT MAJOR DEPRESSIVE DISORDER (MULTI): Primary | ICD-10-CM

## 2023-09-28 PROCEDURE — 99494 1ST/SBSQ PSYC COLLAB CARE: CPT | Performed by: FAMILY MEDICINE

## 2023-09-28 PROCEDURE — 99493 SBSQ PSYC COLLAB CARE MGMT: CPT | Performed by: FAMILY MEDICINE

## 2023-10-10 ENCOUNTER — TELEPHONE (OUTPATIENT)
Dept: PRIMARY CARE | Facility: CLINIC | Age: 23
End: 2023-10-10

## 2023-10-16 ENCOUNTER — TELEPHONE (OUTPATIENT)
Dept: PRIMARY CARE | Facility: CLINIC | Age: 23
End: 2023-10-16
Payer: COMMERCIAL

## 2023-10-16 DIAGNOSIS — F32.A DEPRESSION, UNSPECIFIED DEPRESSION TYPE: ICD-10-CM

## 2023-10-16 RX ORDER — FLUOXETINE HYDROCHLORIDE 20 MG/1
20 CAPSULE ORAL DAILY
Qty: 30 CAPSULE | Refills: 0 | Status: SHIPPED | OUTPATIENT
Start: 2023-10-16 | End: 2023-10-19 | Stop reason: SDUPTHER

## 2023-10-16 NOTE — PROGRESS NOTES
Writer attempted to outreach pt regarding r/s session, LVM. Pt may be closed from Collaborative Care without follow up to outreach attempts.

## 2023-10-19 DIAGNOSIS — F32.A DEPRESSION, UNSPECIFIED DEPRESSION TYPE: ICD-10-CM

## 2023-10-19 RX ORDER — FLUOXETINE 10 MG/1
10 CAPSULE ORAL DAILY
Qty: 30 CAPSULE | Refills: 11 | Status: SHIPPED | OUTPATIENT
Start: 2023-10-19 | End: 2024-10-18

## 2023-10-19 RX ORDER — FLUOXETINE 10 MG/1
10 CAPSULE ORAL DAILY
Qty: 30 CAPSULE | Refills: 0 | OUTPATIENT
Start: 2023-10-19

## 2023-10-30 ENCOUNTER — TELEPHONE (OUTPATIENT)
Dept: PRIMARY CARE | Facility: CLINIC | Age: 23
End: 2023-10-30
Payer: COMMERCIAL

## 2023-10-30 NOTE — PROGRESS NOTES
Writer has outreached pt in an attempt to reengaged in Collaborative Care. Due to a lack of response to outreach, pt is being closed from Collaborative Care this time. If additional support needs arise, pt can be re referred to program.

## 2023-10-31 ENCOUNTER — LAB (OUTPATIENT)
Dept: LAB | Facility: LAB | Age: 23
End: 2023-10-31
Payer: COMMERCIAL

## 2023-10-31 DIAGNOSIS — E55.9 VITAMIN D DEFICIENCY: ICD-10-CM

## 2023-10-31 DIAGNOSIS — E78.2 MIXED HYPERLIPIDEMIA: ICD-10-CM

## 2023-10-31 LAB
25(OH)D3 SERPL-MCNC: 29 NG/ML (ref 30–100)
ALBUMIN SERPL BCP-MCNC: 4.4 G/DL (ref 3.4–5)
ALP SERPL-CCNC: 60 U/L (ref 33–110)
ALT SERPL W P-5'-P-CCNC: 14 U/L (ref 7–45)
ANION GAP SERPL CALC-SCNC: 12 MMOL/L (ref 10–20)
AST SERPL W P-5'-P-CCNC: 12 U/L (ref 9–39)
BASOPHILS # BLD AUTO: 0.02 X10*3/UL (ref 0–0.1)
BASOPHILS NFR BLD AUTO: 0.3 %
BILIRUB SERPL-MCNC: 0.4 MG/DL (ref 0–1.2)
BUN SERPL-MCNC: 12 MG/DL (ref 6–23)
CALCIUM SERPL-MCNC: 9.2 MG/DL (ref 8.6–10.3)
CHLORIDE SERPL-SCNC: 100 MMOL/L (ref 98–107)
CHOLEST SERPL-MCNC: 197 MG/DL (ref 0–199)
CHOLESTEROL/HDL RATIO: 4.6
CO2 SERPL-SCNC: 29 MMOL/L (ref 21–32)
CREAT SERPL-MCNC: 0.65 MG/DL (ref 0.5–1.05)
EOSINOPHIL # BLD AUTO: 0.12 X10*3/UL (ref 0–0.7)
EOSINOPHIL NFR BLD AUTO: 1.9 %
ERYTHROCYTE [DISTWIDTH] IN BLOOD BY AUTOMATED COUNT: 13.4 % (ref 11.5–14.5)
GFR SERPL CREATININE-BSD FRML MDRD: >90 ML/MIN/1.73M*2
GLUCOSE SERPL-MCNC: 91 MG/DL (ref 74–99)
HCT VFR BLD AUTO: 40.5 % (ref 36–46)
HDLC SERPL-MCNC: 42.5 MG/DL
HGB BLD-MCNC: 13.1 G/DL (ref 12–16)
IMM GRANULOCYTES # BLD AUTO: 0.01 X10*3/UL (ref 0–0.7)
IMM GRANULOCYTES NFR BLD AUTO: 0.2 % (ref 0–0.9)
LDLC SERPL CALC-MCNC: 123 MG/DL
LYMPHOCYTES # BLD AUTO: 1.68 X10*3/UL (ref 1.2–4.8)
LYMPHOCYTES NFR BLD AUTO: 26.8 %
MAGNESIUM SERPL-MCNC: 1.91 MG/DL (ref 1.6–2.4)
MCH RBC QN AUTO: 27.2 PG (ref 26–34)
MCHC RBC AUTO-ENTMCNC: 32.3 G/DL (ref 32–36)
MCV RBC AUTO: 84 FL (ref 80–100)
MONOCYTES # BLD AUTO: 0.38 X10*3/UL (ref 0.1–1)
MONOCYTES NFR BLD AUTO: 6.1 %
NEUTROPHILS # BLD AUTO: 4.05 X10*3/UL (ref 1.2–7.7)
NEUTROPHILS NFR BLD AUTO: 64.7 %
NON HDL CHOLESTEROL: 155 MG/DL (ref 0–149)
NRBC BLD-RTO: 0 /100 WBCS (ref 0–0)
PLATELET # BLD AUTO: 309 X10*3/UL (ref 150–450)
PMV BLD AUTO: 9.8 FL (ref 7.5–11.5)
POTASSIUM SERPL-SCNC: 4.4 MMOL/L (ref 3.5–5.3)
PROT SERPL-MCNC: 7.4 G/DL (ref 6.4–8.2)
RBC # BLD AUTO: 4.81 X10*6/UL (ref 4–5.2)
SODIUM SERPL-SCNC: 137 MMOL/L (ref 136–145)
TRIGL SERPL-MCNC: 158 MG/DL (ref 0–149)
TSH SERPL-ACNC: 1.43 MIU/L (ref 0.44–3.98)
VLDL: 32 MG/DL (ref 0–40)
WBC # BLD AUTO: 6.3 X10*3/UL (ref 4.4–11.3)

## 2023-10-31 PROCEDURE — 36415 COLL VENOUS BLD VENIPUNCTURE: CPT

## 2023-10-31 PROCEDURE — 80061 LIPID PANEL: CPT

## 2023-10-31 PROCEDURE — 83735 ASSAY OF MAGNESIUM: CPT

## 2023-10-31 PROCEDURE — 80053 COMPREHEN METABOLIC PANEL: CPT

## 2023-10-31 PROCEDURE — 82306 VITAMIN D 25 HYDROXY: CPT

## 2023-10-31 PROCEDURE — 84443 ASSAY THYROID STIM HORMONE: CPT

## 2023-10-31 PROCEDURE — 85025 COMPLETE CBC W/AUTO DIFF WBC: CPT

## 2023-11-02 ENCOUNTER — OFFICE VISIT (OUTPATIENT)
Dept: PRIMARY CARE | Facility: CLINIC | Age: 23
End: 2023-11-02
Payer: COMMERCIAL

## 2023-11-02 VITALS
OXYGEN SATURATION: 99 % | TEMPERATURE: 97.3 F | SYSTOLIC BLOOD PRESSURE: 100 MMHG | DIASTOLIC BLOOD PRESSURE: 80 MMHG | RESPIRATION RATE: 16 BRPM | HEART RATE: 67 BPM | WEIGHT: 248.4 LBS | BODY MASS INDEX: 37.65 KG/M2 | HEIGHT: 68 IN

## 2023-11-02 DIAGNOSIS — Z00.00 ROUTINE GENERAL MEDICAL EXAMINATION AT A HEALTH CARE FACILITY: Primary | ICD-10-CM

## 2023-11-02 DIAGNOSIS — E66.01 CLASS 2 SEVERE OBESITY DUE TO EXCESS CALORIES WITH SERIOUS COMORBIDITY AND BODY MASS INDEX (BMI) OF 37.0 TO 37.9 IN ADULT (MULTI): ICD-10-CM

## 2023-11-02 DIAGNOSIS — G43.109 MIGRAINE WITH AURA AND WITHOUT STATUS MIGRAINOSUS, NOT INTRACTABLE: ICD-10-CM

## 2023-11-02 DIAGNOSIS — E55.9 VITAMIN D DEFICIENCY: ICD-10-CM

## 2023-11-02 DIAGNOSIS — E78.2 MIXED HYPERLIPIDEMIA: ICD-10-CM

## 2023-11-02 PROBLEM — R11.14 BILIOUS VOMITING WITH NAUSEA: Status: RESOLVED | Noted: 2023-03-12 | Resolved: 2023-11-02

## 2023-11-02 PROBLEM — R11.0 NAUSEA IN ADULT: Status: RESOLVED | Noted: 2023-03-12 | Resolved: 2023-11-02

## 2023-11-02 PROBLEM — R53.83 FATIGUE: Status: RESOLVED | Noted: 2023-03-12 | Resolved: 2023-11-02

## 2023-11-02 PROBLEM — N39.0 UTI (URINARY TRACT INFECTION): Status: RESOLVED | Noted: 2023-03-12 | Resolved: 2023-11-02

## 2023-11-02 PROBLEM — R11.2 NAUSEA WITH VOMITING: Status: RESOLVED | Noted: 2023-03-12 | Resolved: 2023-11-02

## 2023-11-02 PROBLEM — D50.0 IRON DEFICIENCY ANEMIA DUE TO CHRONIC BLOOD LOSS: Status: RESOLVED | Noted: 2023-03-12 | Resolved: 2023-11-02

## 2023-11-02 PROBLEM — G44.89 HEADACHE SYNDROME: Status: RESOLVED | Noted: 2023-03-12 | Resolved: 2023-11-02

## 2023-11-02 PROBLEM — N93.8 DUB (DYSFUNCTIONAL UTERINE BLEEDING): Status: RESOLVED | Noted: 2023-03-12 | Resolved: 2023-11-02

## 2023-11-02 PROBLEM — N89.8 VAGINAL ITCHING: Status: RESOLVED | Noted: 2023-03-12 | Resolved: 2023-11-02

## 2023-11-02 PROCEDURE — 99395 PREV VISIT EST AGE 18-39: CPT | Performed by: FAMILY MEDICINE

## 2023-11-02 PROCEDURE — 3008F BODY MASS INDEX DOCD: CPT | Performed by: FAMILY MEDICINE

## 2023-11-02 PROCEDURE — 1036F TOBACCO NON-USER: CPT | Performed by: FAMILY MEDICINE

## 2023-11-02 RX ORDER — METOCLOPRAMIDE 10 MG/1
10 TABLET ORAL 4 TIMES DAILY PRN
Qty: 30 TABLET | Refills: 0
Start: 2023-11-02 | End: 2024-01-01

## 2023-11-02 RX ORDER — ERGOCALCIFEROL 1.25 MG/1
1 CAPSULE ORAL
Qty: 12 CAPSULE | Refills: 0 | Status: SHIPPED | OUTPATIENT
Start: 2023-11-02

## 2023-11-02 RX ORDER — ACETAMINOPHEN 500 MG
2000 TABLET ORAL DAILY
Qty: 30 CAPSULE | Refills: 11 | Status: SHIPPED | OUTPATIENT
Start: 2023-11-02 | End: 2024-11-01

## 2023-11-02 ASSESSMENT — ENCOUNTER SYMPTOMS
EYE ITCHING: 0
NECK PAIN: 0
WOUND: 0
PHOTOPHOBIA: 0
CONSTIPATION: 0
NERVOUS/ANXIOUS: 0
VOMITING: 0
VOICE CHANGE: 0
JOINT SWELLING: 0
LIGHT-HEADEDNESS: 0
ABDOMINAL DISTENTION: 0
DYSURIA: 0
DIAPHORESIS: 0
EYE DISCHARGE: 0
DYSPHORIC MOOD: 0
ACTIVITY CHANGE: 0
PALPITATIONS: 0
SORE THROAT: 0
EYE PAIN: 0
CHILLS: 0
WHEEZING: 0
MYALGIAS: 0
FACIAL ASYMMETRY: 0
DIZZINESS: 0
NAUSEA: 0
HEADACHES: 0
TREMORS: 0
ARTHRALGIAS: 0
WEAKNESS: 0
SPEECH DIFFICULTY: 0
ABDOMINAL PAIN: 0
BACK PAIN: 0
BRUISES/BLEEDS EASILY: 0
FREQUENCY: 0
COUGH: 0
FATIGUE: 0
ADENOPATHY: 0
NECK STIFFNESS: 0
FLANK PAIN: 0
FEVER: 0
UNEXPECTED WEIGHT CHANGE: 0
SEIZURES: 0
AGITATION: 0
HALLUCINATIONS: 0
RHINORRHEA: 0
CHOKING: 0
CONFUSION: 0
EYE REDNESS: 0
SHORTNESS OF BREATH: 0
POLYDIPSIA: 0
HEMATURIA: 0
NUMBNESS: 0
CHEST TIGHTNESS: 0
TROUBLE SWALLOWING: 0
DIARRHEA: 0
APPETITE CHANGE: 0
SINUS PRESSURE: 0
BLOOD IN STOOL: 0
SLEEP DISTURBANCE: 0

## 2023-11-02 ASSESSMENT — PATIENT HEALTH QUESTIONNAIRE - PHQ9
SUM OF ALL RESPONSES TO PHQ9 QUESTIONS 1 AND 2: 0
1. LITTLE INTEREST OR PLEASURE IN DOING THINGS: NOT AT ALL
2. FEELING DOWN, DEPRESSED OR HOPELESS: NOT AT ALL

## 2023-11-02 ASSESSMENT — ANXIETY QUESTIONNAIRES
6. BECOMING EASILY ANNOYED OR IRRITABLE: NOT AT ALL
2. NOT BEING ABLE TO STOP OR CONTROL WORRYING: NOT AT ALL
5. BEING SO RESTLESS THAT IT IS HARD TO SIT STILL: NOT AT ALL
3. WORRYING TOO MUCH ABOUT DIFFERENT THINGS: NOT AT ALL
1. FEELING NERVOUS, ANXIOUS, OR ON EDGE: SEVERAL DAYS
4. TROUBLE RELAXING: NOT AT ALL
IF YOU CHECKED OFF ANY PROBLEMS ON THIS QUESTIONNAIRE, HOW DIFFICULT HAVE THESE PROBLEMS MADE IT FOR YOU TO DO YOUR WORK, TAKE CARE OF THINGS AT HOME, OR GET ALONG WITH OTHER PEOPLE: NOT DIFFICULT AT ALL
7. FEELING AFRAID AS IF SOMETHING AWFUL MIGHT HAPPEN: NOT AT ALL
GAD7 TOTAL SCORE: 1

## 2023-11-02 NOTE — PROGRESS NOTES
Subjective   Patient ID: Nancy Jose is a 23 y.o. female who presents for Annual Exam (And review labs ), Chest Pain (Left side chest pain comes and goes. When pt takes a deep breath in and exhales hears a popping sound then pain goes away. ), and Mass (Pt has a lump on her back/neck. Pt doesn't know if its with her weight gain. ).    Subjective  Nancy Jose is a 23 y.o. female and is here for a comprehensive physical exam. The patient reports see below.    Do you take any herbs or supplements that were not prescribed by a doctor? Yes biotin  Are you taking calcium supplements? no  Are you taking aspirin daily? no      History:  LMP: 10/6/23  Last pap date:   Abnormal pap? no  : 5  Para: 3           Review of Systems   Constitutional:  Negative for activity change, appetite change, chills, diaphoresis, fatigue, fever and unexpected weight change.   HENT:  Negative for congestion, ear pain, hearing loss, nosebleeds, postnasal drip, rhinorrhea, sinus pressure, sneezing, sore throat, tinnitus, trouble swallowing and voice change.    Eyes:  Negative for photophobia, pain, discharge, redness, itching and visual disturbance.   Respiratory:  Negative for cough, choking, chest tightness, shortness of breath and wheezing.    Cardiovascular:  Negative for chest pain, palpitations and leg swelling.   Gastrointestinal:  Negative for abdominal distention, abdominal pain, blood in stool, constipation, diarrhea, nausea and vomiting.   Endocrine: Negative for cold intolerance, heat intolerance, polydipsia and polyuria.   Genitourinary:  Negative for dysuria, flank pain, frequency, hematuria and urgency.   Musculoskeletal:  Negative for arthralgias, back pain, joint swelling, myalgias, neck pain and neck stiffness.   Skin:  Negative for rash and wound.   Allergic/Immunologic: Negative for immunocompromised state.   Neurological:  Negative for dizziness, tremors, seizures, syncope, facial asymmetry, speech  "difficulty, weakness, light-headedness, numbness and headaches.   Hematological:  Negative for adenopathy. Does not bruise/bleed easily.   Psychiatric/Behavioral:  Negative for agitation, behavioral problems, confusion, dysphoric mood, hallucinations, self-injury, sleep disturbance and suicidal ideas. The patient is not nervous/anxious.        Objective   /80 (BP Location: Left arm, Patient Position: Sitting, BP Cuff Size: Large adult)   Pulse 67   Temp 36.3 °C (97.3 °F) (Temporal)   Resp 16   Ht 1.727 m (5' 8\")   Wt 113 kg (248 lb 6.4 oz)   SpO2 99%   BMI 37.77 kg/m²     Physical Exam  Constitutional:       General: She is not in acute distress.     Appearance: She is not ill-appearing or diaphoretic.   HENT:      Head: Normocephalic and atraumatic.      Right Ear: External ear normal.      Left Ear: External ear normal.      Nose: Nose normal. No rhinorrhea.   Eyes:      General: Lids are normal. No scleral icterus.        Right eye: No discharge.         Left eye: No discharge.      Conjunctiva/sclera: Conjunctivae normal.   Cardiovascular:      Rate and Rhythm: Normal rate and regular rhythm.      Pulses: Normal pulses.      Heart sounds: No murmur heard.  Pulmonary:      Effort: Pulmonary effort is normal. No respiratory distress.      Breath sounds: No decreased breath sounds, wheezing, rhonchi or rales.   Abdominal:      General: Bowel sounds are normal. There is no distension.      Palpations: Abdomen is soft. There is no mass.      Tenderness: There is no abdominal tenderness. There is no guarding or rebound.   Musculoskeletal:         General: No swelling, tenderness or deformity.      Cervical back: No rigidity or tenderness.      Right lower leg: No edema.      Left lower leg: No edema.   Lymphadenopathy:      Cervical: No cervical adenopathy.      Upper Body:      Right upper body: No supraclavicular adenopathy.      Left upper body: No supraclavicular adenopathy.   Skin:     General: Skin " is warm and dry.      Coloration: Skin is not jaundiced or pale.      Findings: No erythema, lesion or rash.   Neurological:      General: No focal deficit present.      Mental Status: She is alert and oriented to person, place, and time.      Sensory: No sensory deficit.      Motor: No weakness or tremor.      Coordination: Coordination normal.      Gait: Gait normal.   Psychiatric:         Mood and Affect: Mood normal. Affect is not inappropriate.         Behavior: Behavior normal.         Assessment/Plan   Diagnoses and all orders for this visit:  Routine general medical examination at a health care facility  Vitamin D deficiency  -     Follow Up In [facility] - PCP  -     ergocalciferol (Vitamin D-2) 1.25 MG (11885 UT) capsule; Take 1 capsule (1,250 mcg) by mouth 1 (one) time per week. For 12 weeks  -     cholecalciferol (Vitamin D-3) 50 mcg (2,000 unit) capsule; Take 1 capsule (2,000 Units) by mouth once daily.  -     Vitamin D 25-Hydroxy,Total (for eval of Vitamin D levels); Future  -     Comprehensive Metabolic Panel; Future  -     Magnesium; Future  Migraine with aura and without status migrainosus, not intractable  -     Follow Up In [facility] - PCP  -     CBC and Auto Differential; Future  -     Comprehensive Metabolic Panel; Future  -     Magnesium; Future  -     metoclopramide (Reglan) 10 mg tablet; Take 1 tablet (10 mg) by mouth 4 times a day as needed (nausea).  -     Iron and TIBC; Future  Mixed hyperlipidemia  -     Follow Up In [facility] - PCP  -     Comprehensive Metabolic Panel; Future  -     Lipid Panel; Future  -     TSH with reflex to Free T4 if abnormal; Future  Class 2 severe obesity due to excess calories with serious comorbidity and body mass index (BMI) of 37.0 to 37.9 in adult (CMS/[facility])  -     Iron and TIBC; Future    2. Patient Counseling:  --Nutrition: Stressed importance of moderation in sodium/caffeine intake, saturated fat and cholesterol, caloric  balance, sufficient intake of fresh fruits, vegetables, fiber, calcium, iron.  --Exercise: Stressed the importance of regular exercise.   --Substance Abuse: Discussed cessation/primary prevention of tobacco, alcohol, or other drug use; driving or other dangerous activities under the influence; availability of treatment for abuse.   --Injury prevention: Discussed safety belts, safety helmets, smoke detector, smoking near bedding or upholstery.   --Dental health: Discussed importance of regular tooth brushing, flossing, and dental visits.  --Immunizations reviewed.  3. Discussed the patient's BMI with her.  The BMI is above average. The patient received Current weight: 113 kg (248 lb 6.4 oz)  Weight change since last visit (-) denotes wt loss 7.4 lbs   Weight loss needed to achieve BMI 25: 84.3 Lbs  Weight loss needed to achieve BMI 30: 51.5 Lbs    Provided instructions on dietary changes  Provided instructions on exercise  Advised to Increase physical activity because they have an above normal BMI.  4. Follow up 6 mos w/ labs

## 2023-11-03 ENCOUNTER — TELEPHONE (OUTPATIENT)
Dept: PRIMARY CARE | Facility: CLINIC | Age: 23
End: 2023-11-03
Payer: COMMERCIAL

## 2023-11-03 NOTE — PROGRESS NOTES
Pt's CoCM episode was closed due to lack of response to outreach. Writer received request to outreach pt to r/s. [facility] for pt today. CoCM episode will not be reopened until pt schedules and attends next session.

## 2023-11-12 DIAGNOSIS — G43.109 MIGRAINE WITH AURA AND WITHOUT STATUS MIGRAINOSUS, NOT INTRACTABLE: ICD-10-CM

## 2023-11-13 RX ORDER — PROPRANOLOL HYDROCHLORIDE 60 MG/1
60 CAPSULE, EXTENDED RELEASE ORAL DAILY
Qty: 90 CAPSULE | Refills: 0 | Status: SHIPPED | OUTPATIENT
Start: 2023-11-13 | End: 2024-04-10

## 2024-03-22 ENCOUNTER — OFFICE VISIT (OUTPATIENT)
Dept: PRIMARY CARE | Facility: CLINIC | Age: 24
End: 2024-03-22
Payer: COMMERCIAL

## 2024-03-22 VITALS
HEIGHT: 68 IN | HEART RATE: 60 BPM | BODY MASS INDEX: 37.74 KG/M2 | DIASTOLIC BLOOD PRESSURE: 64 MMHG | WEIGHT: 249 LBS | RESPIRATION RATE: 16 BRPM | SYSTOLIC BLOOD PRESSURE: 102 MMHG | TEMPERATURE: 96.6 F | OXYGEN SATURATION: 99 %

## 2024-03-22 DIAGNOSIS — E66.01 CLASS 2 SEVERE OBESITY DUE TO EXCESS CALORIES WITH SERIOUS COMORBIDITY AND BODY MASS INDEX (BMI) OF 37.0 TO 37.9 IN ADULT (MULTI): ICD-10-CM

## 2024-03-22 DIAGNOSIS — Z00.00 ROUTINE GENERAL MEDICAL EXAMINATION AT A HEALTH CARE FACILITY: Primary | ICD-10-CM

## 2024-03-22 DIAGNOSIS — E78.2 MIXED HYPERLIPIDEMIA: ICD-10-CM

## 2024-03-22 DIAGNOSIS — R07.89 RIGHT-SIDED CHEST WALL PAIN: ICD-10-CM

## 2024-03-22 DIAGNOSIS — Z23 ENCOUNTER FOR IMMUNIZATION: ICD-10-CM

## 2024-03-22 DIAGNOSIS — E55.9 VITAMIN D DEFICIENCY: ICD-10-CM

## 2024-03-22 PROBLEM — E78.5 HYPERLIPIDEMIA: Status: RESOLVED | Noted: 2023-03-12 | Resolved: 2024-03-22

## 2024-03-22 PROCEDURE — 3008F BODY MASS INDEX DOCD: CPT | Performed by: FAMILY MEDICINE

## 2024-03-22 PROCEDURE — 93000 ELECTROCARDIOGRAM COMPLETE: CPT | Performed by: FAMILY MEDICINE

## 2024-03-22 PROCEDURE — 1036F TOBACCO NON-USER: CPT | Performed by: FAMILY MEDICINE

## 2024-03-22 PROCEDURE — 99395 PREV VISIT EST AGE 18-39: CPT | Performed by: FAMILY MEDICINE

## 2024-03-22 ASSESSMENT — ENCOUNTER SYMPTOMS
BACK PAIN: 0
FEVER: 0
TREMORS: 0
EYE DISCHARGE: 0
NECK STIFFNESS: 0
VOMITING: 0
JOINT SWELLING: 0
EYE REDNESS: 0
DYSURIA: 0
DYSPHORIC MOOD: 0
RHINORRHEA: 0
TROUBLE SWALLOWING: 0
SHORTNESS OF BREATH: 0
APPETITE CHANGE: 0
HEMATURIA: 0
WOUND: 0
MYALGIAS: 0
NECK PAIN: 0
EYE PAIN: 0
ADENOPATHY: 0
CHILLS: 0
DIZZINESS: 0
NERVOUS/ANXIOUS: 0
CHEST TIGHTNESS: 0
COUGH: 0
SINUS PRESSURE: 0
FLANK PAIN: 0
FATIGUE: 1
DIARRHEA: 0
CHOKING: 0
CONSTIPATION: 0
AGITATION: 0
LIGHT-HEADEDNESS: 0
VOICE CHANGE: 0
DIAPHORESIS: 0
NUMBNESS: 0
WEAKNESS: 0
FREQUENCY: 0
SPEECH DIFFICULTY: 0
EYE ITCHING: 0
SEIZURES: 0
UNEXPECTED WEIGHT CHANGE: 0
BLOOD IN STOOL: 0
BRUISES/BLEEDS EASILY: 0
HEADACHES: 0
ARTHRALGIAS: 0
HALLUCINATIONS: 0
SLEEP DISTURBANCE: 0
ACTIVITY CHANGE: 0
NAUSEA: 0
PALPITATIONS: 0
FACIAL ASYMMETRY: 0
ABDOMINAL DISTENTION: 0
ABDOMINAL PAIN: 0
SORE THROAT: 0
WHEEZING: 0
CONFUSION: 0
PHOTOPHOBIA: 0
POLYDIPSIA: 0

## 2024-03-22 NOTE — PROGRESS NOTES
"Subjective   Patient ID: Nancy Jose is a 23 y.o. female who presents for Chest Pain (Right side. Comes and goes.) and Fatigue.    Subjective  Nancy Jose is a 23 y.o. female and is here for a comprehensive physical exam. The patient reports fatigue, right sided upper chest discomfort intermittent generally last for few minutes and better with stretching after feeling a \"pop \"denies any issues with nausea, vomiting, diarrhea, fever, chills, shortness of breath or pressure type chest pain.    Do you take any herbs or supplements that were not prescribed by a doctor? no  Are you taking calcium supplements? no  Are you taking aspirin daily? no      History:  LMP: No LMP recorded.  Last pap date:   Abnormal pap? no  : 5  Para: 3             Review of Systems   Constitutional:  Positive for fatigue. Negative for activity change, appetite change, chills, diaphoresis, fever and unexpected weight change.   HENT:  Negative for congestion, ear pain, hearing loss, nosebleeds, postnasal drip, rhinorrhea, sinus pressure, sneezing, sore throat, tinnitus, trouble swallowing and voice change.    Eyes:  Negative for photophobia, pain, discharge, redness, itching and visual disturbance.   Respiratory:  Negative for cough, choking, chest tightness, shortness of breath and wheezing.    Cardiovascular:  Negative for chest pain, palpitations and leg swelling.   Gastrointestinal:  Negative for abdominal distention, abdominal pain, blood in stool, constipation, diarrhea, nausea and vomiting.   Endocrine: Negative for cold intolerance, heat intolerance, polydipsia and polyuria.   Genitourinary:  Negative for dysuria, flank pain, frequency, hematuria and urgency.   Musculoskeletal:  Negative for arthralgias, back pain, joint swelling, myalgias, neck pain and neck stiffness.   Skin:  Negative for rash and wound.   Allergic/Immunologic: Negative for immunocompromised state.   Neurological:  Negative for dizziness, " "tremors, seizures, syncope, facial asymmetry, speech difficulty, weakness, light-headedness, numbness and headaches.   Hematological:  Negative for adenopathy. Does not bruise/bleed easily.   Psychiatric/Behavioral:  Negative for agitation, behavioral problems, confusion, dysphoric mood, hallucinations, self-injury, sleep disturbance and suicidal ideas. The patient is not nervous/anxious.        Objective   /64 (BP Location: Right arm, Patient Position: Sitting, BP Cuff Size: Adult)   Pulse 60   Temp 35.9 °C (96.6 °F) (Temporal)   Resp 16   Ht 1.727 m (5' 8\")   Wt 113 kg (249 lb)   SpO2 99%   BMI 37.86 kg/m²     Physical Exam  Constitutional:       General: She is not in acute distress.     Appearance: She is not ill-appearing or diaphoretic.   HENT:      Head: Normocephalic and atraumatic.      Right Ear: External ear normal.      Left Ear: External ear normal.      Nose: Nose normal. No rhinorrhea.   Eyes:      General: Lids are normal. No scleral icterus.        Right eye: No discharge.         Left eye: No discharge.      Conjunctiva/sclera: Conjunctivae normal.   Cardiovascular:      Rate and Rhythm: Normal rate and regular rhythm.      Pulses: Normal pulses.      Heart sounds: No murmur heard.  Pulmonary:      Effort: Pulmonary effort is normal. No respiratory distress.      Breath sounds: No decreased breath sounds, wheezing, rhonchi or rales.   Abdominal:      General: Bowel sounds are normal. There is no distension.      Palpations: Abdomen is soft. There is no mass.      Tenderness: There is no abdominal tenderness. There is no guarding or rebound.   Musculoskeletal:         General: No swelling, tenderness or deformity.      Cervical back: No rigidity or tenderness.      Right lower leg: No edema.      Left lower leg: No edema.   Lymphadenopathy:      Cervical: No cervical adenopathy.      Upper Body:      Right upper body: No supraclavicular adenopathy.      Left upper body: No " supraclavicular adenopathy.   Skin:     General: Skin is warm and dry.      Coloration: Skin is not jaundiced or pale.      Findings: No erythema, lesion or rash.   Neurological:      General: No focal deficit present.      Mental Status: She is alert and oriented to person, place, and time.      Sensory: No sensory deficit.      Motor: No weakness or tremor.      Coordination: Coordination normal.      Gait: Gait normal.   Psychiatric:         Mood and Affect: Mood normal. Affect is not inappropriate.         Behavior: Behavior normal.         Assessment/Plan   Diagnoses and all orders for this visit:  Routine general medical examination at a health care facility  -     Follow Up In [facility] - PCP - Established; Future  Encounter for immunization  -     diphth,pertus,acell,,tetanus (BoostRIX) 2.5-8-5 Lf-mcg-Lf/0.5mL injection; Inject 0.5 mL into the muscle 1 time for 1 dose.  -     Follow Up In [facility] - PCP - Established; Future  Class 2 severe obesity due to excess calories with serious comorbidity and body mass index (BMI) of 37.0 to 37.9 in adult (CMS/[facility])  -     Follow Up In [facility] - PCP - Established; Future  Mixed hyperlipidemia  -     Comprehensive Metabolic Panel; Future  -     Lipid Panel; Future  -     Follow Up [facility] - PCP - Established; Future  Vitamin D deficiency  -     Vitamin D 25-Hydroxy,Total (for eval of Vitamin D levels); Future  -     Follow Up In [facility] - PCP - Established; Future  Right-sided chest wall pain  -     ECG 12 Lead  -     Follow Up In [facility] - PCP - Established; Future  2. Patient Counseling:  --Nutrition: Stressed importance of moderation in sodium/caffeine intake, saturated fat and cholesterol, caloric balance, sufficient intake of fresh fruits, vegetables, fiber, calcium, iron, and 1 mg of folate supplement per day (for females capable of pregnancy).  --Exercise: Stressed the importance  of regular exercise.   --Substance Abuse: Discussed cessation/primary prevention of tobacco, alcohol, or other drug use; driving or other dangerous activities under the influence; availability of treatment for abuse.   --Injury prevention: Discussed safety belts, safety helmets, smoke detector, smoking near bedding or upholstery.   --Dental health: Discussed importance of regular tooth brushing, flossing, and dental visits.  --Immunizations reviewed.  3. Discussed the patient's BMI with her.  The BMI is above average. The patient received Current weight: 113 kg (249 lb)  Weight change since last visit (-) denotes wt loss 0.6 lbs   Weight loss needed to achieve BMI 25: 84.9 Lbs  Weight loss needed to achieve BMI 30: 52.1 Lbs    Provided instructions on dietary changes  Provided instructions on exercise  Advised to Increase physical activity because they have an above normal BMI.  4. Follow up 6 mos wlabs

## 2024-04-09 DIAGNOSIS — G43.109 MIGRAINE WITH AURA AND WITHOUT STATUS MIGRAINOSUS, NOT INTRACTABLE: ICD-10-CM

## 2024-04-10 RX ORDER — PROPRANOLOL HYDROCHLORIDE 60 MG/1
60 CAPSULE, EXTENDED RELEASE ORAL DAILY
Qty: 90 CAPSULE | Refills: 0 | Status: SHIPPED | OUTPATIENT
Start: 2024-04-10

## 2024-05-02 ENCOUNTER — APPOINTMENT (OUTPATIENT)
Dept: PRIMARY CARE | Facility: CLINIC | Age: 24
End: 2024-05-02
Payer: COMMERCIAL

## 2024-05-03 DIAGNOSIS — G43.109 MIGRAINE WITH AURA AND WITHOUT STATUS MIGRAINOSUS, NOT INTRACTABLE: ICD-10-CM

## 2024-05-03 RX ORDER — GALCANEZUMAB 120 MG/ML
120 INJECTION, SOLUTION SUBCUTANEOUS
Qty: 1 EACH | Refills: 5 | Status: SHIPPED | OUTPATIENT
Start: 2024-05-03 | End: 2024-05-06 | Stop reason: SDUPTHER

## 2024-05-06 ENCOUNTER — TELEPHONE (OUTPATIENT)
Dept: PRIMARY CARE | Facility: CLINIC | Age: 24
End: 2024-05-06
Payer: COMMERCIAL

## 2024-05-06 DIAGNOSIS — G43.109 MIGRAINE WITH AURA AND WITHOUT STATUS MIGRAINOSUS, NOT INTRACTABLE: ICD-10-CM

## 2024-05-06 RX ORDER — GALCANEZUMAB 120 MG/ML
120 INJECTION, SOLUTION SUBCUTANEOUS
Qty: 1 EACH | Refills: 5 | Status: SHIPPED | OUTPATIENT
Start: 2024-05-06

## 2024-05-29 ENCOUNTER — LAB (OUTPATIENT)
Dept: LAB | Facility: LAB | Age: 24
End: 2024-05-29
Payer: COMMERCIAL

## 2024-05-29 DIAGNOSIS — E78.2 MIXED HYPERLIPIDEMIA: ICD-10-CM

## 2024-05-29 DIAGNOSIS — G43.109 MIGRAINE WITH AURA AND WITHOUT STATUS MIGRAINOSUS, NOT INTRACTABLE: ICD-10-CM

## 2024-05-29 DIAGNOSIS — E55.9 VITAMIN D DEFICIENCY: ICD-10-CM

## 2024-05-29 DIAGNOSIS — E66.01 CLASS 2 SEVERE OBESITY DUE TO EXCESS CALORIES WITH SERIOUS COMORBIDITY AND BODY MASS INDEX (BMI) OF 37.0 TO 37.9 IN ADULT (MULTI): ICD-10-CM

## 2024-05-29 LAB
25(OH)D3 SERPL-MCNC: 23 NG/ML (ref 30–100)
ALBUMIN SERPL BCP-MCNC: 4.2 G/DL (ref 3.4–5)
ALP SERPL-CCNC: 69 U/L (ref 33–110)
ALT SERPL W P-5'-P-CCNC: 12 U/L (ref 7–45)
ANION GAP SERPL CALC-SCNC: 12 MMOL/L (ref 10–20)
AST SERPL W P-5'-P-CCNC: 13 U/L (ref 9–39)
BASOPHILS # BLD AUTO: 0.03 X10*3/UL (ref 0–0.1)
BASOPHILS NFR BLD AUTO: 0.5 %
BILIRUB SERPL-MCNC: 0.3 MG/DL (ref 0–1.2)
BUN SERPL-MCNC: 14 MG/DL (ref 6–23)
CALCIUM SERPL-MCNC: 9 MG/DL (ref 8.6–10.3)
CHLORIDE SERPL-SCNC: 104 MMOL/L (ref 98–107)
CHOLEST SERPL-MCNC: 186 MG/DL (ref 0–199)
CHOLESTEROL/HDL RATIO: 4.5
CO2 SERPL-SCNC: 26 MMOL/L (ref 21–32)
CREAT SERPL-MCNC: 0.8 MG/DL (ref 0.5–1.05)
EGFRCR SERPLBLD CKD-EPI 2021: >90 ML/MIN/1.73M*2
EOSINOPHIL # BLD AUTO: 0.11 X10*3/UL (ref 0–0.7)
EOSINOPHIL NFR BLD AUTO: 1.7 %
ERYTHROCYTE [DISTWIDTH] IN BLOOD BY AUTOMATED COUNT: 13 % (ref 11.5–14.5)
GLUCOSE SERPL-MCNC: 83 MG/DL (ref 74–99)
HCT VFR BLD AUTO: 38.9 % (ref 36–46)
HDLC SERPL-MCNC: 41.7 MG/DL
HGB BLD-MCNC: 12.4 G/DL (ref 12–16)
IMM GRANULOCYTES # BLD AUTO: 0.02 X10*3/UL (ref 0–0.7)
IMM GRANULOCYTES NFR BLD AUTO: 0.3 % (ref 0–0.9)
IRON SATN MFR SERPL: 17 % (ref 25–45)
IRON SERPL-MCNC: 58 UG/DL (ref 35–150)
LDLC SERPL CALC-MCNC: 107 MG/DL
LYMPHOCYTES # BLD AUTO: 2.36 X10*3/UL (ref 1.2–4.8)
LYMPHOCYTES NFR BLD AUTO: 35.5 %
MAGNESIUM SERPL-MCNC: 2.07 MG/DL (ref 1.6–2.4)
MCH RBC QN AUTO: 26.3 PG (ref 26–34)
MCHC RBC AUTO-ENTMCNC: 31.9 G/DL (ref 32–36)
MCV RBC AUTO: 83 FL (ref 80–100)
MONOCYTES # BLD AUTO: 0.48 X10*3/UL (ref 0.1–1)
MONOCYTES NFR BLD AUTO: 7.2 %
NEUTROPHILS # BLD AUTO: 3.64 X10*3/UL (ref 1.2–7.7)
NEUTROPHILS NFR BLD AUTO: 54.8 %
NON HDL CHOLESTEROL: 144 MG/DL (ref 0–149)
NRBC BLD-RTO: 0 /100 WBCS (ref 0–0)
PLATELET # BLD AUTO: 387 X10*3/UL (ref 150–450)
POTASSIUM SERPL-SCNC: 4.5 MMOL/L (ref 3.5–5.3)
PROT SERPL-MCNC: 6.9 G/DL (ref 6.4–8.2)
RBC # BLD AUTO: 4.71 X10*6/UL (ref 4–5.2)
SODIUM SERPL-SCNC: 137 MMOL/L (ref 136–145)
TIBC SERPL-MCNC: 350 UG/DL (ref 240–445)
TRIGL SERPL-MCNC: 189 MG/DL (ref 0–149)
TSH SERPL-ACNC: 1.45 MIU/L (ref 0.44–3.98)
UIBC SERPL-MCNC: 292 UG/DL (ref 110–370)
VLDL: 38 MG/DL (ref 0–40)
WBC # BLD AUTO: 6.6 X10*3/UL (ref 4.4–11.3)

## 2024-05-29 PROCEDURE — 83550 IRON BINDING TEST: CPT

## 2024-05-29 PROCEDURE — 85025 COMPLETE CBC W/AUTO DIFF WBC: CPT

## 2024-05-29 PROCEDURE — 80061 LIPID PANEL: CPT

## 2024-05-29 PROCEDURE — 83735 ASSAY OF MAGNESIUM: CPT

## 2024-05-29 PROCEDURE — 82306 VITAMIN D 25 HYDROXY: CPT

## 2024-05-29 PROCEDURE — 80053 COMPREHEN METABOLIC PANEL: CPT

## 2024-05-29 PROCEDURE — 36415 COLL VENOUS BLD VENIPUNCTURE: CPT

## 2024-05-29 PROCEDURE — 83540 ASSAY OF IRON: CPT

## 2024-05-29 PROCEDURE — 84443 ASSAY THYROID STIM HORMONE: CPT

## 2024-05-30 DIAGNOSIS — K21.9 GASTROESOPHAGEAL REFLUX DISEASE, UNSPECIFIED WHETHER ESOPHAGITIS PRESENT: ICD-10-CM

## 2024-07-04 DIAGNOSIS — G43.109 MIGRAINE WITH AURA AND WITHOUT STATUS MIGRAINOSUS, NOT INTRACTABLE: ICD-10-CM

## 2024-07-08 RX ORDER — PROPRANOLOL HYDROCHLORIDE 60 MG/1
60 CAPSULE, EXTENDED RELEASE ORAL DAILY
Qty: 90 CAPSULE | Refills: 0 | Status: SHIPPED | OUTPATIENT
Start: 2024-07-08

## 2024-07-17 ENCOUNTER — APPOINTMENT (OUTPATIENT)
Dept: GASTROENTEROLOGY | Facility: CLINIC | Age: 24
End: 2024-07-17
Payer: COMMERCIAL

## 2024-07-17 ASSESSMENT — ENCOUNTER SYMPTOMS
HEMATURIA: 0
DIARRHEA: 1
CHILLS: 0
BACK PAIN: 1
ABDOMINAL PAIN: 1
CONSTIPATION: 0
SORE THROAT: 0
ANOREXIA: 0
NAUSEA: 0
FREQUENCY: 1
FEVER: 0
FLANK PAIN: 1
VOMITING: 0
HEADACHES: 0
DYSURIA: 0

## 2024-07-18 ENCOUNTER — PREP FOR PROCEDURE (OUTPATIENT)
Dept: OBSTETRICS AND GYNECOLOGY | Facility: CLINIC | Age: 24
End: 2024-07-18

## 2024-07-18 ENCOUNTER — APPOINTMENT (OUTPATIENT)
Dept: OBSTETRICS AND GYNECOLOGY | Facility: CLINIC | Age: 24
End: 2024-07-18
Payer: COMMERCIAL

## 2024-07-18 ENCOUNTER — HOSPITAL ENCOUNTER (OUTPATIENT)
Facility: HOSPITAL | Age: 24
Setting detail: OUTPATIENT SURGERY
End: 2024-07-18
Attending: OBSTETRICS & GYNECOLOGY | Admitting: OBSTETRICS & GYNECOLOGY
Payer: COMMERCIAL

## 2024-07-18 VITALS — WEIGHT: 264 LBS | SYSTOLIC BLOOD PRESSURE: 110 MMHG | BODY MASS INDEX: 40.14 KG/M2 | DIASTOLIC BLOOD PRESSURE: 76 MMHG

## 2024-07-18 DIAGNOSIS — Z30.09 UNWANTED FERTILITY: Primary | ICD-10-CM

## 2024-07-18 PROBLEM — Z30.2 ENCOUNTER FOR STERILIZATION: Status: ACTIVE | Noted: 2024-07-18

## 2024-07-18 PROCEDURE — 99205 OFFICE O/P NEW HI 60 MIN: CPT | Performed by: OBSTETRICS & GYNECOLOGY

## 2024-07-18 RX ORDER — TRANEXAMIC ACID 650 MG/1
1300 TABLET ORAL ONCE
OUTPATIENT
Start: 2024-07-18 | End: 2024-07-18

## 2024-07-18 RX ORDER — ACETAMINOPHEN 325 MG/1
975 TABLET ORAL ONCE
OUTPATIENT
Start: 2024-07-18 | End: 2024-07-18

## 2024-07-18 RX ORDER — GABAPENTIN 600 MG/1
600 TABLET ORAL ONCE
OUTPATIENT
Start: 2024-07-18 | End: 2024-07-18

## 2024-07-18 RX ORDER — CELECOXIB 400 MG/1
400 CAPSULE ORAL ONCE
OUTPATIENT
Start: 2024-07-18 | End: 2024-07-18

## 2024-07-18 RX ORDER — SODIUM CHLORIDE, SODIUM LACTATE, POTASSIUM CHLORIDE, CALCIUM CHLORIDE 600; 310; 30; 20 MG/100ML; MG/100ML; MG/100ML; MG/100ML
100 INJECTION, SOLUTION INTRAVENOUS CONTINUOUS
OUTPATIENT
Start: 2024-07-18

## 2024-07-18 ASSESSMENT — PAIN SCALES - GENERAL: PAINLEVEL: 0-NO PAIN

## 2024-07-18 NOTE — PROGRESS NOTES
GYN PROGRESS NOTE          CC:   Surgery consult for tubal sterilization     HPI:  Nancy Jose is scheduled today for sterilization consultation.  Childbearing completed.  No GYN c/o today.  Patient 100% certain about decision for sterilization.  No issues previously with surgery or anesthesia.       ROS:  GEN - no fevers or chills  RESP - no SOB or cough  GYN - see HPI      HISTORY:  Past Medical History:   Diagnosis Date    Encounter for pregnancy test, result positive ([facility]) 11/01/2021    Positive urine pregnancy test    Encounter for screening for respiratory tuberculosis 02/07/2020    Tuberculosis screening    Encounter for supervision of normal pregnancy, unspecified, unspecified trimester ([facility]) 07/30/2020    Supervision of normal pregnancy    Less than 8 weeks gestation of pregnancy ([facility]) 06/29/2020    Less than 8 weeks gestation of pregnancy    Personal history of other specified conditions 06/05/2020    History of abdominal pain    Postpartum depression 11/25/2019    Postpartum depression associated with first pregnancy    Vomiting of pregnancy, unspecified ([facility]) 07/30/2020    Nausea/vomiting in pregnancy     No past surgical history on file.  Social History     Socioeconomic History    Marital status:      Spouse name: Not on file    Number of children: Not on file    Years of education: Not on file    Highest education level: Not on file   Occupational History    Not on file   Tobacco Use    Smoking status: Every Day     Types: Cigarettes    Smokeless tobacco: Never   Vaping Use    Vaping status: Never Used   Substance and Sexual Activity    Alcohol use: Yes     Comment: occ.    Drug use: Never    Sexual activity: Not on file   Other Topics Concern    Not on file   Social History Narrative    Not on file     Social Determinants of Health     Financial Resource Strain: Not on file   Food Insecurity: Not on file   Transportation Needs: Not on file   Physical Activity: Not on  file   Stress: Not on file   Social Connections: Not on file   Intimate Partner Violence: Not on file   Housing Stability: Not on file     Cancer-related family history includes Cancer in her mother.       PHYSICAL EXAM:  /76 (BP Location: Left arm, Patient Position: Sitting, BP Cuff Size: Adult)   Wt 120 kg (264 lb)   LMP 06/20/2024 Comment: irregular  BMI 40.14 kg/m²   GEN:  A&O, NAD  ABD:  no umbilical hernias  PSYCH:  normal affect, non-anxious      IMPRESSION/PLAN:    Undesired fertility    Plan:  Laparoscopic bilateral salpingectomy      Risks specific to tubal ligation including failure. ectopic and regret discussed with the patient. Risks benefits alternatives discussed with the patient risks including bleeding infection or damage to surrounding tissues.  Bleeding requiring blood transfusion transfusion reaction or infection.  Infection of the superficial or deep spaces.  Damage to bladder bowel ureters vascular structures abdominal wall.  Requiring further surgery acutely or prolonged hospitalization or returned to the operating room.  All questions answered to the best my ability.  Discussed increased rate of regret under 30 years old.  Discussed inability to predict future desires and if she has any doubt she should delay and used long-term reversible methods      Babak Guardado MD

## 2024-07-28 DIAGNOSIS — G43.109 MIGRAINE WITH AURA AND WITHOUT STATUS MIGRAINOSUS, NOT INTRACTABLE: ICD-10-CM

## 2024-07-29 RX ORDER — PROPRANOLOL HYDROCHLORIDE 60 MG/1
60 CAPSULE, EXTENDED RELEASE ORAL DAILY
Qty: 90 CAPSULE | Refills: 0 | Status: SHIPPED | OUTPATIENT
Start: 2024-07-29

## 2024-08-02 PROBLEM — F41.9 ANXIETY: Status: ACTIVE | Noted: 2024-08-02

## 2024-08-02 PROBLEM — B37.2 CANDIDAL INTERTRIGO: Status: ACTIVE | Noted: 2024-08-02

## 2024-08-02 PROBLEM — R11.14 BILIOUS VOMITING: Status: ACTIVE | Noted: 2021-09-23

## 2024-08-02 PROBLEM — R10.9 ABDOMINAL PAIN: Status: ACTIVE | Noted: 2024-08-02

## 2024-08-02 PROBLEM — K82.4 CHOLESTEROLOSIS OF GALLBLADDER: Status: ACTIVE | Noted: 2021-09-23

## 2024-08-02 PROBLEM — B49 INFECTION DUE TO FUNGUS: Status: ACTIVE | Noted: 2023-05-16

## 2024-08-02 PROBLEM — K76.0 FATTY (CHANGE OF) LIVER, NOT ELSEWHERE CLASSIFIED: Status: ACTIVE | Noted: 2021-09-23

## 2024-08-02 PROBLEM — O20.0 THREATENED ABORTION (HHS-HCC): Status: ACTIVE | Noted: 2018-07-06

## 2024-08-21 ENCOUNTER — APPOINTMENT (OUTPATIENT)
Dept: GASTROENTEROLOGY | Facility: CLINIC | Age: 24
End: 2024-08-21
Payer: COMMERCIAL

## 2024-08-22 VITALS — BODY MASS INDEX: 37.1 KG/M2 | WEIGHT: 244 LBS

## 2024-09-09 ENCOUNTER — APPOINTMENT (OUTPATIENT)
Dept: OBSTETRICS AND GYNECOLOGY | Facility: CLINIC | Age: 24
End: 2024-09-09
Payer: COMMERCIAL

## 2024-09-11 ENCOUNTER — APPOINTMENT (OUTPATIENT)
Dept: DERMATOLOGY | Facility: CLINIC | Age: 24
End: 2024-09-11
Payer: COMMERCIAL

## 2024-09-12 ENCOUNTER — PREP FOR PROCEDURE (OUTPATIENT)
Dept: OBSTETRICS AND GYNECOLOGY | Facility: CLINIC | Age: 24
End: 2024-09-12
Payer: COMMERCIAL

## 2024-09-12 DIAGNOSIS — Z30.09 UNWANTED FERTILITY: Primary | ICD-10-CM

## 2024-09-12 RX ORDER — ACETAMINOPHEN 325 MG/1
975 TABLET ORAL ONCE
OUTPATIENT
Start: 2024-09-12 | End: 2024-09-12

## 2024-09-12 RX ORDER — TRANEXAMIC ACID 650 MG/1
1300 TABLET ORAL ONCE
OUTPATIENT
Start: 2024-09-12 | End: 2024-09-12

## 2024-09-12 RX ORDER — GABAPENTIN 600 MG/1
600 TABLET ORAL ONCE
OUTPATIENT
Start: 2024-09-12 | End: 2024-09-12

## 2024-09-12 RX ORDER — CELECOXIB 400 MG/1
400 CAPSULE ORAL ONCE
OUTPATIENT
Start: 2024-09-12 | End: 2024-09-12

## 2024-09-12 RX ORDER — SODIUM CHLORIDE, SODIUM LACTATE, POTASSIUM CHLORIDE, CALCIUM CHLORIDE 600; 310; 30; 20 MG/100ML; MG/100ML; MG/100ML; MG/100ML
20 INJECTION, SOLUTION INTRAVENOUS CONTINUOUS
OUTPATIENT
Start: 2024-09-12

## 2024-09-20 ENCOUNTER — OFFICE VISIT (OUTPATIENT)
Dept: OBSTETRICS AND GYNECOLOGY | Facility: CLINIC | Age: 24
End: 2024-09-20
Payer: COMMERCIAL

## 2024-09-20 VITALS — BODY MASS INDEX: 41.11 KG/M2 | DIASTOLIC BLOOD PRESSURE: 78 MMHG | WEIGHT: 270.4 LBS | SYSTOLIC BLOOD PRESSURE: 120 MMHG

## 2024-09-20 DIAGNOSIS — N91.2 AMENORRHEA: Primary | ICD-10-CM

## 2024-09-20 DIAGNOSIS — R10.2 PELVIC PAIN: ICD-10-CM

## 2024-09-20 DIAGNOSIS — N89.8 VAGINAL IRRITATION: ICD-10-CM

## 2024-09-20 DIAGNOSIS — Z87.42 HISTORY OF ABNORMAL CERVICAL PAP SMEAR: ICD-10-CM

## 2024-09-20 LAB — PREGNANCY TEST URINE, POC: NEGATIVE

## 2024-09-20 PROCEDURE — 87205 SMEAR GRAM STAIN: CPT

## 2024-09-20 PROCEDURE — 87624 HPV HI-RISK TYP POOLED RSLT: CPT

## 2024-09-20 PROCEDURE — 87591 N.GONORRHOEAE DNA AMP PROB: CPT

## 2024-09-20 PROCEDURE — 81025 URINE PREGNANCY TEST: CPT

## 2024-09-20 PROCEDURE — 87491 CHLMYD TRACH DNA AMP PROBE: CPT

## 2024-09-20 PROCEDURE — 99214 OFFICE O/P EST MOD 30 MIN: CPT

## 2024-09-20 PROCEDURE — 87661 TRICHOMONAS VAGINALIS AMPLIF: CPT

## 2024-09-20 ASSESSMENT — ENCOUNTER SYMPTOMS
NAUSEA: 1
HEMATURIA: 0
HEADACHES: 0
HEMATOCHEZIA: 0
FLATUS: 1
ANOREXIA: 0
BELCHING: 1
DIARRHEA: 1
FREQUENCY: 1
ARTHRALGIAS: 1
FEVER: 0
CONSTIPATION: 0
VOMITING: 0
DYSURIA: 0
MYALGIAS: 1
ABDOMINAL PAIN: 1
WEIGHT LOSS: 0

## 2024-09-20 NOTE — PROGRESS NOTES
Subjective   Patient ID: Nancy Jose is a 24 y.o. female who presents for pelvic pain and amenorrhea..    Patient reports concerns for pelvic pain and amenorrhea.  Patient reports that pelvic pain started approximately 3 months ago.  Patient states that pain starts on the left and radiates to the right and into her lower back.  Patient reports that pain occurs approximately 3-4 times a week and lasts for 20 to 30 minutes.  Patient denies any aggravating factors but does report good relief of lanny with Midol.  Patient reports not having a menstrual cycle for the last 3 months which has coincided with her pelvic pain. Patient reports a history of irregular menses with never having a regular menstrual cycle, though going 3 months without a period is atypical for her.   Patient denies any urinary symptoms but does report that her bowel movements have become softer but she would not characterize them as diarrhea.    Patient does report some increase in stress with her son starting school and having to help her father.     Patient denies any concern for STIs.  Patient reports vaginal discharge that comes and goes though denies any odor, itching, or vaginal irritation.    Chart notable for a 30 pound weight gain in 29 days.    Patient with LSIL hpv neg Pap in 2021 with repeat normal Pap in 2022 therefore we will repeat Pap today.      Objective   Physical Exam  Constitutional:       Appearance: Normal appearance.   Eyes:      Conjunctiva/sclera: Conjunctivae normal.   Pulmonary:      Effort: Pulmonary effort is normal.   Genitourinary:     Labia:         Right: No rash, tenderness, lesion or injury.         Left: No rash, tenderness, lesion or injury.       Vagina: Vaginal discharge present.      Cervix: Normal.      Uterus: Normal.       Adnexa: Right adnexa normal and left adnexa normal.      Comments: Scant amount of white milky discharge noted in vaginal vault.  No odor appreciated.  Neurological:      Mental  Status: She is alert.   Psychiatric:         Mood and Affect: Mood normal.         Assessment/Plan   Amenorrhea: Discussed with patient potential causes of amenorrhea including sudden increase in weight, stress, PCOS, hormone imbalances, and pregnancy.  UPT today in office negative. TSH, FSH, LH, estradiol, prolactin, testosterone, dhea, 17-OHP, and hgba1c orders placed.     Pelvic pain: Vaginitis and STI cultures collected today to rule out infection as possible cause of pain.  Pelvic ultrasound ordered to rule out structural causes of pain.    Patient due for Pap therefore Pap updated today.  If within normal limit patient should have repeat Pap in 1 year due to history.    Visit length 35 minutes with greater than 50% spent counseling and coordinating care as discussed above.

## 2024-09-21 ENCOUNTER — LAB (OUTPATIENT)
Dept: LAB | Facility: LAB | Age: 24
End: 2024-09-21
Payer: COMMERCIAL

## 2024-09-21 DIAGNOSIS — N91.2 AMENORRHEA: ICD-10-CM

## 2024-09-21 DIAGNOSIS — E55.9 VITAMIN D DEFICIENCY: ICD-10-CM

## 2024-09-21 DIAGNOSIS — E78.2 MIXED HYPERLIPIDEMIA: ICD-10-CM

## 2024-09-21 LAB
25(OH)D3 SERPL-MCNC: 28 NG/ML (ref 30–100)
ALBUMIN SERPL BCP-MCNC: 4.1 G/DL (ref 3.4–5)
ALP SERPL-CCNC: 64 U/L (ref 33–110)
ALT SERPL W P-5'-P-CCNC: 18 U/L (ref 7–45)
ANION GAP SERPL CALC-SCNC: 12 MMOL/L (ref 10–20)
AST SERPL W P-5'-P-CCNC: 13 U/L (ref 9–39)
BILIRUB SERPL-MCNC: 0.6 MG/DL (ref 0–1.2)
BUN SERPL-MCNC: 10 MG/DL (ref 6–23)
C TRACH RRNA SPEC QL NAA+PROBE: NEGATIVE
CALCIUM SERPL-MCNC: 8.8 MG/DL (ref 8.6–10.3)
CHLORIDE SERPL-SCNC: 104 MMOL/L (ref 98–107)
CHOLEST SERPL-MCNC: 188 MG/DL (ref 0–199)
CHOLESTEROL/HDL RATIO: 5.3
CO2 SERPL-SCNC: 25 MMOL/L (ref 21–32)
CREAT SERPL-MCNC: 0.59 MG/DL (ref 0.5–1.05)
DHEA-S SERPL-MCNC: 223 UG/DL (ref 65–395)
EGFRCR SERPLBLD CKD-EPI 2021: >90 ML/MIN/1.73M*2
EST. AVERAGE GLUCOSE BLD GHB EST-MCNC: 105 MG/DL
FSH SERPL-ACNC: 5 IU/L
GLUCOSE SERPL-MCNC: 90 MG/DL (ref 74–99)
HBA1C MFR BLD: 5.3 %
HDLC SERPL-MCNC: 35.6 MG/DL
LDLC SERPL CALC-MCNC: 112 MG/DL
LH SERPL-ACNC: 9.2 IU/L
N GONORRHOEA DNA SPEC QL PROBE+SIG AMP: NEGATIVE
NON HDL CHOLESTEROL: 152 MG/DL (ref 0–149)
POTASSIUM SERPL-SCNC: 4.4 MMOL/L (ref 3.5–5.3)
PROLACTIN SERPL-MCNC: 6.8 UG/L (ref 3–20)
PROT SERPL-MCNC: 7.1 G/DL (ref 6.4–8.2)
SODIUM SERPL-SCNC: 137 MMOL/L (ref 136–145)
T VAGINALIS RRNA SPEC QL NAA+PROBE: NEGATIVE
TRIGL SERPL-MCNC: 200 MG/DL (ref 0–149)
TSH SERPL-ACNC: 1.23 MIU/L (ref 0.44–3.98)
VLDL: 40 MG/DL (ref 0–40)

## 2024-09-21 PROCEDURE — 82627 DEHYDROEPIANDROSTERONE: CPT

## 2024-09-21 PROCEDURE — 84443 ASSAY THYROID STIM HORMONE: CPT

## 2024-09-21 PROCEDURE — 80061 LIPID PANEL: CPT

## 2024-09-21 PROCEDURE — 83002 ASSAY OF GONADOTROPIN (LH): CPT

## 2024-09-21 PROCEDURE — 83036 HEMOGLOBIN GLYCOSYLATED A1C: CPT

## 2024-09-21 PROCEDURE — 84146 ASSAY OF PROLACTIN: CPT

## 2024-09-21 PROCEDURE — 80053 COMPREHEN METABOLIC PANEL: CPT

## 2024-09-21 PROCEDURE — 84402 ASSAY OF FREE TESTOSTERONE: CPT

## 2024-09-21 PROCEDURE — 36415 COLL VENOUS BLD VENIPUNCTURE: CPT

## 2024-09-21 PROCEDURE — 82306 VITAMIN D 25 HYDROXY: CPT

## 2024-09-21 PROCEDURE — 83498 ASY HYDROXYPROGESTERONE 17-D: CPT

## 2024-09-21 PROCEDURE — 83001 ASSAY OF GONADOTROPIN (FSH): CPT

## 2024-09-23 LAB
CLUE CELLS VAG LPF-#/AREA: NORMAL /[LPF]
NUGENT SCORE: 0
YEAST VAG WET PREP-#/AREA: NORMAL

## 2024-09-25 ENCOUNTER — HOSPITAL ENCOUNTER (OUTPATIENT)
Dept: RADIOLOGY | Facility: HOSPITAL | Age: 24
Discharge: HOME | End: 2024-09-25
Payer: COMMERCIAL

## 2024-09-25 DIAGNOSIS — R10.2 PELVIC PAIN: ICD-10-CM

## 2024-09-25 PROCEDURE — 76830 TRANSVAGINAL US NON-OB: CPT | Performed by: RADIOLOGY

## 2024-09-25 PROCEDURE — 76856 US EXAM PELVIC COMPLETE: CPT | Performed by: RADIOLOGY

## 2024-09-25 PROCEDURE — 76856 US EXAM PELVIC COMPLETE: CPT

## 2024-09-26 LAB — 17OHP SERPL-MCNC: 57.59 NG/DL

## 2024-09-27 LAB
TESTOSTERONE FREE (CHAN): 8.5 PG/ML (ref 0.1–6.4)
TESTOSTERONE,TOTAL,LC-MS/MS: 50 NG/DL (ref 2–45)

## 2024-09-30 ENCOUNTER — DOCUMENTATION (OUTPATIENT)
Dept: OBSTETRICS AND GYNECOLOGY | Facility: HOSPITAL | Age: 24
End: 2024-09-30
Payer: COMMERCIAL

## 2024-09-30 DIAGNOSIS — E28.2 PCOS (POLYCYSTIC OVARIAN SYNDROME): Primary | ICD-10-CM

## 2024-09-30 DIAGNOSIS — E66.813 CLASS 3 SEVERE OBESITY DUE TO EXCESS CALORIES WITHOUT SERIOUS COMORBIDITY WITH BODY MASS INDEX (BMI) OF 40.0 TO 44.9 IN ADULT: ICD-10-CM

## 2024-09-30 DIAGNOSIS — E66.01 CLASS 3 SEVERE OBESITY DUE TO EXCESS CALORIES WITHOUT SERIOUS COMORBIDITY WITH BODY MASS INDEX (BMI) OF 40.0 TO 44.9 IN ADULT: ICD-10-CM

## 2024-09-30 NOTE — PROGRESS NOTES
Called patient and discussed results of lab work and pelvic US. Results indicate PCOS.    Discussed with patient the definition of PCOS, management, and potential sequela from the syndrome including increased risk of CVD, endometrial hyperplasia possibly leading to neoplasm, diabetes, high cholesterol and metabolic  syndrome.     Discussed management of the syndrome with lifestyle changes including weight reduction.  Discussed treatment of oligomenorrhea/amenorrhea and endometrial protection with use of contraception. Patient agreeable to Mirena IUD. As she has a sterilization scheduled with Dr. Guardado this provider will ask Dr. Guardado to place device while patient is anesthetized.     Offered patient referral to nutrition services for education on healthy eating for weight loss which patient is agreeable to.     Patient encouraged to notify PCP of diagnosis as this may effect preventative screening.      Update: Dr. Guardado agreeable to placing Mirena device.

## 2024-10-02 ENCOUNTER — PREP FOR PROCEDURE (OUTPATIENT)
Dept: OBSTETRICS AND GYNECOLOGY | Facility: CLINIC | Age: 24
End: 2024-10-02
Payer: COMMERCIAL

## 2024-10-04 LAB
CYTOLOGY CMNT CVX/VAG CYTO-IMP: NORMAL
HPV HR 12 DNA GENITAL QL NAA+PROBE: NEGATIVE
HPV HR GENOTYPES PNL CVX NAA+PROBE: NEGATIVE
HPV16 DNA SPEC QL NAA+PROBE: NEGATIVE
HPV18 DNA SPEC QL NAA+PROBE: NEGATIVE
LAB AP HPV GENOTYPE QUESTION: YES
LAB AP HPV HR: NORMAL
LABORATORY COMMENT REPORT: NORMAL
PATH REPORT.TOTAL CANCER: NORMAL

## 2024-10-28 ENCOUNTER — PREP FOR PROCEDURE (OUTPATIENT)
Dept: OBSTETRICS AND GYNECOLOGY | Facility: CLINIC | Age: 24
End: 2024-10-28

## 2024-10-28 ENCOUNTER — LAB (OUTPATIENT)
Dept: LAB | Facility: LAB | Age: 24
End: 2024-10-28
Payer: COMMERCIAL

## 2024-10-28 DIAGNOSIS — Z30.09 UNWANTED FERTILITY: ICD-10-CM

## 2024-10-28 LAB
ABO GROUP (TYPE) IN BLOOD: NORMAL
ANION GAP SERPL CALC-SCNC: 9 MMOL/L (ref 10–20)
ANTIBODY SCREEN: NORMAL
BUN SERPL-MCNC: 8 MG/DL (ref 6–23)
CALCIUM SERPL-MCNC: 8.9 MG/DL (ref 8.6–10.3)
CHLORIDE SERPL-SCNC: 104 MMOL/L (ref 98–107)
CO2 SERPL-SCNC: 29 MMOL/L (ref 21–32)
CREAT SERPL-MCNC: 0.64 MG/DL (ref 0.5–1.05)
EGFRCR SERPLBLD CKD-EPI 2021: >90 ML/MIN/1.73M*2
ERYTHROCYTE [DISTWIDTH] IN BLOOD BY AUTOMATED COUNT: 14 % (ref 11.5–14.5)
GLUCOSE SERPL-MCNC: 93 MG/DL (ref 74–99)
HCT VFR BLD AUTO: 36.6 % (ref 36–46)
HGB BLD-MCNC: 11.4 G/DL (ref 12–16)
MCH RBC QN AUTO: 25.8 PG (ref 26–34)
MCHC RBC AUTO-ENTMCNC: 31.1 G/DL (ref 32–36)
MCV RBC AUTO: 83 FL (ref 80–100)
NRBC BLD-RTO: 0 /100 WBCS (ref 0–0)
PLATELET # BLD AUTO: 360 X10*3/UL (ref 150–450)
POTASSIUM SERPL-SCNC: 4.7 MMOL/L (ref 3.5–5.3)
RBC # BLD AUTO: 4.42 X10*6/UL (ref 4–5.2)
RH FACTOR (ANTIGEN D): NORMAL
SODIUM SERPL-SCNC: 137 MMOL/L (ref 136–145)
WBC # BLD AUTO: 6.1 X10*3/UL (ref 4.4–11.3)

## 2024-10-28 PROCEDURE — 85027 COMPLETE CBC AUTOMATED: CPT

## 2024-10-28 PROCEDURE — 80048 BASIC METABOLIC PNL TOTAL CA: CPT

## 2024-10-28 PROCEDURE — 86850 RBC ANTIBODY SCREEN: CPT

## 2024-10-28 PROCEDURE — 36415 COLL VENOUS BLD VENIPUNCTURE: CPT

## 2024-10-28 PROCEDURE — 86901 BLOOD TYPING SEROLOGIC RH(D): CPT

## 2024-10-28 PROCEDURE — 86900 BLOOD TYPING SEROLOGIC ABO: CPT

## 2024-10-29 ENCOUNTER — ANESTHESIA (OUTPATIENT)
Dept: OPERATING ROOM | Facility: HOSPITAL | Age: 24
End: 2024-10-29
Payer: COMMERCIAL

## 2024-10-29 ENCOUNTER — ANESTHESIA EVENT (OUTPATIENT)
Dept: OPERATING ROOM | Facility: HOSPITAL | Age: 24
End: 2024-10-29
Payer: COMMERCIAL

## 2024-10-29 ENCOUNTER — HOSPITAL ENCOUNTER (OUTPATIENT)
Facility: HOSPITAL | Age: 24
Setting detail: OUTPATIENT SURGERY
Discharge: HOME | End: 2024-10-29
Attending: OBSTETRICS & GYNECOLOGY | Admitting: OBSTETRICS & GYNECOLOGY
Payer: COMMERCIAL

## 2024-10-29 VITALS
DIASTOLIC BLOOD PRESSURE: 79 MMHG | WEIGHT: 270.28 LBS | HEART RATE: 79 BPM | BODY MASS INDEX: 40.96 KG/M2 | HEIGHT: 68 IN | RESPIRATION RATE: 16 BRPM | OXYGEN SATURATION: 98 % | TEMPERATURE: 97.7 F | SYSTOLIC BLOOD PRESSURE: 124 MMHG

## 2024-10-29 DIAGNOSIS — Z30.09 UNWANTED FERTILITY: ICD-10-CM

## 2024-10-29 DIAGNOSIS — G89.18 POSTOPERATIVE PAIN: Primary | ICD-10-CM

## 2024-10-29 DIAGNOSIS — Z41.9 SURGERY, ELECTIVE: ICD-10-CM

## 2024-10-29 LAB
ABO GROUP (TYPE) IN BLOOD: NORMAL
PREGNANCY TEST URINE, POC: NEGATIVE
RH FACTOR (ANTIGEN D): NORMAL

## 2024-10-29 PROCEDURE — 88302 TISSUE EXAM BY PATHOLOGIST: CPT | Performed by: PATHOLOGY

## 2024-10-29 PROCEDURE — 2500000004 HC RX 250 GENERAL PHARMACY W/ HCPCS (ALT 636 FOR OP/ED): Performed by: OBSTETRICS & GYNECOLOGY

## 2024-10-29 PROCEDURE — 3600000003 HC OR TIME - INITIAL BASE CHARGE - PROCEDURE LEVEL THREE: Performed by: OBSTETRICS & GYNECOLOGY

## 2024-10-29 PROCEDURE — 2500000001 HC RX 250 WO HCPCS SELF ADMINISTERED DRUGS (ALT 637 FOR MEDICARE OP): Performed by: STUDENT IN AN ORGANIZED HEALTH CARE EDUCATION/TRAINING PROGRAM

## 2024-10-29 PROCEDURE — 7100000001 HC RECOVERY ROOM TIME - INITIAL BASE CHARGE: Performed by: OBSTETRICS & GYNECOLOGY

## 2024-10-29 PROCEDURE — 0751T DGTZ GLS MCRSCP SLD LEVEL II: CPT | Mod: TC,ELYLAB | Performed by: OBSTETRICS & GYNECOLOGY

## 2024-10-29 PROCEDURE — 3700000002 HC GENERAL ANESTHESIA TIME - EACH INCREMENTAL 1 MINUTE: Performed by: OBSTETRICS & GYNECOLOGY

## 2024-10-29 PROCEDURE — 7100000010 HC PHASE TWO TIME - EACH INCREMENTAL 1 MINUTE: Performed by: OBSTETRICS & GYNECOLOGY

## 2024-10-29 PROCEDURE — 2500000005 HC RX 250 GENERAL PHARMACY W/O HCPCS: Performed by: OBSTETRICS & GYNECOLOGY

## 2024-10-29 PROCEDURE — 2500000001 HC RX 250 WO HCPCS SELF ADMINISTERED DRUGS (ALT 637 FOR MEDICARE OP): Performed by: OBSTETRICS & GYNECOLOGY

## 2024-10-29 PROCEDURE — 81025 URINE PREGNANCY TEST: CPT | Performed by: OBSTETRICS & GYNECOLOGY

## 2024-10-29 PROCEDURE — 3700000001 HC GENERAL ANESTHESIA TIME - INITIAL BASE CHARGE: Performed by: OBSTETRICS & GYNECOLOGY

## 2024-10-29 PROCEDURE — 7100000002 HC RECOVERY ROOM TIME - EACH INCREMENTAL 1 MINUTE: Performed by: OBSTETRICS & GYNECOLOGY

## 2024-10-29 PROCEDURE — 2500000004 HC RX 250 GENERAL PHARMACY W/ HCPCS (ALT 636 FOR OP/ED): Performed by: NURSE ANESTHETIST, CERTIFIED REGISTERED

## 2024-10-29 PROCEDURE — 7100000009 HC PHASE TWO TIME - INITIAL BASE CHARGE: Performed by: OBSTETRICS & GYNECOLOGY

## 2024-10-29 PROCEDURE — 36415 COLL VENOUS BLD VENIPUNCTURE: CPT | Performed by: OBSTETRICS & GYNECOLOGY

## 2024-10-29 PROCEDURE — 2500000002 HC RX 250 W HCPCS SELF ADMINISTERED DRUGS (ALT 637 FOR MEDICARE OP, ALT 636 FOR OP/ED): Performed by: OBSTETRICS & GYNECOLOGY

## 2024-10-29 PROCEDURE — 3600000008 HC OR TIME - EACH INCREMENTAL 1 MINUTE - PROCEDURE LEVEL THREE: Performed by: OBSTETRICS & GYNECOLOGY

## 2024-10-29 PROCEDURE — 2720000007 HC OR 272 NO HCPCS: Performed by: OBSTETRICS & GYNECOLOGY

## 2024-10-29 RX ORDER — LIDOCAINE HYDROCHLORIDE 20 MG/ML
INJECTION, SOLUTION INFILTRATION; PERINEURAL AS NEEDED
Status: DISCONTINUED | OUTPATIENT
Start: 2024-10-29 | End: 2024-10-29

## 2024-10-29 RX ORDER — ROCURONIUM BROMIDE 10 MG/ML
INJECTION, SOLUTION INTRAVENOUS AS NEEDED
Status: DISCONTINUED | OUTPATIENT
Start: 2024-10-29 | End: 2024-10-29

## 2024-10-29 RX ORDER — ONDANSETRON HYDROCHLORIDE 2 MG/ML
INJECTION, SOLUTION INTRAVENOUS AS NEEDED
Status: DISCONTINUED | OUTPATIENT
Start: 2024-10-29 | End: 2024-10-29

## 2024-10-29 RX ORDER — DOCUSATE SODIUM 100 MG/1
100 CAPSULE, LIQUID FILLED ORAL 2 TIMES DAILY
Qty: 60 CAPSULE | Refills: 0 | Status: SHIPPED | OUTPATIENT
Start: 2024-10-29 | End: 2024-11-28

## 2024-10-29 RX ORDER — TRANEXAMIC ACID 650 MG/1
1300 TABLET ORAL ONCE
Status: COMPLETED | OUTPATIENT
Start: 2024-10-29 | End: 2024-10-29

## 2024-10-29 RX ORDER — TRAMADOL HYDROCHLORIDE 50 MG/1
50 TABLET ORAL EVERY 6 HOURS PRN
Qty: 12 TABLET | Refills: 0 | Status: SHIPPED | OUTPATIENT
Start: 2024-10-29 | End: 2024-11-01

## 2024-10-29 RX ORDER — SODIUM CHLORIDE, SODIUM LACTATE, POTASSIUM CHLORIDE, CALCIUM CHLORIDE 600; 310; 30; 20 MG/100ML; MG/100ML; MG/100ML; MG/100ML
20 INJECTION, SOLUTION INTRAVENOUS CONTINUOUS
Status: DISCONTINUED | OUTPATIENT
Start: 2024-10-29 | End: 2024-10-29 | Stop reason: HOSPADM

## 2024-10-29 RX ORDER — SODIUM CHLORIDE, SODIUM LACTATE, POTASSIUM CHLORIDE, CALCIUM CHLORIDE 600; 310; 30; 20 MG/100ML; MG/100ML; MG/100ML; MG/100ML
100 INJECTION, SOLUTION INTRAVENOUS CONTINUOUS
Status: DISCONTINUED | OUTPATIENT
Start: 2024-10-29 | End: 2024-10-29 | Stop reason: HOSPADM

## 2024-10-29 RX ORDER — BUPIVACAINE HCL/EPINEPHRINE 0.25-.0005
VIAL (ML) INJECTION AS NEEDED
Status: DISCONTINUED | OUTPATIENT
Start: 2024-10-29 | End: 2024-10-29 | Stop reason: HOSPADM

## 2024-10-29 RX ORDER — DIPHENHYDRAMINE HYDROCHLORIDE 50 MG/ML
12.5 INJECTION INTRAMUSCULAR; INTRAVENOUS ONCE AS NEEDED
Status: DISCONTINUED | OUTPATIENT
Start: 2024-10-29 | End: 2024-10-29 | Stop reason: HOSPADM

## 2024-10-29 RX ORDER — DIPHENHYDRAMINE HYDROCHLORIDE 50 MG/ML
INJECTION INTRAMUSCULAR; INTRAVENOUS AS NEEDED
Status: DISCONTINUED | OUTPATIENT
Start: 2024-10-29 | End: 2024-10-29

## 2024-10-29 RX ORDER — FENTANYL CITRATE 50 UG/ML
INJECTION, SOLUTION INTRAMUSCULAR; INTRAVENOUS AS NEEDED
Status: DISCONTINUED | OUTPATIENT
Start: 2024-10-29 | End: 2024-10-29

## 2024-10-29 RX ORDER — LABETALOL HYDROCHLORIDE 5 MG/ML
5 INJECTION, SOLUTION INTRAVENOUS ONCE AS NEEDED
Status: DISCONTINUED | OUTPATIENT
Start: 2024-10-29 | End: 2024-10-29 | Stop reason: HOSPADM

## 2024-10-29 RX ORDER — ACETAMINOPHEN 325 MG/1
975 TABLET ORAL ONCE
Status: COMPLETED | OUTPATIENT
Start: 2024-10-29 | End: 2024-10-29

## 2024-10-29 RX ORDER — NAPROXEN 500 MG/1
500 TABLET ORAL 2 TIMES DAILY
Qty: 6 TABLET | Refills: 0 | Status: SHIPPED | OUTPATIENT
Start: 2024-10-29 | End: 2024-11-01

## 2024-10-29 RX ORDER — MIDAZOLAM HYDROCHLORIDE 1 MG/ML
INJECTION, SOLUTION INTRAMUSCULAR; INTRAVENOUS AS NEEDED
Status: DISCONTINUED | OUTPATIENT
Start: 2024-10-29 | End: 2024-10-29

## 2024-10-29 RX ORDER — FENTANYL CITRATE 50 UG/ML
50 INJECTION, SOLUTION INTRAMUSCULAR; INTRAVENOUS EVERY 5 MIN PRN
Status: DISCONTINUED | OUTPATIENT
Start: 2024-10-29 | End: 2024-10-29 | Stop reason: HOSPADM

## 2024-10-29 RX ORDER — OXYCODONE HYDROCHLORIDE 5 MG/1
5 TABLET ORAL EVERY 4 HOURS PRN
Status: DISCONTINUED | OUTPATIENT
Start: 2024-10-29 | End: 2024-10-29 | Stop reason: HOSPADM

## 2024-10-29 RX ORDER — GABAPENTIN 600 MG/1
600 TABLET ORAL 3 TIMES DAILY
Qty: 9 TABLET | Refills: 0 | Status: SHIPPED | OUTPATIENT
Start: 2024-10-29 | End: 2024-11-01

## 2024-10-29 RX ORDER — LIDOCAINE HYDROCHLORIDE 10 MG/ML
0.1 INJECTION, SOLUTION EPIDURAL; INFILTRATION; INTRACAUDAL; PERINEURAL ONCE
Status: DISCONTINUED | OUTPATIENT
Start: 2024-10-29 | End: 2024-10-29 | Stop reason: HOSPADM

## 2024-10-29 RX ORDER — ACETAMINOPHEN 500 MG
1000 TABLET ORAL EVERY 6 HOURS
Qty: 24 TABLET | Refills: 0 | Status: SHIPPED | OUTPATIENT
Start: 2024-10-29 | End: 2024-11-01

## 2024-10-29 RX ORDER — CELECOXIB 200 MG/1
400 CAPSULE ORAL ONCE
Status: COMPLETED | OUTPATIENT
Start: 2024-10-29 | End: 2024-10-29

## 2024-10-29 RX ORDER — DROPERIDOL 2.5 MG/ML
0.62 INJECTION, SOLUTION INTRAMUSCULAR; INTRAVENOUS ONCE AS NEEDED
Status: DISCONTINUED | OUTPATIENT
Start: 2024-10-29 | End: 2024-10-29 | Stop reason: HOSPADM

## 2024-10-29 RX ORDER — PROPOFOL 10 MG/ML
INJECTION, EMULSION INTRAVENOUS AS NEEDED
Status: DISCONTINUED | OUTPATIENT
Start: 2024-10-29 | End: 2024-10-29

## 2024-10-29 RX ORDER — GABAPENTIN 300 MG/1
600 CAPSULE ORAL ONCE
Status: COMPLETED | OUTPATIENT
Start: 2024-10-29 | End: 2024-10-29

## 2024-10-29 RX ORDER — MEPERIDINE HYDROCHLORIDE 25 MG/ML
12.5 INJECTION INTRAMUSCULAR; INTRAVENOUS; SUBCUTANEOUS EVERY 10 MIN PRN
Status: DISCONTINUED | OUTPATIENT
Start: 2024-10-29 | End: 2024-10-29 | Stop reason: HOSPADM

## 2024-10-29 ASSESSMENT — PAIN SCALES - GENERAL
PAINLEVEL_OUTOF10: 0 - NO PAIN
PAIN_LEVEL: 0
PAINLEVEL_OUTOF10: 4
PAINLEVEL_OUTOF10: 2
PAINLEVEL_OUTOF10: 0 - NO PAIN

## 2024-10-29 ASSESSMENT — PAIN - FUNCTIONAL ASSESSMENT
PAIN_FUNCTIONAL_ASSESSMENT: VAS (VISUAL ANALOG SCALE)
PAIN_FUNCTIONAL_ASSESSMENT: 0-10
PAIN_FUNCTIONAL_ASSESSMENT: UNABLE TO SELF-REPORT
PAIN_FUNCTIONAL_ASSESSMENT: 0-10
PAIN_FUNCTIONAL_ASSESSMENT: 0-10
PAIN_FUNCTIONAL_ASSESSMENT: VAS (VISUAL ANALOG SCALE)

## 2024-10-29 ASSESSMENT — COLUMBIA-SUICIDE SEVERITY RATING SCALE - C-SSRS
1. IN THE PAST MONTH, HAVE YOU WISHED YOU WERE DEAD OR WISHED YOU COULD GO TO SLEEP AND NOT WAKE UP?: NO
6. HAVE YOU EVER DONE ANYTHING, STARTED TO DO ANYTHING, OR PREPARED TO DO ANYTHING TO END YOUR LIFE?: NO
2. HAVE YOU ACTUALLY HAD ANY THOUGHTS OF KILLING YOURSELF?: NO

## 2024-10-29 ASSESSMENT — PAIN DESCRIPTION - DESCRIPTORS: DESCRIPTORS: SORE;CRAMPING

## 2024-11-04 ENCOUNTER — APPOINTMENT (OUTPATIENT)
Dept: NUTRITION | Facility: HOSPITAL | Age: 24
End: 2024-11-04
Payer: COMMERCIAL

## 2024-11-04 DIAGNOSIS — G43.109 MIGRAINE WITH AURA AND WITHOUT STATUS MIGRAINOSUS, NOT INTRACTABLE: ICD-10-CM

## 2024-11-04 RX ORDER — PROPRANOLOL HYDROCHLORIDE 60 MG/1
60 CAPSULE, EXTENDED RELEASE ORAL DAILY
Qty: 90 CAPSULE | Refills: 0 | Status: SHIPPED | OUTPATIENT
Start: 2024-11-04

## 2024-11-05 LAB
LABORATORY COMMENT REPORT: NORMAL
PATH REPORT.FINAL DX SPEC: NORMAL
PATH REPORT.GROSS SPEC: NORMAL
PATH REPORT.RELEVANT HX SPEC: NORMAL
PATH REPORT.TOTAL CANCER: NORMAL

## 2024-11-11 ENCOUNTER — APPOINTMENT (OUTPATIENT)
Dept: OBSTETRICS AND GYNECOLOGY | Facility: CLINIC | Age: 24
End: 2024-11-11
Payer: COMMERCIAL

## 2024-11-11 VITALS — SYSTOLIC BLOOD PRESSURE: 104 MMHG | DIASTOLIC BLOOD PRESSURE: 72 MMHG

## 2024-11-11 DIAGNOSIS — Z30.09 UNWANTED FERTILITY: Primary | ICD-10-CM

## 2024-11-11 DIAGNOSIS — N97.0 OLIGO-OVULATION: ICD-10-CM

## 2024-11-11 PROCEDURE — 99024 POSTOP FOLLOW-UP VISIT: CPT | Performed by: OBSTETRICS & GYNECOLOGY

## 2024-11-11 RX ORDER — MEDROXYPROGESTERONE ACETATE 10 MG/1
10 TABLET ORAL DAILY
Qty: 10 TABLET | Refills: 0 | Status: SHIPPED | OUTPATIENT
Start: 2024-11-11 | End: 2025-11-11

## 2024-11-11 ASSESSMENT — PATIENT HEALTH QUESTIONNAIRE - PHQ9
SUM OF ALL RESPONSES TO PHQ9 QUESTIONS 1 & 2: 0
2. FEELING DOWN, DEPRESSED OR HOPELESS: NOT AT ALL
1. LITTLE INTEREST OR PLEASURE IN DOING THINGS: NOT AT ALL

## 2024-11-11 NOTE — PROGRESS NOTES
GYN PROGRESS NOTE          CC:   Postop check    HPI:  Nancy Jose is here  for postop check.  Patient answers are not available for this visit.  HPI       Post-op     Additional comments: Est pt             Comments    Post op to bilateral salpingectomy  Bleeding since 10/03/2024  Started clotting, then bright red now just bleeding no pain            Last edited by Shaye Cunningham MA on 11/11/2024  8:31 AM.            ROS:  GEN - no fevers or chills  RESP - no SOB or cough  GYN - no AUB or pain  GI - normal BMs  URO - normal voids      HISTORY:  Past Medical History:   Diagnosis Date    ADD (attention deficit disorder)     ADHD (attention deficit hyperactivity disorder)     Anemia     Angina pectoris     Anxiety     Chronic pain disorder     COVID-19     VACCINATED    Dizziness     Dysfunctional uterine bleeding     irregular    Encounter for pregnancy test, result positive ([facility]) 11/01/2021    Positive urine pregnancy test    Encounter for screening for respiratory tuberculosis 02/07/2020    Tuberculosis screening    Encounter for supervision of normal pregnancy, unspecified, unspecified trimester 07/30/2020    Supervision of normal pregnancy    GERD (gastroesophageal reflux disease)     Hyperlipidemia     Less than 8 weeks gestation of pregnancy ([facility]) 06/29/2020    Less than 8 weeks gestation of pregnancy    Migraines     Obesity     OCD (obsessive compulsive disorder)     Personal history of other specified conditions 06/05/2020    History of abdominal pain    Postpartum depression 11/25/2019    Postpartum depression associated with first pregnancy    Pregnant ([facility])     Had 3 kids    PTSD (post-traumatic stress disorder)     Skin disorder     eczema - follows with dermatology and improved    Snores     Vomiting of pregnancy, unspecified 07/30/2020    Nausea/vomiting in pregnancy    Wears dentures     Wears dentures     Wears glasses      Past Surgical History:   Procedure Laterality Date      SECTION, LOW TRANSVERSE      DENTAL SURGERY      ALL TEETH REMOVED    SALPINGECTOMY Bilateral 10/29/2024     Social History     Socioeconomic History    Marital status:      Spouse name: Not on file    Number of children: Not on file    Years of education: Not on file    Highest education level: Not on file   Occupational History    Not on file   Tobacco Use    Smoking status: Every Day     Types: Cigarettes    Smokeless tobacco: Never   Vaping Use    Vaping status: Never Used   Substance and Sexual Activity    Alcohol use: Never     Comment: occ.    Drug use: Never    Sexual activity: Yes     Partners: Male     Birth control/protection: Female Sterilization     Comment: LMP  10/3/24   Other Topics Concern    Not on file   Social History Narrative    Not on file     Social Drivers of Health     Financial Resource Strain: Not on file   Food Insecurity: Not on file   Transportation Needs: Not on file   Physical Activity: Not on file   Stress: Not on file   Social Connections: Not on file   Intimate Partner Violence: Not on file   Housing Stability: Not on file     Cancer-related family history includes Cancer in her mother, sister, sister, sister, and sister; Colon cancer in her father's brother.       PHYSICAL EXAM:  /72   LMP 10/03/2024   GEN:  A&O, NAD  ABD  NT/ND, soft, no palpable masses  INCISION:  port site(s) healing well, no separation or erythema or discharge from wound  PSYCH:  normal affect, non-anxious    Order Name Source Comment Collection Info Order Time   VERAB/VERIFY ABORH Blood, Venous **This is for confirming/verifying history of ABORh on file for transfusion of blood products. If this is not for transfusion, please order an ABO/RH [[id number]]. If you have any questions or unsure what to order, please call the blood bank.** Collected By: Wendy Drake RN 10/29/2024  7:48 AM     Release result to [facility]   Immediate        SURGICAL PATHOLOGY EXAM FALLOPIAN TUBE SALPINGECTOMY  LEFT AND RIGHT Pre-op diagnosis:  Unwanted fertility [Z30.09] Collected By: Babak Guardado MD 10/29/2024 10:23 AM         IMPRESSION/PLAN:    S/p bs    History of oligoovulatory cycles with spotting    Doing well postoperatively, released from postoperative care.      Intraoperative events and findings reviewed with patient. Results of pathology--benign--reviewed with patient.  Surgical photos labelled, reviewed, and given to patient.    F/U PRN, or when next next preventative GYN exam due.      Babak Guardado MD

## 2024-12-05 DIAGNOSIS — E55.9 VITAMIN D DEFICIENCY: ICD-10-CM

## 2024-12-12 RX ORDER — ACETAMINOPHEN 500 MG
TABLET ORAL DAILY
Qty: 30 CAPSULE | Refills: 3 | Status: SHIPPED | OUTPATIENT
Start: 2024-12-12

## 2024-12-18 DIAGNOSIS — F41.9 ANXIETY: ICD-10-CM

## 2024-12-18 DIAGNOSIS — F32.A DEPRESSION, UNSPECIFIED DEPRESSION TYPE: ICD-10-CM

## 2024-12-18 RX ORDER — FLUOXETINE HYDROCHLORIDE 20 MG/1
20 CAPSULE ORAL DAILY
Qty: 30 CAPSULE | Refills: 5 | Status: SHIPPED | OUTPATIENT
Start: 2024-12-18

## 2024-12-18 NOTE — TELEPHONE ENCOUNTER
Rx Prozac 20 mg #30 p.o. 1 daily refill x 5.  May increase current Prozac to 2 capsules daily until they are used up.

## 2024-12-18 NOTE — TELEPHONE ENCOUNTER
I take 10 MG Prozac and I don't feel 10 MG is working right for me anymore am I able to try 20 MG or Find a different medication that'll work better ? I still have bad anxiety, OCD and social anxiety. I need to find the right help thank you

## 2025-01-17 DIAGNOSIS — F41.9 ANXIETY: ICD-10-CM

## 2025-01-17 DIAGNOSIS — F32.A DEPRESSION, UNSPECIFIED DEPRESSION TYPE: ICD-10-CM

## 2025-01-17 RX ORDER — FLUOXETINE 10 MG/1
10 CAPSULE ORAL DAILY
Qty: 30 CAPSULE | Refills: 3 | Status: SHIPPED | OUTPATIENT
Start: 2025-01-17

## 2025-01-29 ENCOUNTER — APPOINTMENT (OUTPATIENT)
Dept: PRIMARY CARE | Facility: CLINIC | Age: 25
End: 2025-01-29
Payer: COMMERCIAL

## 2025-02-02 DIAGNOSIS — G43.109 MIGRAINE WITH AURA AND WITHOUT STATUS MIGRAINOSUS, NOT INTRACTABLE: ICD-10-CM

## 2025-02-03 RX ORDER — PROPRANOLOL HYDROCHLORIDE 60 MG/1
60 CAPSULE, EXTENDED RELEASE ORAL DAILY
Qty: 90 CAPSULE | Refills: 0 | Status: SHIPPED | OUTPATIENT
Start: 2025-02-03

## 2025-02-24 ENCOUNTER — HOSPITAL ENCOUNTER (OUTPATIENT)
Dept: ORTHOPEDIC SURGERY | Age: 25
Discharge: HOME | End: 2025-02-26
Payer: COMMERCIAL

## 2025-02-24 ENCOUNTER — OFFICE VISIT (OUTPATIENT)
Dept: ORTHOPEDIC SURGERY | Age: 25
End: 2025-02-24
Payer: COMMERCIAL

## 2025-02-24 VITALS
WEIGHT: 230 LBS | DIASTOLIC BLOOD PRESSURE: 90 MMHG | HEIGHT: 68 IN | OXYGEN SATURATION: 99 % | HEART RATE: 90 BPM | SYSTOLIC BLOOD PRESSURE: 118 MMHG | BODY MASS INDEX: 34.86 KG/M2

## 2025-02-24 DIAGNOSIS — S63.502A SPRAIN OF LEFT WRIST, INITIAL ENCOUNTER: ICD-10-CM

## 2025-02-24 DIAGNOSIS — M25.532 LEFT WRIST PAIN: Primary | ICD-10-CM

## 2025-02-24 DIAGNOSIS — M25.532 LEFT WRIST PAIN: ICD-10-CM

## 2025-02-24 PROCEDURE — 1036F TOBACCO NON-USER: CPT | Performed by: STUDENT IN AN ORGANIZED HEALTH CARE EDUCATION/TRAINING PROGRAM

## 2025-02-24 PROCEDURE — 73110 X-RAY EXAM OF WRIST: CPT

## 2025-02-24 PROCEDURE — G8427 DOCREV CUR MEDS BY ELIG CLIN: HCPCS | Performed by: STUDENT IN AN ORGANIZED HEALTH CARE EDUCATION/TRAINING PROGRAM

## 2025-02-24 PROCEDURE — 99203 OFFICE O/P NEW LOW 30 MIN: CPT | Performed by: STUDENT IN AN ORGANIZED HEALTH CARE EDUCATION/TRAINING PROGRAM

## 2025-02-24 PROCEDURE — G8419 CALC BMI OUT NRM PARAM NOF/U: HCPCS | Performed by: STUDENT IN AN ORGANIZED HEALTH CARE EDUCATION/TRAINING PROGRAM

## 2025-02-24 SDOH — HEALTH STABILITY: PHYSICAL HEALTH: ON AVERAGE, HOW MANY DAYS PER WEEK DO YOU ENGAGE IN MODERATE TO STRENUOUS EXERCISE (LIKE A BRISK WALK)?: 2 DAYS

## 2025-02-24 SDOH — HEALTH STABILITY: PHYSICAL HEALTH: ON AVERAGE, HOW MANY MINUTES DO YOU ENGAGE IN EXERCISE AT THIS LEVEL?: 30 MIN

## 2025-02-24 NOTE — PROGRESS NOTES
Subjective:      HPI:: Chula Mosqueda is a 24 y.o. female who presents today for evaluation of left wrist pain after a fall onto an outstretched hand about 3 weeks ago.  She complains of pain in the ulnar side of the wrist near the ulnar styloid.  Pain is worse with resting that area down onto a hard surface.  She denies any pain throughout the remainder the wrist.  Has not tried any immobilization.  She denies any neurologic symptoms.    Past Medical History:   Diagnosis Date    Anxiety     BMI (body mass index), pediatric, 85% to less than 95% for age 2014    Chronic hypertension affecting pregnancy     Class 1 obesity due to excess calories without serious comorbidity with body mass index (BMI) of 33.0 to 33.9 in adult 2019    Depression     hx of depression    Generalized abdominal pain 2017    Headache(784.0)     History of depression 2013    Hypertension      labor third trimester with  delivery third trimester 2019     Past Surgical History:   Procedure Laterality Date     SECTION N/A 10/24/2022     SECTION performed by Chuy Che DO at [facility]    TOOTH EXTRACTION      WISDOM TOOTH EXTRACTION       Social History     Socioeconomic History    Marital status:      Spouse name: Not on file    Number of children: Not on file    Years of education: Not on file    Highest education level: Not on file   Occupational History    Not on file   Tobacco Use    Smoking status: Former     Passive exposure: Current    Smokeless tobacco: Never   Vaping Use    Vaping status: Never Used   Substance and Sexual Activity    Alcohol use: Not Currently    Drug use: No    Sexual activity: Yes     Partners: Male   Other Topics Concern    Not on file   Social History Narrative    ** Merged History Encounter **          Social Determinants of Health     Financial Resource Strain: Not on file   Food Insecurity: Not on file   Transportation Needs: Not on

## 2025-03-25 ASSESSMENT — PROMIS GLOBAL HEALTH SCALE
RATE_AVERAGE_FATIGUE: MODERATE
RATE_SOCIAL_SATISFACTION: GOOD
RATE_SOCIAL_SATISFACTION: GOOD
RATE_MENTAL_HEALTH: GOOD
CARRYOUT_SOCIAL_ACTIVITIES: GOOD
RATE_GENERAL_HEALTH: GOOD
RATE_PHYSICAL_HEALTH: FAIR
RATE_AVERAGE_PAIN: 5
RATE_AVERAGE_FATIGUE: MODERATE
EMOTIONAL_PROBLEMS: OFTEN
RATE_AVERAGE_PAIN: 5
RATE_QUALITY_OF_LIFE: GOOD
RATE_PHYSICAL_HEALTH: FAIR
RATE_GENERAL_HEALTH: GOOD
RATE_QUALITY_OF_LIFE: GOOD
CARRYOUT_SOCIAL_ACTIVITIES: GOOD
EMOTIONAL_PROBLEMS: OFTEN
CARRYOUT_PHYSICAL_ACTIVITIES: MOSTLY
RATE_MENTAL_HEALTH: GOOD
CARRYOUT_PHYSICAL_ACTIVITIES: MOSTLY

## 2025-03-26 ENCOUNTER — APPOINTMENT (OUTPATIENT)
Dept: PRIMARY CARE | Facility: CLINIC | Age: 25
End: 2025-03-26
Payer: COMMERCIAL

## 2025-03-28 DIAGNOSIS — E78.2 MIXED HYPERLIPIDEMIA: ICD-10-CM

## 2025-03-28 DIAGNOSIS — E66.01 CLASS 2 SEVERE OBESITY DUE TO EXCESS CALORIES WITH SERIOUS COMORBIDITY AND BODY MASS INDEX (BMI) OF 37.0 TO 37.9 IN ADULT: ICD-10-CM

## 2025-03-28 DIAGNOSIS — E66.812 CLASS 2 SEVERE OBESITY DUE TO EXCESS CALORIES WITH SERIOUS COMORBIDITY AND BODY MASS INDEX (BMI) OF 37.0 TO 37.9 IN ADULT: ICD-10-CM

## 2025-03-28 DIAGNOSIS — E55.9 VITAMIN D DEFICIENCY: ICD-10-CM

## 2025-04-03 DIAGNOSIS — E55.9 VITAMIN D DEFICIENCY: ICD-10-CM

## 2025-04-03 RX ORDER — ACETAMINOPHEN 500 MG
2000 TABLET ORAL DAILY
Qty: 30 CAPSULE | Refills: 0 | Status: SHIPPED | OUTPATIENT
Start: 2025-04-03

## 2025-04-07 ENCOUNTER — APPOINTMENT (OUTPATIENT)
Dept: NUTRITION | Facility: HOSPITAL | Age: 25
End: 2025-04-07
Payer: COMMERCIAL

## 2025-04-16 ENCOUNTER — APPOINTMENT (OUTPATIENT)
Dept: PRIMARY CARE | Facility: CLINIC | Age: 25
End: 2025-04-16
Payer: COMMERCIAL

## 2025-04-21 ASSESSMENT — ENCOUNTER SYMPTOMS
BACK PAIN: 1
SORE THROAT: 0
DIARRHEA: 1
FREQUENCY: 1
ANOREXIA: 0
DYSURIA: 0
ABDOMINAL PAIN: 1
HEMATURIA: 0
FLANK PAIN: 0
NAUSEA: 1
CHILLS: 0
CONSTIPATION: 0
VOMITING: 0
HEADACHES: 1
FEVER: 0

## 2025-04-24 ENCOUNTER — APPOINTMENT (OUTPATIENT)
Dept: PRIMARY CARE | Facility: CLINIC | Age: 25
End: 2025-04-24
Payer: COMMERCIAL

## 2025-04-24 DIAGNOSIS — M25.532 CHRONIC PAIN OF LEFT WRIST: ICD-10-CM

## 2025-04-24 DIAGNOSIS — G89.29 CHRONIC PAIN OF LEFT WRIST: ICD-10-CM

## 2025-04-28 ENCOUNTER — APPOINTMENT (OUTPATIENT)
Dept: OBSTETRICS AND GYNECOLOGY | Facility: CLINIC | Age: 25
End: 2025-04-28
Payer: COMMERCIAL

## 2025-04-28 VITALS
HEART RATE: 80 BPM | HEIGHT: 68 IN | SYSTOLIC BLOOD PRESSURE: 130 MMHG | BODY MASS INDEX: 40.25 KG/M2 | WEIGHT: 265.6 LBS | DIASTOLIC BLOOD PRESSURE: 83 MMHG

## 2025-04-28 DIAGNOSIS — N89.8 VAGINAL DISCHARGE: ICD-10-CM

## 2025-04-28 DIAGNOSIS — N92.6 IRREGULAR MENSES: Primary | ICD-10-CM

## 2025-04-28 DIAGNOSIS — R63.5 WEIGHT GAIN: ICD-10-CM

## 2025-04-28 PROCEDURE — 3008F BODY MASS INDEX DOCD: CPT | Performed by: ADVANCED PRACTICE MIDWIFE

## 2025-04-28 PROCEDURE — 99214 OFFICE O/P EST MOD 30 MIN: CPT | Performed by: ADVANCED PRACTICE MIDWIFE

## 2025-04-28 NOTE — PROGRESS NOTES
"GYNECOLOGY PROGRESS NOTE          CC:   Patient here due to thinking she was due for her annual. Her last pap was done 9/2024. Patient made aware she is not due for her annual yet. She would like STI testing. C/O clear to yellow discharge. No odor or itching. No change in partners. Patient is also concerned about weight gain. Last visit to the provider she states she weighed 245. Today's weight is 265.6. She states she wants something to help with her weight loss and she was wondering if PCOS was the cause of her weight gain, discharge, and irregular menses. Patient will have regular menses but then no menses for 2-5 months, this has occurred for years.  She had a tubal done 2024.   Chief Complaint   Patient presents with    Vaginal Discharge     Patient presents for vaginal discharge.          HPI:  Nancy Jose is here with new complaint of discharge and irregular or no menses.      ROS:  GYN - see HPI        PHYSICAL EXAM:  /83 (BP Location: Left arm, Patient Position: Sitting, BP Cuff Size: Large adult)   Pulse 80   Ht 1.727 m (5' 8\")   Wt 120 kg (265 lb 9.6 oz)   LMP 02/14/2025   BMI 40.38 kg/m²   GEN:  A&O, NAD  HEENT:  head HC/AT, no visible goiter  Physical Exam  Genitourinary:      Right Labia: No rash, tenderness or lesions.     Left Labia: No tenderness, lesions or rash.       PSYCH:  normal affect, non-anxious      IMPRESSION/PLAN:  A: no discharge noted with exam. No redness. Irregular menses to no menses for 2-5 months. BMI 40.38. Hx of PCOS per patient.  Plan: 1. Vaginal cx. 2. PCOS labs. 3. Patient to discuss weight loss options with PCP. 4. STI sent per patient request. 5. If labs wnl then consider sonogram to check cyst/follicle production. 6. Consider birth control for regulation. She had a tubal done but she needs menstrual regulation now.   Problem List Items Addressed This Visit    None  Visit Diagnoses         Irregular menses    -  Primary    Relevant Orders    FSH & LH    " Testosterone,Free and Total    TSH with reflex to Free T4 if abnormal    Glucose, Fasting      Vaginal discharge        Relevant Orders    C. trachomatis / N. gonorrhoeae, Amplified, Urogenital    Trichomonas vaginalis, Amplified    Vaginitis Gram Stain For Bacterial Vaginosis + Yeast      Weight gain        Relevant Orders    FSH & LH    Testosterone,Free and Total    TSH with reflex to Free T4 if abnormal    Glucose, Fasting                 DARYN Novoa          Answers submitted by the patient for this visit:  Female Genital Questionnaire (Submitted on 4/21/2025)  Chief Complaint: Female genitourinary complaint  genital itching: No  genital lesions: No  genital odor: Yes  genital rash: No  missed menses: No  pelvic pain: Yes  vaginal bleeding: No  vaginal discharge: Yes  Chronicity: new  Onset: 1 to 4 weeks ago  Frequency: daily  Progression since onset: unchanged  Pain severity: mild  Affected side: both  Pregnant now?: No  abdominal pain: Yes  anorexia: No  back pain: Yes  chills: No  constipation: No  diarrhea: Yes  discolored urine: No  dysuria: No  fever: No  flank pain: No  frequency: Yes  headaches: Yes  hematuria: No  joint pain: No  joint swelling: No  nausea: Yes  painful intercourse: Yes  rash: No  sore throat: No  urgency: No  vomiting: No  Aggravated by: intercourse  Sexual activity: sexually active  Partner with STD symptoms: no  Birth control: vasectomy  Menstrual history: irregular  Discharge characteristics: clear, copious, malodorous, milky, mucoid, thick, white  Passing clots?: No

## 2025-04-29 LAB
BV SCORE VAG QL: NORMAL
C TRACH RRNA SPEC QL NAA+PROBE: NOT DETECTED
N GONORRHOEA RRNA SPEC QL NAA+PROBE: NOT DETECTED
QUEST GC CT AMPLIFIED (ALWAYS MESSAGE): NORMAL
T VAGINALIS RRNA SPEC QL NAA+PROBE: NOT DETECTED

## 2025-05-01 ENCOUNTER — APPOINTMENT (OUTPATIENT)
Dept: ORTHOPEDIC SURGERY | Facility: CLINIC | Age: 25
End: 2025-05-01
Payer: COMMERCIAL

## 2025-05-01 DIAGNOSIS — M25.532 CHRONIC PAIN OF LEFT WRIST: ICD-10-CM

## 2025-05-01 DIAGNOSIS — S63.502A WRIST SPRAIN, LEFT, INITIAL ENCOUNTER: ICD-10-CM

## 2025-05-01 DIAGNOSIS — G89.29 CHRONIC PAIN OF LEFT WRIST: ICD-10-CM

## 2025-05-01 PROCEDURE — L3908 WHO COCK-UP NONMOLDE PRE OTS: HCPCS | Performed by: STUDENT IN AN ORGANIZED HEALTH CARE EDUCATION/TRAINING PROGRAM

## 2025-05-01 PROCEDURE — 20605 DRAIN/INJ JOINT/BURSA W/O US: CPT | Performed by: STUDENT IN AN ORGANIZED HEALTH CARE EDUCATION/TRAINING PROGRAM

## 2025-05-01 PROCEDURE — 99213 OFFICE O/P EST LOW 20 MIN: CPT | Performed by: STUDENT IN AN ORGANIZED HEALTH CARE EDUCATION/TRAINING PROGRAM

## 2025-05-01 RX ORDER — LIDOCAINE HYDROCHLORIDE 10 MG/ML
1 INJECTION, SOLUTION INFILTRATION; PERINEURAL
Status: COMPLETED | OUTPATIENT
Start: 2025-05-01 | End: 2025-05-01

## 2025-05-01 RX ADMIN — LIDOCAINE HYDROCHLORIDE 1 ML: 10 INJECTION, SOLUTION INFILTRATION; PERINEURAL at 10:18

## 2025-05-01 NOTE — PROGRESS NOTES
HPI:  Presents to discuss left wrist concerns.  Complains of ulnar sided wrist pain.  Symptoms have been present since fall 5 months ago.   Treatment to date has included bracing and anti-inflammatories.  The symptoms are improved by rest and exacerbated by activity, particularly pronation supination motion.       The patient's past medical history, family history, social history, and review of systems were reviewed. History is otherwise negative except as stated in the HPI.    Review of Systems   GENERAL: Negative for malaise, significant weight loss, fever  MUSCULOSKELETAL: see HPI  NEURO:  Negative    Physical Examination:   General: Alert and oriented to person, place, and time.  No acute distress and breathing comfortably: Pleasant and cooperative with examination.  HEENT: Head is normocephalic and atraumatic.  Neck: Supple, no visible swelling.  Cardiovascular: No palpable tachycardia  Lungs: No audible wheezing or labored breathing  Abdomen: Nondistended. Bilateral upper limbs have equal and intact peripheral pulses. Skin does not demonstrate any rashes or lesions. Shoulders and elbows have pain free range of motion.  Negative carpal tunnel compression testing and Phalen testing on the left.  Intact sensation to light touch in the radial 3 digits.   tenderness to palpation fovea, DRUJ mildly tender to stress.   Full wrist ROM, no radial sided TTP, no TTP LT/pisiform/hook of hamate.    X-Ray: X-ray 3 views of the left wrist from 2/24/2025 reviewed.  Reveal no acute osseous abnormalities.    M Inj/Asp: L ulnocarpal on 5/1/2025 10:18 AM  Indications: pain  Details: 24 G needle, volar approach  Medications: 10 mg triamcinolone acetonide 10 mg/mL; 1 mL lidocaine 10 mg/mL (1 %)  Outcome: tolerated well, no immediate complications  Procedure, treatment alternatives, risks and benefits explained, specific risks discussed. Consent was given by the patient. Immediately prior to procedure a time out was called to  verify the correct patient, procedure, equipment, support staff and site/side marked as required. Patient was prepped and draped in the usual sterile fashion.             Assessment: Patient with left wrist pain consistent with TFCC etiology.  This started after a fall 5 months ago.  Recommend cortisone injection today along with bracing.  We have provided her a wrist pro to be worn full-time..  Discussed at length with patient today, with the length of symptoms and previous bracing we will proceed with cortisone injection today.    Plan:   Injection.  I explained the risks and benefits of an injection. Specifically, I reviewed the risks of injection, which include, but are not limited to, infection, bleeding, nerve injury, pain, steroid flare, glycemic alteration, subcutaneous fat atrophy, skin hypopigmentation, soft tissue damage, and incomplete symptom relief. At this time, the patient would like to proceed with an injection. After obtaining consent, I injected a 1mL combination of Kenalog and 1% lidocaine into TFCC, sterile technique. The injection site was dressed, and the patient tolerated the injection well. I am hopeful that this injection will serve diagnostic, prognostic, and therapeutic purposes. Finally, I have emphasized patience, as any benefit may take several weeks to manifest.  Follow up 2 months    Gigi Reid PA-C      In a face to face encounter, I performed a history and physical examination, discussed pertinent diagnostic studies if indicated, and discussed diagnosis and management strategies with both the patient and the mid-level provider. I reviewed the mid-level's note and agree with the documented findings and plan of care. Addendums and additions to note have been added as appropriate.     Tatianna Seymour MD

## 2025-05-02 LAB
FSH SERPL-ACNC: 7.1 MIU/ML
GLUCOSE P FAST SERPL-MCNC: 79 MG/DL (ref 65–99)
LH SERPL-ACNC: 17.2 MIU/ML
TESTOST FREE SERPL-MCNC: NORMAL PG/ML
TESTOST SERPL-MCNC: NORMAL NG/DL
TSH SERPL-ACNC: 1.42 MIU/L

## 2025-05-05 ENCOUNTER — APPOINTMENT (OUTPATIENT)
Dept: PRIMARY CARE | Facility: CLINIC | Age: 25
End: 2025-05-05
Payer: COMMERCIAL

## 2025-05-05 DIAGNOSIS — N92.6 IRREGULAR MENSES: Primary | ICD-10-CM

## 2025-05-05 LAB
FSH SERPL-ACNC: 7.1 MIU/ML
GLUCOSE P FAST SERPL-MCNC: 79 MG/DL (ref 65–99)
LH SERPL-ACNC: 17.2 MIU/ML
TESTOST FREE SERPL-MCNC: 10.7 PG/ML (ref 0.1–6.4)
TESTOST SERPL-MCNC: 66 NG/DL (ref 2–45)
TSH SERPL-ACNC: 1.42 MIU/L

## 2025-05-06 ENCOUNTER — OFFICE VISIT (OUTPATIENT)
Dept: PRIMARY CARE | Facility: CLINIC | Age: 25
End: 2025-05-06
Payer: COMMERCIAL

## 2025-05-06 VITALS
BODY MASS INDEX: 40.11 KG/M2 | DIASTOLIC BLOOD PRESSURE: 78 MMHG | OXYGEN SATURATION: 99 % | SYSTOLIC BLOOD PRESSURE: 112 MMHG | HEART RATE: 80 BPM | WEIGHT: 263.8 LBS

## 2025-05-06 DIAGNOSIS — F33.1 MODERATE EPISODE OF RECURRENT MAJOR DEPRESSIVE DISORDER: ICD-10-CM

## 2025-05-06 DIAGNOSIS — F32.A DEPRESSION, UNSPECIFIED DEPRESSION TYPE: ICD-10-CM

## 2025-05-06 DIAGNOSIS — G43.109 MIGRAINE WITH AURA AND WITHOUT STATUS MIGRAINOSUS, NOT INTRACTABLE: ICD-10-CM

## 2025-05-06 DIAGNOSIS — E66.812 CLASS 2 SEVERE OBESITY DUE TO EXCESS CALORIES WITH SERIOUS COMORBIDITY AND BODY MASS INDEX (BMI) OF 37.0 TO 37.9 IN ADULT: Primary | ICD-10-CM

## 2025-05-06 DIAGNOSIS — E66.01 CLASS 2 SEVERE OBESITY DUE TO EXCESS CALORIES WITH SERIOUS COMORBIDITY AND BODY MASS INDEX (BMI) OF 37.0 TO 37.9 IN ADULT: Primary | ICD-10-CM

## 2025-05-06 DIAGNOSIS — E55.9 VITAMIN D DEFICIENCY: ICD-10-CM

## 2025-05-06 DIAGNOSIS — Z86.59 HISTORY OF OCD (OBSESSIVE COMPULSIVE DISORDER): ICD-10-CM

## 2025-05-06 DIAGNOSIS — F41.9 ANXIETY: ICD-10-CM

## 2025-05-06 RX ORDER — PROPRANOLOL HYDROCHLORIDE 60 MG/1
60 CAPSULE, EXTENDED RELEASE ORAL DAILY
Qty: 90 CAPSULE | Refills: 1 | Status: SHIPPED | OUTPATIENT
Start: 2025-05-06

## 2025-05-06 RX ORDER — ACETAMINOPHEN 500 MG
50 TABLET ORAL DAILY
Qty: 90 CAPSULE | Refills: 1 | Status: SHIPPED | OUTPATIENT
Start: 2025-05-06

## 2025-05-06 RX ORDER — FLUOXETINE 10 MG/1
10 CAPSULE ORAL DAILY
Qty: 90 CAPSULE | Refills: 1 | Status: SHIPPED | OUTPATIENT
Start: 2025-05-06

## 2025-05-06 RX ORDER — GALCANEZUMAB 120 MG/ML
120 INJECTION, SOLUTION SUBCUTANEOUS
Qty: 1 EACH | Refills: 5 | Status: SHIPPED | OUTPATIENT
Start: 2025-05-06

## 2025-05-06 RX ORDER — PHENTERMINE HYDROCHLORIDE 37.5 MG/1
37.5 TABLET ORAL
Qty: 30 TABLET | Refills: 0 | Status: SHIPPED | OUTPATIENT
Start: 2025-05-06 | End: 2025-06-05

## 2025-05-06 ASSESSMENT — ENCOUNTER SYMPTOMS
OCCASIONAL FEELINGS OF UNSTEADINESS: 0
DEPRESSION: 0
LOSS OF SENSATION IN FEET: 0

## 2025-05-06 NOTE — PROGRESS NOTES
Subjective   Patient ID: Nancy Joes is a 24 y.o. female who presents for Establish Care (Pt is in need to discuss PCO'S with PCP, also would like to discuss weight management.  ).  History of Present Illness  Nancy Jose is a 24 year old female who presents for a medication refill.    She is seeking a refill for her migraine medication, Emgality, which she takes as a once-a-month injection. She is currently two days overdue for her dose and is concerned about the recurrence of her migraines. Emgality has been effective in preventing her migraines, which she used to experience frequently. She also uses propranolol for migraine prevention.    She requires refills for her other medications, including Prozac, which she takes daily for anxiety and depression. Her anxiety and stress are stable on Prozac.    She is experiencing difficulty with weight management, reporting a current weight of 266 pounds and an inability to lose weight despite efforts. She is frustrated with her weight gain.    She has three children, aged six, four, and two, and reports that they are doing well.    Review of Systems  12 Systems have been reviewed as follows.  Constitutional: Fever, weight gain, weight loss, appetite change, night sweats, fatigue, chills.  Eyes : blurry, double vision, vision, loss, tearing, redness, pain, sensitivity to light, glaucoma.  Ears, nose, mouth, and throat: Hearing loss, ringing in the ears, ear pain, nasal congestion, nasal drainage, nosebleeds, mouth, throat, irritation tooth problem.  Cardiovascular :chest pain, pressure, heart racing, palpitations, sweating, leg swelling, high or low blood pressure  Pulmonary: Cough, yellow or green sputum, blood and sputum, shortness of breath, wheezing  Gastrointestinal: Nausea, vomiting, diarrhea, constipation, pain, blood in stool, or vomitus, heartburn, difficulty swallowing  Genitourinary: incontinence, abnormal bleeding, abnormal discharge, urinary  frequency, urinary hesitancy, pain, impotence sexual problem, infection, urinary retention  Musculoskeletal: Pain, stiffness, joint, redness or warmth, arthritis, back pain, weakness, muscle wasting, sprain or fracture  Neuro: Weight weakness, dizziness, change in voice, change in taste change in vision, change in hearing, loss, or change of sensation, trouble walking, balance problems coordination problems, shaking, speech problem  Endocrine , cold or heat intolerance, blood sugar problem, weight gain or loss missed periods hot flashes, sweats, change in body hair, change in libido, increased thirst, increased urination  Heme/lymph: Swelling, bleeding, problem anemia, bruising, enlarged lymph nodes  Allergic/immunologic: H. plus nasal drip, watery itchy eyes, nasal drainage, immunosuppressed  The above were reviewed and noted negative except as noted in HPI and Problem List.    Objective     /78   Pulse 80   Wt 120 kg (263 lb 12.8 oz)   LMP 02/14/2025   SpO2 99%   BMI 40.11 kg/m²      Physical Exam  MEASUREMENTS: Weight- 266.  Constitutional: Well developed, well nourished, alert and in no acute distress   Eyes: Normal external exam. Pupils equally round and reactive to light with normal accommodation and extraocular movements intact.  Neck: Supple, no lymphadenopathy or masses.   Cardiovascular: Regular rate and rhythm, normal S1 and S2, no murmurs, gallops, or rubs. Radial pulses normal. No peripheral edema.  Pulmonary: No respiratory distress, lungs clear to auscultation bilaterally. No wheezes, rhonchi, rales.  Abdomen: soft,non tender, non distended, without masses or HSM  Skin: Warm, well perfused, normal skin turgor and color.   Neurologic: Cranial nerves II-XII grossly intact.   Psychiatric: Mood calm and affect normal  Musculoskeletal: Moving all extremities without restriction  The above were reviewed and noted negative except as noted in HPI and Problem List.      Results           Assessment  & Plan  Obesity  Obesity with difficulty in weight management. Current weight is 266 lbs. Reports inability to lose weight despite efforts. Discussed the use of Adipex, a stimulant similar to Adderall, to aid in weight loss by suppressing appetite. Advised on dietary modifications including following the Weight Watchers diet, increasing intake of grains, fruits, and vegetables, and reducing fats, proteins, and sweets. Emphasized the importance of calorie counting and reducing intake by 500 calories per day to achieve weight loss of one pound per week. Explained that Adipex was previously limited to three months of use, but now can be used continuously.  - Prescribe Adipex for weight loss, to be taken once daily.  - Refer for nutritional consultation.    Depression  Depression is well-managed on current medication regimen. Reports feeling good with current management using Prozac for anxiety, stress, and depression.  - Prescribe Prozac with a 3-month refill.    Migraine  Migraine headaches are well-controlled with current treatment regimen. Uses Emgality once a month and propranolol daily for migraine prevention. Reports significant improvement in symptoms with Emgality.  - Prescribe Emgality for monthly use.  - Prescribe propranolol for daily use as migraine prophylaxis.    Vitamin D Deficiency  Requires vitamin D supplementation.  - Prescribe vitamin D3 supplementation.    Problem List Items Addressed This Visit       Class 2 severe obesity due to excess calories with serious comorbidity and body mass index (BMI) of 37.0 to 37.9 in adult - Primary    Relevant Medications    phentermine (Adipex-P) 37.5 mg tablet    Other Relevant Orders    Follow Up In [facility] - PCP - Established    Referral to Nutrition Services    Depression    Relevant Medications    FLUoxetine (PROzac) 10 mg capsule    Other Relevant Orders    Follow Up [facility] - PCP - Established    Follow Up [facility] - PCP - Established    History of OCD (obsessive compulsive disorder)    Relevant Orders    Follow Up [facility] - PCP - Established    Migraine with aura and without status migrainosus, not intractable    Relevant Medications    galcanezumab (Emgality Syringe) 120 mg/mL prefilled syringe    propranolol LA (Inderal LA) 60 mg 24 hr capsule    Other Relevant Orders    Follow Up In [facility] - PCP - Established    Vitamin D deficiency    Relevant Medications    cholecalciferol (Vitamin D-3) 50 mcg (2,000 units) capsule    Other Relevant Orders    Follow Up [facility] - PCP - Established    Anxiety    Relevant Medications    FLUoxetine (PROzac) 10 mg capsule    Other Relevant Orders    Follow Up In [facility] - PCP - Established    Adipex    BW next    Randy Mooney MD       This medical note was created with the assistance of artificial intelligence (AI) for documentation purposes. The content has been reviewed and confirmed by the healthcare provider for accuracy and completeness. Patient consented to the use of audio recording and use of AI during their visit.

## 2025-05-07 DIAGNOSIS — G43.109 MIGRAINE WITH AURA AND WITHOUT STATUS MIGRAINOSUS, NOT INTRACTABLE: Primary | ICD-10-CM

## 2025-05-08 ENCOUNTER — APPOINTMENT (OUTPATIENT)
Dept: RADIOLOGY | Facility: HOSPITAL | Age: 25
End: 2025-05-08
Payer: COMMERCIAL

## 2025-05-08 RX ORDER — ERENUMAB-AOOE 140 MG/ML
140 INJECTION, SOLUTION SUBCUTANEOUS
Qty: 140 ML | Refills: 2 | Status: SHIPPED | COMMUNITY
Start: 2025-05-08

## 2025-05-12 ENCOUNTER — PATIENT MESSAGE (OUTPATIENT)
Dept: PRIMARY CARE | Facility: CLINIC | Age: 25
End: 2025-05-12
Payer: COMMERCIAL

## 2025-05-12 ENCOUNTER — APPOINTMENT (OUTPATIENT)
Dept: RADIOLOGY | Facility: HOSPITAL | Age: 25
End: 2025-05-12
Payer: COMMERCIAL

## 2025-05-12 DIAGNOSIS — F41.9 ANXIETY: ICD-10-CM

## 2025-05-12 DIAGNOSIS — F32.A DEPRESSION, UNSPECIFIED DEPRESSION TYPE: ICD-10-CM

## 2025-05-13 RX ORDER — FLUOXETINE 10 MG/1
10 CAPSULE ORAL DAILY
Qty: 90 CAPSULE | Refills: 1 | Status: SHIPPED | OUTPATIENT
Start: 2025-05-13

## 2025-05-14 ENCOUNTER — APPOINTMENT (OUTPATIENT)
Dept: PRIMARY CARE | Facility: CLINIC | Age: 25
End: 2025-05-14
Payer: COMMERCIAL

## 2025-06-05 ENCOUNTER — APPOINTMENT (OUTPATIENT)
Dept: NUTRITION | Facility: CLINIC | Age: 25
End: 2025-06-05
Payer: COMMERCIAL

## 2025-06-05 ASSESSMENT — ENCOUNTER SYMPTOMS
COUGH: 0
SORE THROAT: 0
SHORTNESS OF BREATH: 0
NAIL CHANGES: 0
FEVER: 0
ANOREXIA: 0
RHINORRHEA: 0
DIARRHEA: 0
VOMITING: 0
FATIGUE: 0
EYE PAIN: 0

## 2025-06-06 ENCOUNTER — APPOINTMENT (OUTPATIENT)
Dept: SURGERY | Facility: CLINIC | Age: 25
End: 2025-06-06
Payer: COMMERCIAL

## 2025-06-06 ENCOUNTER — APPOINTMENT (OUTPATIENT)
Dept: PRIMARY CARE | Facility: CLINIC | Age: 25
End: 2025-06-06
Payer: COMMERCIAL

## 2025-06-09 DIAGNOSIS — E66.812 CLASS 2 SEVERE OBESITY DUE TO EXCESS CALORIES WITH SERIOUS COMORBIDITY AND BODY MASS INDEX (BMI) OF 37.0 TO 37.9 IN ADULT: ICD-10-CM

## 2025-06-09 DIAGNOSIS — E66.01 CLASS 2 SEVERE OBESITY DUE TO EXCESS CALORIES WITH SERIOUS COMORBIDITY AND BODY MASS INDEX (BMI) OF 37.0 TO 37.9 IN ADULT: ICD-10-CM

## 2025-06-11 RX ORDER — PHENTERMINE HYDROCHLORIDE 37.5 MG/1
37.5 TABLET ORAL
Qty: 30 TABLET | Refills: 0 | Status: SHIPPED | OUTPATIENT
Start: 2025-06-11 | End: 2025-07-11

## 2025-06-12 ENCOUNTER — APPOINTMENT (OUTPATIENT)
Dept: PRIMARY CARE | Facility: CLINIC | Age: 25
End: 2025-06-12
Payer: COMMERCIAL

## 2025-06-20 ENCOUNTER — APPOINTMENT (OUTPATIENT)
Dept: PRIMARY CARE | Facility: CLINIC | Age: 25
End: 2025-06-20
Payer: COMMERCIAL

## 2025-06-20 PROBLEM — E28.2 PCOS (POLYCYSTIC OVARIAN SYNDROME): Status: ACTIVE | Noted: 2025-06-20

## 2025-06-23 ENCOUNTER — APPOINTMENT (OUTPATIENT)
Dept: PRIMARY CARE | Facility: CLINIC | Age: 25
End: 2025-06-23
Payer: COMMERCIAL

## 2025-07-01 ENCOUNTER — APPOINTMENT (OUTPATIENT)
Dept: PRIMARY CARE | Facility: CLINIC | Age: 25
End: 2025-07-01
Payer: COMMERCIAL

## 2025-07-13 ASSESSMENT — PROMIS GLOBAL HEALTH SCALE
RATE_PHYSICAL_HEALTH: FAIR
EMOTIONAL_PROBLEMS: OFTEN
RATE_QUALITY_OF_LIFE: GOOD
CARRYOUT_PHYSICAL_ACTIVITIES: MOSTLY
RATE_AVERAGE_FATIGUE: MILD
RATE_SOCIAL_SATISFACTION: POOR
RATE_AVERAGE_PAIN: 5
RATE_GENERAL_HEALTH: FAIR
RATE_MENTAL_HEALTH: FAIR
CARRYOUT_SOCIAL_ACTIVITIES: GOOD

## 2025-07-17 ENCOUNTER — APPOINTMENT (OUTPATIENT)
Dept: PRIMARY CARE | Facility: CLINIC | Age: 25
End: 2025-07-17
Payer: COMMERCIAL

## 2025-07-23 ENCOUNTER — APPOINTMENT (OUTPATIENT)
Dept: PRIMARY CARE | Facility: CLINIC | Age: 25
End: 2025-07-23
Payer: COMMERCIAL

## 2025-07-24 ASSESSMENT — PROMIS GLOBAL HEALTH SCALE
RATE_MENTAL_HEALTH: GOOD
EMOTIONAL_PROBLEMS: OFTEN
RATE_GENERAL_HEALTH: GOOD
CARRYOUT_PHYSICAL_ACTIVITIES: MODERATELY
RATE_AVERAGE_FATIGUE: MODERATE
RATE_QUALITY_OF_LIFE: GOOD
CARRYOUT_SOCIAL_ACTIVITIES: GOOD
RATE_AVERAGE_PAIN: 5
RATE_PHYSICAL_HEALTH: FAIR
RATE_SOCIAL_SATISFACTION: FAIR

## 2025-07-25 ENCOUNTER — OFFICE VISIT (OUTPATIENT)
Dept: PRIMARY CARE | Facility: CLINIC | Age: 25
End: 2025-07-25
Payer: COMMERCIAL

## 2025-07-25 VITALS
BODY MASS INDEX: 37.01 KG/M2 | DIASTOLIC BLOOD PRESSURE: 82 MMHG | OXYGEN SATURATION: 98 % | HEART RATE: 64 BPM | WEIGHT: 243.4 LBS | SYSTOLIC BLOOD PRESSURE: 106 MMHG

## 2025-07-25 DIAGNOSIS — G43.109 MIGRAINE WITH AURA AND WITHOUT STATUS MIGRAINOSUS, NOT INTRACTABLE: Primary | ICD-10-CM

## 2025-07-25 DIAGNOSIS — B35.4 TINEA CORPORIS: ICD-10-CM

## 2025-07-25 DIAGNOSIS — E66.812 CLASS 2 SEVERE OBESITY DUE TO EXCESS CALORIES WITH SERIOUS COMORBIDITY AND BODY MASS INDEX (BMI) OF 37.0 TO 37.9 IN ADULT: ICD-10-CM

## 2025-07-25 DIAGNOSIS — F41.9 ANXIETY: ICD-10-CM

## 2025-07-25 DIAGNOSIS — E66.01 CLASS 2 SEVERE OBESITY DUE TO EXCESS CALORIES WITH SERIOUS COMORBIDITY AND BODY MASS INDEX (BMI) OF 37.0 TO 37.9 IN ADULT: ICD-10-CM

## 2025-07-25 DIAGNOSIS — E78.2 MIXED HYPERLIPIDEMIA: ICD-10-CM

## 2025-07-25 DIAGNOSIS — F32.A DEPRESSION, UNSPECIFIED DEPRESSION TYPE: ICD-10-CM

## 2025-07-25 DIAGNOSIS — N30.01 ACUTE CYSTITIS WITH HEMATURIA: ICD-10-CM

## 2025-07-25 PROCEDURE — 99214 OFFICE O/P EST MOD 30 MIN: CPT | Performed by: FAMILY MEDICINE

## 2025-07-25 RX ORDER — CLOTRIMAZOLE AND BETAMETHASONE DIPROPIONATE 10; .64 MG/G; MG/G
1 CREAM TOPICAL 2 TIMES DAILY
Qty: 45 G | Refills: 5 | Status: SHIPPED | OUTPATIENT
Start: 2025-07-25 | End: 2025-11-22

## 2025-07-25 RX ORDER — PHENTERMINE HYDROCHLORIDE 37.5 MG/1
37.5 TABLET ORAL
Qty: 30 TABLET | Refills: 0 | Status: SHIPPED | OUTPATIENT
Start: 2025-07-25 | End: 2025-08-24

## 2025-07-25 RX ORDER — NITROFURANTOIN 25; 75 MG/1; MG/1
100 CAPSULE ORAL 2 TIMES DAILY
Qty: 20 CAPSULE | Refills: 0 | Status: SHIPPED | OUTPATIENT
Start: 2025-07-25 | End: 2025-08-05

## 2025-07-25 ASSESSMENT — PATIENT HEALTH QUESTIONNAIRE - PHQ9
2. FEELING DOWN, DEPRESSED OR HOPELESS: NOT AT ALL
SUM OF ALL RESPONSES TO PHQ9 QUESTIONS 1 AND 2: 0
1. LITTLE INTEREST OR PLEASURE IN DOING THINGS: NOT AT ALL

## 2025-07-25 ASSESSMENT — ENCOUNTER SYMPTOMS
OCCASIONAL FEELINGS OF UNSTEADINESS: 0
LOSS OF SENSATION IN FEET: 0
DEPRESSION: 0

## 2025-07-25 NOTE — PROGRESS NOTES
Subjective   Patient ID: Nancy Jose is a 25 y.o. female who presents for Follow-up (Pt on Emgality for migraine, which she takes as a once-a-month injection. She is currently two days overdue for her dose and is concerned about the recurrence of her migraines. Emgality has been effective in preventing her migraines. //Yeast infection yellow discharge. //Pt c/o sweating. ).  History of Present Illness  The patient presents for weight management, anxiety and depression, migraines, possible urinary tract infection (UTI), and tinea cruris.    She reports a significant weight loss from 260 to 240 pounds, which she attributes to the use of Adipex. She has been on this medication for a month and has about a week's supply left. She missed a few doses but generally takes it consistently every morning.    She suspects a yeast infection or UTI due to the presence of yellowish discharge, occasional itching, and frequent urination. Additionally, she mentions excessive sweating, even in air-conditioned environments, which causes discomfort around her  scar and thighs. This discomfort is exacerbated by a rash that appears intermittently and causes itching.    Her migraines are well-managed with Emgality injections, which she finds more effective than Aimovig. She is pleased with the improvement in her condition and no longer feels constantly unwell due to headaches and migraines.    She continues to struggle with anxiety and depression, despite taking fluoxetine. An increase in dosage was previously attempted but resulted in adverse effects such as nausea and general malaise. Her sleep quality is poor, often going to bed around midnight or 1:00 AM. She feels overwhelmed by the responsibility of caring for her three children.    Social History:  Sleep: She reports poor sleep quality, often going to bed around midnight or 1:00 AM.    PAST SURGICAL HISTORY:    See Above  Review of Systems  12 Systems have  been reviewed as follows.  Constitutional: Fever, weight gain, weight loss, appetite change, night sweats, fatigue, chills.  Eyes : blurry, double vision, vision, loss, tearing, redness, pain, sensitivity to light, glaucoma.  Ears, nose, mouth, and throat: Hearing loss, ringing in the ears, ear pain, nasal congestion, nasal drainage, nosebleeds, mouth, throat, irritation tooth problem.  Cardiovascular :chest pain, pressure, heart racing, palpitations, sweating, leg swelling, high or low blood pressure  Pulmonary: Cough, yellow or green sputum, blood and sputum, shortness of breath, wheezing  Gastrointestinal: Nausea, vomiting, diarrhea, constipation, pain, blood in stool, or vomitus, heartburn, difficulty swallowing  Genitourinary: incontinence, abnormal bleeding, abnormal discharge, urinary frequency, urinary hesitancy, pain, impotence sexual problem, infection, urinary retention  Musculoskeletal: Pain, stiffness, joint, redness or warmth, arthritis, back pain, weakness, muscle wasting, sprain or fracture  Neuro: Weight weakness, dizziness, change in voice, change in taste change in vision, change in hearing, loss, or change of sensation, trouble walking, balance problems coordination problems, shaking, speech problem  Endocrine , cold or heat intolerance, blood sugar problem, weight gain or loss missed periods hot flashes, sweats, change in body hair, change in libido, increased thirst, increased urination  Heme/lymph: Swelling, bleeding, problem anemia, bruising, enlarged lymph nodes  Allergic/immunologic: H. plus nasal drip, watery itchy eyes, nasal drainage, immunosuppressed  The above were reviewed and noted negative except as noted in HPI and Problem List.    Objective     /82   Pulse 64   Wt 110 kg (243 lb 6.4 oz)   SpO2 98%   BMI 37.01 kg/m²      Physical Exam  General: No acute distress.  Skin: Rash noted under abdominal fold, consistent with tinea cruris.  Constitutional: Well developed, well  nourished, alert and in no acute distress   Eyes: Normal external exam. Pupils equally round and reactive to light with normal accommodation and extraocular movements intact.  Neck: Supple, no lymphadenopathy or masses.   Cardiovascular: Regular rate and rhythm, normal S1 and S2, no murmurs, gallops, or rubs. Radial pulses normal. No peripheral edema.  Pulmonary: No respiratory distress, lungs clear to auscultation bilaterally. No wheezes, rhonchi, rales.  Abdomen: soft,non tender, non distended, without masses or HSM  Skin: Warm, well perfused, normal skin turgor and color.   Neurologic: Cranial nerves II-XII grossly intact.   Psychiatric: Mood calm and affect normal  Musculoskeletal: Moving all extremities without restriction  The above were reviewed and noted negative except as noted in HPI and Problem List.      Results           Assessment & Plan  1. Tinea cruris.  - Symptoms suggest a fungal infection, likely exacerbated by heat and moisture.  - Physical examination revealed itching and rash in the affected areas.  - Lotrisone cream has been prescribed for application twice daily, with five refills provided.  - Advised to maintain personal hygiene by showering twice daily and using a hairdryer on a cool setting to dry the affected areas.    2. Urinary tract infection.  - Reports symptoms consistent with a UTI, including yellowish discharge and itching.  - Macrobid has been prescribed for a duration of 10 days.  - A urine test will be conducted during the next visit.    3. Anxiety and depression.  - Reports ongoing anxiety and depression despite current treatment with fluoxetine.  - Fluoxetine dosage will remain the same due to previous adverse effects at higher doses.  - Vraylar will be introduced for nightly administration to manage mood fluctuations and refractory depression.  - Advised to continue taking Prozac in the morning.    4. Migraines.  - Reports significant improvement in migraines with the monthly  Emgality injection.  - Will continue with the current treatment regimen.    5. Weight management.  - Has been on Adipex for one month and has lost approximately 20 pounds.  - Will continue with Adipex, and a prescription refill has been sent to the pharmacy.    Follow-up: The patient will follow up in 4 weeks.    Problem List Items Addressed This Visit       Class 2 severe obesity due to excess calories with serious comorbidity and body mass index (BMI) of 37.0 to 37.9 in adult    Relevant Medications    phentermine (Adipex-P) 37.5 mg tablet    Other Relevant Orders    Follow Up In [facility] - PCP - Established    Depression    Relevant Medications    phentermine (Adipex-P) 37.5 mg tablet    Other Relevant Orders    Follow Up [facility] - PCP - Established    Mixed hyperlipidemia    Relevant Orders    Follow Up [facility] - PCP - Established    Migraine with aura and without status migrainosus, not intractable - Primary    Relevant Orders    Follow Up [facility] - PCP - Established    Anxiety    Relevant Orders    Follow Up [facility] - PCP - Established     Other Visit Diagnoses         Tinea corporis        Relevant Medications    clotrimazole-betamethasone (Lotrisone) cream    Other Relevant Orders    Follow Up In [facility] - PCP - Established      Acute cystitis with hematuria        Relevant Medications    nitrofurantoin, macrocrystal-monohydrate, (Macrobid) 100 mg capsule    Other Relevant Orders    Follow Up In [facility] - PCP - Established          Adipex     BW next    Randy Mooney MD       This medical note was created with the assistance of artificial intelligence (AI) for documentation purposes. The content has been reviewed and confirmed by the healthcare provider for accuracy and completeness. Patient consented to the use of audio recording and use of AI during their visit.

## 2025-07-31 ENCOUNTER — APPOINTMENT (OUTPATIENT)
Dept: ORTHOPEDIC SURGERY | Facility: CLINIC | Age: 25
End: 2025-07-31
Payer: COMMERCIAL

## 2025-08-07 ENCOUNTER — HOSPITAL ENCOUNTER (EMERGENCY)
Age: 25
Discharge: HOME | End: 2025-08-07
Attending: STUDENT IN AN ORGANIZED HEALTH CARE EDUCATION/TRAINING PROGRAM
Payer: COMMERCIAL

## 2025-08-07 ENCOUNTER — APPOINTMENT (OUTPATIENT)
Dept: CT IMAGING | Age: 25
End: 2025-08-07
Payer: COMMERCIAL

## 2025-08-07 VITALS
HEART RATE: 66 BPM | BODY MASS INDEX: 36.22 KG/M2 | RESPIRATION RATE: 16 BRPM | SYSTOLIC BLOOD PRESSURE: 138 MMHG | HEIGHT: 68 IN | OXYGEN SATURATION: 100 % | DIASTOLIC BLOOD PRESSURE: 89 MMHG | WEIGHT: 239 LBS | TEMPERATURE: 98.4 F

## 2025-08-07 DIAGNOSIS — N20.1 CALCULUS OF URETEROVESICAL JUNCTION (UVJ): Primary | ICD-10-CM

## 2025-08-07 DIAGNOSIS — R31.9 URINARY TRACT INFECTION WITH HEMATURIA, SITE UNSPECIFIED: ICD-10-CM

## 2025-08-07 DIAGNOSIS — N39.0 URINARY TRACT INFECTION WITH HEMATURIA, SITE UNSPECIFIED: ICD-10-CM

## 2025-08-07 LAB
ALBUMIN SERPL-MCNC: 4.7 G/DL (ref 3.5–4.6)
ALP SERPL-CCNC: 93 U/L (ref 40–130)
ALT SERPL-CCNC: 24 U/L (ref 0–33)
ANION GAP SERPL CALCULATED.3IONS-SCNC: 14 MEQ/L (ref 9–15)
AST SERPL-CCNC: 25 U/L (ref 0–35)
BACTERIA URNS QL MICRO: ABNORMAL /HPF
BASOPHILS # BLD: 0.1 K/UL (ref 0–0.2)
BASOPHILS NFR BLD: 0.4 %
BILIRUB SERPL-MCNC: 0.3 MG/DL (ref 0.2–0.7)
BILIRUB UR QL STRIP: ABNORMAL
BUN SERPL-MCNC: 13 MG/DL (ref 6–20)
CALCIUM SERPL-MCNC: 9.6 MG/DL (ref 8.5–9.9)
CHLORIDE SERPL-SCNC: 102 MEQ/L (ref 95–107)
CLARITY UR: ABNORMAL
CO2 SERPL-SCNC: 19 MEQ/L (ref 20–31)
COLOR UR: ABNORMAL
CREAT SERPL-MCNC: 0.64 MG/DL (ref 0.5–0.9)
EOSINOPHIL # BLD: 0.2 K/UL (ref 0–0.7)
EOSINOPHIL NFR BLD: 1.8 %
EPI CELLS #/AREA URNS AUTO: ABNORMAL /HPF (ref 0–5)
ERYTHROCYTE [DISTWIDTH] IN BLOOD BY AUTOMATED COUNT: 14.8 % (ref 11.5–14.5)
GLOBULIN SER CALC-MCNC: 3.4 G/DL (ref 2.3–3.5)
GLUCOSE SERPL-MCNC: 112 MG/DL (ref 70–99)
GLUCOSE UR STRIP-MCNC: NEGATIVE MG/DL
HCG, URINE, POC: NEGATIVE
HCT VFR BLD AUTO: 42.9 % (ref 37–47)
HGB BLD-MCNC: 14.3 G/DL (ref 12–16)
HGB UR QL STRIP: ABNORMAL
HYALINE CASTS #/AREA URNS LPF: ABNORMAL /LPF (ref 0–5)
KETONES UR STRIP-MCNC: NEGATIVE MG/DL
LACTATE BLDV-SCNC: 1.8 MMOL/L (ref 0.5–2.2)
LEUKOCYTE ESTERASE UR QL STRIP: ABNORMAL
LYMPHOCYTES # BLD: 3.3 K/UL (ref 1–4.8)
LYMPHOCYTES NFR BLD: 27.7 %
Lab: NORMAL
MCH RBC QN AUTO: 26.4 PG (ref 27–31.3)
MCHC RBC AUTO-ENTMCNC: 33.3 % (ref 33–37)
MCV RBC AUTO: 79.2 FL (ref 79.4–94.8)
MONOCYTES # BLD: 1.1 K/UL (ref 0.2–0.8)
MONOCYTES NFR BLD: 8.9 %
NEGATIVE QC PASS/FAIL: NORMAL
NEUTROPHILS # BLD: 7.2 K/UL (ref 1.4–6.5)
NEUTS SEG NFR BLD: 60.9 %
NITRITE UR QL STRIP: POSITIVE
PH UR STRIP: 5 [PH] (ref 5–9)
PLATELET # BLD AUTO: 458 K/UL (ref 130–400)
POSITIVE QC PASS/FAIL: NORMAL
POTASSIUM SERPL-SCNC: 4.3 MEQ/L (ref 3.4–4.9)
PROT SERPL-MCNC: 8.1 G/DL (ref 6.3–8)
PROT UR STRIP-MCNC: 100 MG/DL
RBC # BLD AUTO: 5.42 M/UL (ref 4.2–5.4)
RBC #/AREA URNS AUTO: >100 /HPF (ref 0–5)
SODIUM SERPL-SCNC: 135 MEQ/L (ref 135–144)
SP GR UR STRIP: 1.04 (ref 1–1.03)
URINE REFLEX TO CULTURE: YES
UROBILINOGEN UR STRIP-ACNC: 0.2 E.U./DL
WBC # BLD AUTO: 11.8 K/UL (ref 4.8–10.8)
WBC #/AREA URNS AUTO: ABNORMAL /HPF (ref 0–5)

## 2025-08-07 PROCEDURE — 99284 EMERGENCY DEPT VISIT MOD MDM: CPT

## 2025-08-07 PROCEDURE — 83605 ASSAY OF LACTIC ACID: CPT

## 2025-08-07 PROCEDURE — 87086 URINE CULTURE/COLONY COUNT: CPT

## 2025-08-07 PROCEDURE — 6360000002 HC RX W HCPCS: Performed by: STUDENT IN AN ORGANIZED HEALTH CARE EDUCATION/TRAINING PROGRAM

## 2025-08-07 PROCEDURE — 80053 COMPREHEN METABOLIC PANEL: CPT

## 2025-08-07 PROCEDURE — 96374 THER/PROPH/DIAG INJ IV PUSH: CPT

## 2025-08-07 PROCEDURE — 85025 COMPLETE CBC W/AUTO DIFF WBC: CPT

## 2025-08-07 PROCEDURE — 74176 CT ABD & PELVIS W/O CONTRAST: CPT

## 2025-08-07 PROCEDURE — 6370000000 HC RX 637 (ALT 250 FOR IP): Performed by: STUDENT IN AN ORGANIZED HEALTH CARE EDUCATION/TRAINING PROGRAM

## 2025-08-07 PROCEDURE — 86403 PARTICLE AGGLUT ANTBDY SCRN: CPT

## 2025-08-07 PROCEDURE — 81001 URINALYSIS AUTO W/SCOPE: CPT

## 2025-08-07 RX ORDER — CEPHALEXIN 500 MG/1
500 CAPSULE ORAL ONCE
Status: COMPLETED | OUTPATIENT
Start: 2025-08-07 | End: 2025-08-07

## 2025-08-07 RX ORDER — ONDANSETRON 4 MG/1
4 TABLET, FILM COATED ORAL EVERY 8 HOURS PRN
Qty: 20 TABLET | Refills: 0 | Status: SHIPPED | OUTPATIENT
Start: 2025-08-07

## 2025-08-07 RX ORDER — TAMSULOSIN HYDROCHLORIDE 0.4 MG/1
0.4 CAPSULE ORAL DAILY
Qty: 30 CAPSULE | Refills: 3 | Status: SHIPPED | OUTPATIENT
Start: 2025-08-07 | End: 2025-08-07

## 2025-08-07 RX ORDER — KETOROLAC TROMETHAMINE 30 MG/ML
30 INJECTION, SOLUTION INTRAMUSCULAR; INTRAVENOUS ONCE
Status: COMPLETED | OUTPATIENT
Start: 2025-08-07 | End: 2025-08-07

## 2025-08-07 RX ORDER — NAPROXEN 500 MG/1
500 TABLET ORAL 2 TIMES DAILY PRN
Qty: 30 TABLET | Refills: 0 | Status: SHIPPED | OUTPATIENT
Start: 2025-08-07

## 2025-08-07 RX ORDER — CEPHALEXIN 500 MG/1
500 CAPSULE ORAL 4 TIMES DAILY
Qty: 20 CAPSULE | Refills: 0 | Status: SHIPPED | OUTPATIENT
Start: 2025-08-07 | End: 2025-08-12

## 2025-08-07 RX ORDER — NAPROXEN 500 MG/1
500 TABLET ORAL 2 TIMES DAILY PRN
Qty: 30 TABLET | Refills: 0 | Status: SHIPPED | OUTPATIENT
Start: 2025-08-07 | End: 2025-08-07

## 2025-08-07 RX ORDER — ONDANSETRON 4 MG/1
4 TABLET, FILM COATED ORAL EVERY 8 HOURS PRN
Qty: 20 TABLET | Refills: 0 | Status: SHIPPED | OUTPATIENT
Start: 2025-08-07 | End: 2025-08-07

## 2025-08-07 RX ORDER — TAMSULOSIN HYDROCHLORIDE 0.4 MG/1
0.4 CAPSULE ORAL DAILY
Qty: 30 CAPSULE | Refills: 3 | Status: SHIPPED | OUTPATIENT
Start: 2025-08-07

## 2025-08-07 RX ADMIN — KETOROLAC TROMETHAMINE 30 MG: 30 INJECTION, SOLUTION INTRAMUSCULAR at 13:18

## 2025-08-07 RX ADMIN — CEPHALEXIN 500 MG: 500 CAPSULE ORAL at 15:31

## 2025-08-07 ASSESSMENT — ENCOUNTER SYMPTOMS
COUGH: 0
PHOTOPHOBIA: 0
COLOR CHANGE: 0
EYE DISCHARGE: 0
VOMITING: 0
ABDOMINAL PAIN: 1
EYE REDNESS: 0
DIARRHEA: 0
EYE PAIN: 0
WHEEZING: 0
SHORTNESS OF BREATH: 0
CHEST TIGHTNESS: 0
NAUSEA: 0
RHINORRHEA: 0
EYE ITCHING: 0
ABDOMINAL DISTENTION: 0

## 2025-08-07 ASSESSMENT — PAIN DESCRIPTION - ONSET: ONSET: ON-GOING

## 2025-08-07 ASSESSMENT — PAIN DESCRIPTION - LOCATION: LOCATION: ABDOMEN;FLANK

## 2025-08-07 ASSESSMENT — PAIN DESCRIPTION - FREQUENCY: FREQUENCY: CONTINUOUS

## 2025-08-07 ASSESSMENT — PAIN - FUNCTIONAL ASSESSMENT
PAIN_FUNCTIONAL_ASSESSMENT: 0-10
PAIN_FUNCTIONAL_ASSESSMENT: 0-10

## 2025-08-07 ASSESSMENT — PAIN SCALES - GENERAL
PAINLEVEL_OUTOF10: 3
PAINLEVEL_OUTOF10: 10

## 2025-08-07 ASSESSMENT — LIFESTYLE VARIABLES
HOW MANY STANDARD DRINKS CONTAINING ALCOHOL DO YOU HAVE ON A TYPICAL DAY: PATIENT DOES NOT DRINK
HOW OFTEN DO YOU HAVE A DRINK CONTAINING ALCOHOL: NEVER

## 2025-08-07 ASSESSMENT — PAIN DESCRIPTION - PAIN TYPE
TYPE: ACUTE PAIN
TYPE: ACUTE PAIN

## 2025-08-08 LAB
BACTERIA UR CULT: ABNORMAL
BACTERIA UR CULT: ABNORMAL
ORGANISM: ABNORMAL

## 2025-08-21 ENCOUNTER — APPOINTMENT (OUTPATIENT)
Dept: NUTRITION | Facility: CLINIC | Age: 25
End: 2025-08-21
Payer: COMMERCIAL

## 2025-08-22 ENCOUNTER — APPOINTMENT (OUTPATIENT)
Dept: PRIMARY CARE | Facility: CLINIC | Age: 25
End: 2025-08-22
Payer: COMMERCIAL

## 2025-11-17 ENCOUNTER — APPOINTMENT (OUTPATIENT)
Dept: DERMATOLOGY | Facility: HOSPITAL | Age: 25
End: 2025-11-17
Payer: COMMERCIAL

## (undated) DEVICE — SUTURE VCRL SZ 3-0 L36IN ABSRB VLT CT L40MM 1/2 CIR J356H

## (undated) DEVICE — SUTURE VCRL SZ 0 L36IN ABSRB VLT L36MM CT-1 1/2 CIR J346H

## (undated) DEVICE — POSITIONING SYSTEM, PAGAZZI, PATIENT

## (undated) DEVICE — ADHESIVE, SKIN, DERMABOND ADVANCED, 15CM, PEN-STYLE

## (undated) DEVICE — TOWEL PACK, STERILE, 16X24, XRAY DETECTABLE, BLUE, 4/PK

## (undated) DEVICE — ASTOUND STANDARD SURGICAL GOWN, XL: Brand: CONVERTORS

## (undated) DEVICE — GOWN, SURGICAL, ROYAL SILK, XL, STERILE

## (undated) DEVICE — SYRINGE BULB EAR ULCER LF ST 1 OZ MEDICHOICE

## (undated) DEVICE — MANIFOLD, 4 PORT NEPTUNE STANDARD

## (undated) DEVICE — Device

## (undated) DEVICE — PAD, SANITARY, OBSTETRICAL, W/ADHSV STRIP,11 IN,LF

## (undated) DEVICE — DEVICE, VOYANT, MARYLAND, 5MM X 37CM

## (undated) DEVICE — SYSTEM, SMOKE EVAC, SEECLEAR XCL, 8.0 LITER, LF

## (undated) DEVICE — TRAY, DRY PREP, PREMIUM

## (undated) DEVICE — STRAP, ARM BOARD, 32 X 1.5

## (undated) DEVICE — SUTURE VCRL SZ 4-0 L27IN ABSRB UD L60MM KS STR REV CUT NDL J662H

## (undated) DEVICE — STERILE NEOPRENE POWDER-FREE SURGICAL GLOVES WITH NITRILE COATING: Brand: PROTEXIS

## (undated) DEVICE — HOLSTER, JET SAFETY

## (undated) DEVICE — SUTURE, VICRYL, 0, 27 IN, UR-6, VIOLET

## (undated) DEVICE — PUMP, STRYKERFLOW 2 & HANDPIECE W/10FT. IRRIGATION TUBING

## (undated) DEVICE — SUTURE VCRL SZ 1 L36IN ABSRB UD L36MM CT-1 1/2 CIR J947H

## (undated) DEVICE — DRAPE, LEGGINGS, 28.5 X 43 IN, DISPOSABLE, LF, STERILE

## (undated) DEVICE — SOLUTION, IRRIGATION, USP, SODIUM CHLORIDE 0.9%, 3000 ML

## (undated) DEVICE — DRAPE PACK, LAVH, W/ATTACHED LEGGINGS, W/POUCH, 100 X 114 IN, LF, STERILE

## (undated) DEVICE — GOWN, SURGICAL, ROYAL SILK, LG, STERILE

## (undated) DEVICE — GOWN, SURGICAL, IMPLT, BACK, XLARGE, XLONG, STERILE

## (undated) DEVICE — NEEDLE, SAFETY, 25 GA X 1.5 IN

## (undated) DEVICE — SUTURE, VICRYL PLUS 3-0, SH, 27IN

## (undated) DEVICE — SOLUTION KIT, ANTIFOG, 6CC, LF

## (undated) DEVICE — SUTURE, MONOCRYL, 4-0, 27 IN, PS-2, UNDYED

## (undated) DEVICE — GLOVE, SURGICAL, PROTEXIS PI , 7.5, PF, LF

## (undated) DEVICE — DRESSING TELFA STRL 3X6

## (undated) DEVICE — APPLICATOR, CHLORAPREP, W/ORANGE TINT, 26ML

## (undated) DEVICE — WARMER, DUAL SCOPE

## (undated) DEVICE — STAPLER SKIN SQ 30 ABSRB STPL DISP INSORB